# Patient Record
Sex: MALE | Race: AMERICAN INDIAN OR ALASKA NATIVE | NOT HISPANIC OR LATINO | URBAN - METROPOLITAN AREA
[De-identification: names, ages, dates, MRNs, and addresses within clinical notes are randomized per-mention and may not be internally consistent; named-entity substitution may affect disease eponyms.]

---

## 2017-07-27 ENCOUNTER — INPATIENT (INPATIENT)
Facility: HOSPITAL | Age: 67
LOS: 0 days | Discharge: ANOTHER HOSPITAL | End: 2017-07-27
Attending: INTERNAL MEDICINE | Admitting: INTERNAL MEDICINE
Payer: SELF-PAY

## 2017-07-27 VITALS
DIASTOLIC BLOOD PRESSURE: 73 MMHG | OXYGEN SATURATION: 98 % | SYSTOLIC BLOOD PRESSURE: 126 MMHG | HEART RATE: 95 BPM | TEMPERATURE: 98 F | RESPIRATION RATE: 24 BRPM

## 2017-07-27 VITALS
HEART RATE: 89 BPM | SYSTOLIC BLOOD PRESSURE: 119 MMHG | RESPIRATION RATE: 26 BRPM | DIASTOLIC BLOOD PRESSURE: 68 MMHG | OXYGEN SATURATION: 99 %

## 2017-07-27 DIAGNOSIS — R07.9 CHEST PAIN, UNSPECIFIED: ICD-10-CM

## 2017-07-27 LAB
ALBUMIN SERPL ELPH-MCNC: 2.8 G/DL — LOW (ref 3.3–5)
ALP SERPL-CCNC: 73 U/L — SIGNIFICANT CHANGE UP (ref 40–120)
ALT FLD-CCNC: 20 U/L — SIGNIFICANT CHANGE UP (ref 4–41)
AST SERPL-CCNC: 20 U/L — SIGNIFICANT CHANGE UP (ref 4–40)
BILIRUB SERPL-MCNC: 0.8 MG/DL — SIGNIFICANT CHANGE UP (ref 0.2–1.2)
BUN SERPL-MCNC: 13 MG/DL — SIGNIFICANT CHANGE UP (ref 7–23)
CALCIUM SERPL-MCNC: 7.8 MG/DL — LOW (ref 8.4–10.5)
CHLORIDE SERPL-SCNC: 91 MMOL/L — LOW (ref 98–107)
CK MB BLD-MCNC: 1.31 NG/ML — SIGNIFICANT CHANGE UP (ref 1–6.6)
CK MB BLD-MCNC: SIGNIFICANT CHANGE UP (ref 0–2.5)
CK SERPL-CCNC: 99 U/L — SIGNIFICANT CHANGE UP (ref 30–200)
CO2 SERPL-SCNC: 31 MMOL/L — SIGNIFICANT CHANGE UP (ref 22–31)
CREAT SERPL-MCNC: 0.84 MG/DL — SIGNIFICANT CHANGE UP (ref 0.5–1.3)
GLUCOSE SERPL-MCNC: 247 MG/DL — HIGH (ref 70–99)
HBA1C BLD-MCNC: 10.2 % — HIGH (ref 4–5.6)
HCT VFR BLD CALC: 31.8 % — LOW (ref 39–50)
HGB BLD-MCNC: 9.2 G/DL — LOW (ref 13–17)
MAGNESIUM SERPL-MCNC: 1.8 MG/DL — SIGNIFICANT CHANGE UP (ref 1.6–2.6)
MCHC RBC-ENTMCNC: 23 PG — LOW (ref 27–34)
MCHC RBC-ENTMCNC: 28.9 % — LOW (ref 32–36)
MCV RBC AUTO: 79.5 FL — LOW (ref 80–100)
NRBC # FLD: 0 — SIGNIFICANT CHANGE UP
NT-PROBNP SERPL-SCNC: 14.13 PG/ML — SIGNIFICANT CHANGE UP
PHOSPHATE SERPL-MCNC: 3.9 MG/DL — SIGNIFICANT CHANGE UP (ref 2.5–4.5)
PLATELET # BLD AUTO: 246 K/UL — SIGNIFICANT CHANGE UP (ref 150–400)
PMV BLD: 8.8 FL — SIGNIFICANT CHANGE UP (ref 7–13)
POTASSIUM SERPL-MCNC: 3 MMOL/L — LOW (ref 3.5–5.3)
POTASSIUM SERPL-SCNC: 3 MMOL/L — LOW (ref 3.5–5.3)
PROT SERPL-MCNC: 6.4 G/DL — SIGNIFICANT CHANGE UP (ref 6–8.3)
RBC # BLD: 4 M/UL — LOW (ref 4.2–5.8)
RBC # FLD: 18.3 % — HIGH (ref 10.3–14.5)
SODIUM SERPL-SCNC: 133 MMOL/L — LOW (ref 135–145)
TROPONIN T SERPL-MCNC: < 0.06 NG/ML — SIGNIFICANT CHANGE UP (ref 0–0.06)
TSH SERPL-MCNC: 0.01 UIU/ML — LOW (ref 0.27–4.2)
WBC # BLD: 7.38 K/UL — SIGNIFICANT CHANGE UP (ref 3.8–10.5)
WBC # FLD AUTO: 7.38 K/UL — SIGNIFICANT CHANGE UP (ref 3.8–10.5)

## 2017-07-27 PROCEDURE — 93010 ELECTROCARDIOGRAM REPORT: CPT

## 2017-07-27 RX ORDER — POTASSIUM CHLORIDE 20 MEQ
40 PACKET (EA) ORAL EVERY 4 HOURS
Qty: 0 | Refills: 0 | Status: DISCONTINUED | OUTPATIENT
Start: 2017-07-27 | End: 2017-07-27

## 2017-07-27 RX ORDER — SODIUM CHLORIDE 9 MG/ML
3 INJECTION INTRAMUSCULAR; INTRAVENOUS; SUBCUTANEOUS EVERY 8 HOURS
Qty: 0 | Refills: 0 | Status: DISCONTINUED | OUTPATIENT
Start: 2017-07-27 | End: 2017-07-27

## 2017-07-27 RX ORDER — SODIUM CHLORIDE 9 MG/ML
3 INJECTION INTRAMUSCULAR; INTRAVENOUS; SUBCUTANEOUS
Qty: 0 | Refills: 0 | COMMUNITY
Start: 2017-07-27

## 2017-07-27 RX ORDER — ASPIRIN/CALCIUM CARB/MAGNESIUM 324 MG
1 TABLET ORAL
Qty: 0 | Refills: 0 | COMMUNITY

## 2017-07-27 RX ORDER — POTASSIUM CHLORIDE 20 MEQ
2 PACKET (EA) ORAL
Qty: 0 | Refills: 0 | COMMUNITY
Start: 2017-07-27

## 2017-07-27 RX ORDER — ATORVASTATIN CALCIUM 80 MG/1
1 TABLET, FILM COATED ORAL
Qty: 0 | Refills: 0 | COMMUNITY

## 2017-07-27 RX ADMIN — SODIUM CHLORIDE 3 MILLILITER(S): 9 INJECTION INTRAMUSCULAR; INTRAVENOUS; SUBCUTANEOUS at 21:49

## 2017-07-27 RX ADMIN — Medication 40 MILLIEQUIVALENT(S): at 21:47

## 2017-07-27 NOTE — CHART NOTE - NSCHARTNOTEFT_GEN_A_CORE
Discussed w/ Dr. Muro regarding patient transfer to [address] [place] [address]. As per Dr. Muro, ok to transfer to [facility].

## 2017-07-27 NOTE — DISCHARGE NOTE ADULT - HOSPITAL COURSE
66 y/o M w/ PMH of DM, HTN, CAD, Anemia, MI (?2007) and CHF being transferred from [facility] for cardiac catheretization. Pt was admitted to [place] for an acute CHF exacerbation and was diuresed with IV Lasix BID. During hospitalization in [facility], he had a stress test on 7/24/17 which showed a severe fixed defect involving the mid to apical gayatri-septal wall and EF of 34%. His admission was complicated by Vfib cardiac arrest and ROSC was achieved after 12 minutes after 3 doses of epinephrine and defibrillation. Patient was then transferred to CCU at that time after ROSC and intubation. Pt was extubated in CCU and echo showed EF 35-40%, severe hypokinesis of anterior/septal/apical walls with severe diastolic dysfunction. Patient was transfused 1 unit PRBC for anemia and was started on iron supplementation. Pt transferred to [facility] cath lab for angiogram 7/27/17. Patient now transferred to [facility] CCU for close monitoring. s/p LHC showing severe multi vessel CAD; pLAd 95% stenosis, mLAD 99% stenosis, OM1 99% stenosis, % stenosis. EF 35%. Patient transferred to [facility] for CTS service for CABG evaluation.

## 2017-07-27 NOTE — DISCHARGE NOTE ADULT - CARE PROVIDER_API CALL
Maurice Muro), Cardiovascular Disease; Internal Medicine  [address] [place] [address]  Phone: [phone]  Fax: [phone]

## 2017-07-27 NOTE — DISCHARGE NOTE ADULT - CARE PLAN
Principal Discharge DX:	Non-ST elevation (NSTEMI) myocardial infarction  Secondary Diagnosis:	Acute systolic congestive heart failure  Secondary Diagnosis:	Type 2 diabetes mellitus without complication, with long-term current use of insulin  Goal:	Maintain adequate glycemic control. Monitor blood sugars. Goal HgA1C < 7.%. Maintains compliance with medication and diet  Secondary Diagnosis:	Other hyperlipidemia  Goal:	Maintain adequate control of your cholesterol levels. Goal LDL < 70. Maintains compliance with medication and diet  Secondary Diagnosis:	Essential hypertension  Goal:	Maintain adequate control of your blood pressure. Goal BP < 130/80. Maintains compliance with medication and diet.  Secondary Diagnosis:	Coronary artery disease involving native coronary artery of native heart without angina pectoris  Secondary Diagnosis:	Anemia, unspecified type Principal Discharge DX:	Non-ST elevation (NSTEMI) myocardial infarction  Goal:	To revascularize occluded coronary artery and maintain patency  Instructions for follow-up, activity and diet:	You got admitted to hospital for angiogram and was showed to have blockages in coronary artery. You are being transferred to [facility] for possible surgery to correct blockages in heart.  Secondary Diagnosis:	Acute systolic congestive heart failure  Goal:	Maintain medication compliance, prevent hospital readmission  Instructions for follow-up, activity and diet:	You are found to have decrease in the contractility function of heart. You may need to take medications to prevent symptoms and to maintain fluid balance of body.  Secondary Diagnosis:	Type 2 diabetes mellitus without complication, with long-term current use of insulin  Goal:	Maintain adequate glycemic control. Monitor blood sugars. Goal HgA1C < 7.%. Maintains compliance with medication and diet  Instructions for follow-up, activity and diet:	Diabetes mellitus type 2 is a disease that affects how your body uses glucose (sugar). Normally, when the blood sugar level increases, the pancreas makes more insulin. Insulin helps move sugar out of the blood so it can be used for energy. Type 2 diabetes develops because either the body cannot make enough insulin, or it cannot use the insulin correctly. Your HbA1C was elevated when you came to the hospital, which shows that your diabetes has not been controlled at home. Uncontrolled diabetes can cause eye disease, heart attack, and stroke. You should follow up with PCP and/or endocrinologist for ongoing medical management of your diabetes. Please check your blood sugars as ordered and continue your medications and insulin as prescribed. Low carb diet. If you had an angiogram with contrast, do not start oral medications until 72 hours post procedure.  Secondary Diagnosis:	Other hyperlipidemia  Goal:	Maintain adequate control of your cholesterol levels. Goal LDL < 70. Maintains compliance with medication and diet  Secondary Diagnosis:	Essential hypertension  Goal:	Maintain adequate control of your blood pressure. Goal BP < 130/80. Maintains compliance with medication and diet.  Instructions for follow-up, activity and diet:	hypertension is a long-term condition in which your blood pressure (BP) is higher than normal. Your BP is the force of your blood moving against the walls of your arteries. Normal blood pressure is less than 120/80. Prehypertension is BP between 120/80 and 139/89. High blood pressure is 140/90 or higher.Uncontrolled hypertension can lead to retinal disease, heart attack, and stroke. Follow up with PCP and/or cardiologist for ongoing medical management of your hypertension.Please continue to monitor your blood pressure at home and continue medications as prescribed. DASH diet.  Secondary Diagnosis:	Coronary artery disease involving native coronary artery of native heart without angina pectoris  Goal:	Achieve revascularization of occluded coronary artery, medication compliance  Instructions for follow-up, activity and diet:	You got admitted to hospital for angiogram and was showed to have blockages in coronary artery. You are being transferred to [facility] for possible surgery to correct blockages in heart.  Secondary Diagnosis:	Anemia, unspecified type  Goal:	Maintain H/H level with in normal limits.  Instructions for follow-up, activity and diet:	You have history of anemia and is on iron tablets to maintain blood levels. Principal Discharge DX:	Non-ST elevation (NSTEMI) myocardial infarction  Goal:	To revascularize occluded coronary artery and maintain patency  Instructions for follow-up, activity and diet:	You got admitted to hospital for angiogram and was showed to have blockages in coronary artery. You are being transferred to [facility] for possible surgery to correct blockages in heart.  Secondary Diagnosis:	Acute systolic congestive heart failure  Goal:	Maintain medication compliance, prevent hospital readmission  Instructions for follow-up, activity and diet:	You are found to have decrease in the contractility function of heart. You may need to take medications to prevent symptoms and to maintain fluid balance of body.  Secondary Diagnosis:	Type 2 diabetes mellitus without complication, with long-term current use of insulin  Goal:	Maintain adequate glycemic control. Monitor blood sugars. Goal HgA1C < 7.%. Maintains compliance with medication and diet  Instructions for follow-up, activity and diet:	Diabetes mellitus type 2 is a disease that affects how your body uses glucose (sugar). Normally, when the blood sugar level increases, the pancreas makes more insulin. Insulin helps move sugar out of the blood so it can be used for energy. Type 2 diabetes develops because either the body cannot make enough insulin, or it cannot use the insulin correctly. Your HbA1C was elevated when you came to the hospital, which shows that your diabetes has not been controlled at home. Uncontrolled diabetes can cause eye disease, heart attack, and stroke. You should follow up with PCP and/or endocrinologist for ongoing medical management of your diabetes. Please check your blood sugars as ordered and continue your medications and insulin as prescribed. Low carb diet. If you had an angiogram with contrast, do not start oral medications until 72 hours post procedure.  Secondary Diagnosis:	Other hyperlipidemia  Goal:	Maintain adequate control of your cholesterol levels. Goal LDL < 70. Maintains compliance with medication and diet  Secondary Diagnosis:	Essential hypertension  Goal:	Maintain adequate control of your blood pressure. Goal BP < 130/80. Maintains compliance with medication and diet.  Instructions for follow-up, activity and diet:	hypertension is a long-term condition in which your blood pressure (BP) is higher than normal. Your BP is the force of your blood moving against the walls of your arteries. Normal blood pressure is less than 120/80. Prehypertension is BP between 120/80 and 139/89. High blood pressure is 140/90 or higher.Uncontrolled hypertension can lead to retinal disease, heart attack, and stroke. Follow up with PCP and/or cardiologist for ongoing medical management of your hypertension.Please continue to monitor your blood pressure at home and continue medications as prescribed. DASH diet.  Secondary Diagnosis:	Coronary artery disease involving native coronary artery of native heart without angina pectoris  Goal:	Achieve revascularization of occluded coronary artery, medication compliance  Instructions for follow-up, activity and diet:	You got admitted to hospital for angiogram and was showed to have blockages in coronary artery. You are being transferred to [facility] for possible surgery to correct blockages in heart.  Secondary Diagnosis:	Anemia, unspecified type  Goal:	Maintain H/H level with in normal limits.  Instructions for follow-up, activity and diet:	You have history of anemia and is on iron tablets to maintain blood levels. Principal Discharge DX:	Non-ST elevation (NSTEMI) myocardial infarction  Goal:	To revascularize occluded coronary artery and maintain patency  Instructions for follow-up, activity and diet:	You got admitted to hospital for angiogram and was showed to have blockages in coronary artery. You are being transferred to [facility] for possible surgery to correct blockages in heart.  Secondary Diagnosis:	Acute systolic congestive heart failure  Goal:	Maintain medication compliance, prevent hospital readmission  Instructions for follow-up, activity and diet:	You are found to have decrease in the contractility function of heart. You may need to take medications to prevent symptoms and to maintain fluid balance of body.  Secondary Diagnosis:	Type 2 diabetes mellitus without complication, with long-term current use of insulin  Goal:	Maintain adequate glycemic control. Monitor blood sugars. Goal HgA1C < 7.%. Maintains compliance with medication and diet  Instructions for follow-up, activity and diet:	Diabetes mellitus type 2 is a disease that affects how your body uses glucose (sugar). Normally, when the blood sugar level increases, the pancreas makes more insulin. Insulin helps move sugar out of the blood so it can be used for energy. Type 2 diabetes develops because either the body cannot make enough insulin, or it cannot use the insulin correctly. Your HbA1C was elevated when you came to the hospital, which shows that your diabetes has not been controlled at home. Uncontrolled diabetes can cause eye disease, heart attack, and stroke. You should follow up with PCP and/or endocrinologist for ongoing medical management of your diabetes. Please check your blood sugars as ordered and continue your medications and insulin as prescribed. Low carb diet. If you had an angiogram with contrast, do not start oral medications until 72 hours post procedure.  Secondary Diagnosis:	Other hyperlipidemia  Goal:	Maintain adequate control of your cholesterol levels. Goal LDL < 70. Maintains compliance with medication and diet  Secondary Diagnosis:	Essential hypertension  Goal:	Maintain adequate control of your blood pressure. Goal BP < 130/80. Maintains compliance with medication and diet.  Instructions for follow-up, activity and diet:	hypertension is a long-term condition in which your blood pressure (BP) is higher than normal. Your BP is the force of your blood moving against the walls of your arteries. Normal blood pressure is less than 120/80. Prehypertension is BP between 120/80 and 139/89. High blood pressure is 140/90 or higher.Uncontrolled hypertension can lead to retinal disease, heart attack, and stroke. Follow up with PCP and/or cardiologist for ongoing medical management of your hypertension.Please continue to monitor your blood pressure at home and continue medications as prescribed. DASH diet.  Secondary Diagnosis:	Coronary artery disease involving native coronary artery of native heart without angina pectoris  Goal:	Achieve revascularization of occluded coronary artery, medication compliance  Instructions for follow-up, activity and diet:	You got admitted to hospital for angiogram and was showed to have blockages in coronary artery. You are being transferred to [facility] for possible surgery to correct blockages in heart.  Secondary Diagnosis:	Anemia, unspecified type  Goal:	Maintain H/H level with in normal limits.  Instructions for follow-up, activity and diet:	You have history of anemia and is on iron tablets to maintain blood levels.

## 2017-07-27 NOTE — H&P CARDIOLOGY - RS GEN PE MLT RESP DETAILS PC
good air movement/respirations non-labored/no chest wall tenderness/airway patent/breath sounds equal/clear to auscultation bilaterally

## 2017-07-27 NOTE — PATIENT PROFILE ADULT. - LANGUAGE ASSISTANCE NEEDED
patient's daughter at bedside/No-Patient/Caregiver offered and refused free interpretation services.

## 2017-07-27 NOTE — H&P CARDIOLOGY - PMH
Anemia    CAD (coronary artery disease)    CHF (congestive heart failure)    DM (diabetes mellitus)    HLD (hyperlipidemia)    HTN (hypertension)    MI (myocardial infarction)

## 2017-07-27 NOTE — H&P CARDIOLOGY - HISTORY OF PRESENT ILLNESS
68 y/o M w/ PMH of DM, HTN, CAD, Anemia, MI (?2007) and CHF being transferred from [facility] for cardiac catheretization. Pt was admitted to [place] for an acute CHF exacerbation and was diuresed with IV Lasix BID. Pt had a stress test on 724/17 which showed a severe fixed defect involving the mid to apical gayatri-septal wall and EF of 34%. Pt admission was complicated by Vfib cardiac arrest and ROSC was achieved after 12 minutes after 3 doses of epinephrine and defibrillation. Pt was transferred to CCU at that time after ROSC and intubation. Pt was extubated in CCU and echo showed EF 35-40%, severe hypokinesis of anterior/septal/apical walls with severe diastolic dysfunction. During admission patient was transfused 1 unit PRBC for anemia and was started on iron supplementation. Pt transferred to [facility] cath lab for angiogram with plan for further CCU care post cath. 68 y/o M w/ PMH of DM, HTN, CAD, Anemia, MI (?2007) and CHF being transferred from [facility] for cardiac catheretization. Pt was admitted to [place] for an acute CHF exacerbation and was diuresed with IV Lasix BID. Pt had a stress test on 7/24/17 which showed a severe fixed defect involving the mid to apical gayatri-septal wall and EF of 34%. Pt admission was complicated by Vfib cardiac arrest and ROSC was achieved after 12 minutes after 3 doses of epinephrine and defibrillation. Pt was transferred to CCU at that time after ROSC and intubation. Pt was extubated in CCU and echo showed EF 35-40%, severe hypokinesis of anterior/septal/apical walls with severe diastolic dysfunction. During admission patient was transfused 1 unit PRBC for anemia and was started on iron supplementation. Pt transferred to [facility] cath lab for angiogram with plan for further CCU care post cath.

## 2017-07-27 NOTE — PROGRESS NOTE ADULT - SUBJECTIVE AND OBJECTIVE BOX
Right 5 Fr arterial sheath discontinued. Maunal pressure applied for 18 mins and good hemostatis obtained.  No hematoma or bleeding noted.Vital sign stable .Patient tolerated procedure well. RN instructed to monitor groin for bleeding or hematoma  .

## 2017-07-27 NOTE — DISCHARGE NOTE ADULT - MEDICATION SUMMARY - MEDICATIONS TO TAKE
I will START or STAY ON the medications listed below when I get home from the hospital:    sodium chloride 0.9% lock flush  -- 3 milliliter(s) intravenous every 8 hours  -- Indication: For KVO    aspirin 81 mg oral tablet  -- 1 tab(s) by mouth once a day  -- Indication: For CAD (coronary artery disease)    valsartan 40 mg oral tablet  -- 1 tab(s) by mouth once a day  -- Indication: For CHF (congestive heart failure)    Lantus 100 units/mL subcutaneous solution  -- 17 unit(s) subcutaneous once a day (at bedtime)  -- Indication: For DM (diabetes mellitus)    Lipitor 80 mg oral tablet  -- 1 tab(s) by mouth once a day  -- Indication: For HLD (hyperlipidemia)    Plavix 75 mg oral tablet  -- 1 tab(s) by mouth once a day  -- Indication: For MI (myocardial infarction)    metoprolol tartrate 25 mg oral tablet  -- 1 tab(s) by mouth 2 times a day  -- Indication: For CAD (coronary artery disease)    Lasix 40 mg oral tablet  -- 1 tab(s) by mouth once a day  -- Indication: For CHF (congestive heart failure)    ferrous sulfate 325 mg (65 mg elemental iron) oral delayed release tablet  -- 1 tab(s) by mouth 2 times a day  -- Indication: For Anemia    potassium chloride 20 mEq oral tablet, extended release  -- 2 tab(s) by mouth every 4 hours  -- Indication: For CHF (congestive heart failure)

## 2017-07-27 NOTE — DISCHARGE NOTE ADULT - FINDINGS/TREATMENT
< from: Cardiac Cath Lab - Adult (07.27.17 @ 18:19) > CORONARY VESSELS: The coronary circulation is left dominant. LM:   --  LM: Angiography showed mild atherosclerosis with no flow limiting lesions. LAD:   --  Proximal LAD: There was a 95 % stenosis. Mid LAD: There was a 99 % stenosis. D1: Angiography showed severe atherosclerosis. CX:   --  Circumflex: Angiography showed mild atherosclerosis with no flow limiting lesions. OM1: The vessel was large sized. There was a 99 % stenosis. RCA:   --  Proximal RCA: There was a 100 % stenosis. COMPLICATIONS: There were no complications. DIAGNOSTIC RECOMMENDATIONS: Severe multi-vessel CAD in DM patient. s/p V. Fib Arrest (Defib x 1) Systolic CHF EF 35% CTS for CABG. INTERVENTIONAL RECOMMENDATIONS: Severe multi-vessel CAD in DM patient. s/p V. Fib Arrest (Defib x 1) Systolic CHF EF 35% CTS for CABG.    < end of copied text >

## 2017-07-27 NOTE — DISCHARGE NOTE ADULT - SECONDARY DIAGNOSIS.
Acute systolic congestive heart failure Type 2 diabetes mellitus without complication, with long-term current use of insulin Other hyperlipidemia Essential hypertension Coronary artery disease involving native coronary artery of native heart without angina pectoris Anemia, unspecified type

## 2017-07-27 NOTE — H&P CARDIOLOGY - NEGATIVE NEUROLOGICAL SYMPTOMS
no facial palsy/no tremors/no hemiparesis/no paresthesias/no confusion/no difficulty walking/no focal seizures/no headache/no vertigo/no loss of consciousness/no syncope/no generalized seizures/no transient paralysis/no weakness/no loss of sensation

## 2017-07-27 NOTE — DISCHARGE NOTE ADULT - PLAN OF CARE
Maintain adequate glycemic control. Monitor blood sugars. Goal HgA1C < 7.%. Maintains compliance with medication and diet Maintain adequate control of your cholesterol levels. Goal LDL < 70. Maintains compliance with medication and diet Maintain adequate control of your blood pressure. Goal BP < 130/80. Maintains compliance with medication and diet. To revascularize occluded coronary artery and maintain patency Maintain medication compliance, prevent hospital readmission Achieve revascularization of occluded coronary artery, medication compliance Maintain H/H level with in normal limits. You got admitted to hospital for angiogram and was showed to have blockages in coronary artery. You are being transferred to [facility] for possible surgery to correct blockages in heart. You are found to have decrease in the contractility function of heart. You may need to take medications to prevent symptoms and to maintain fluid balance of body. Diabetes mellitus type 2 is a disease that affects how your body uses glucose (sugar). Normally, when the blood sugar level increases, the pancreas makes more insulin. Insulin helps move sugar out of the blood so it can be used for energy. Type 2 diabetes develops because either the body cannot make enough insulin, or it cannot use the insulin correctly. Your HbA1C was elevated when you came to the hospital, which shows that your diabetes has not been controlled at home. Uncontrolled diabetes can cause eye disease, heart attack, and stroke. You should follow up with PCP and/or endocrinologist for ongoing medical management of your diabetes. Please check your blood sugars as ordered and continue your medications and insulin as prescribed. Low carb diet. If you had an angiogram with contrast, do not start oral medications until 72 hours post procedure. hypertension is a long-term condition in which your blood pressure (BP) is higher than normal. Your BP is the force of your blood moving against the walls of your arteries. Normal blood pressure is less than 120/80. Prehypertension is BP between 120/80 and 139/89. High blood pressure is 140/90 or higher.Uncontrolled hypertension can lead to retinal disease, heart attack, and stroke. Follow up with PCP and/or cardiologist for ongoing medical management of your hypertension.Please continue to monitor your blood pressure at home and continue medications as prescribed. DASH diet. You got admitted to hospital for angiogram and was showed to have blockages in coronary artery. You are being transferred to [facility] for possible surgery to correct blockages in heart. You have history of anemia and is on iron tablets to maintain blood levels.

## 2017-07-28 ENCOUNTER — INPATIENT (INPATIENT)
Facility: HOSPITAL | Age: 67
LOS: 13 days | Discharge: NURSING, REHAB OR LONG-TERM CARE | DRG: 235 | End: 2017-08-11
Attending: THORACIC SURGERY (CARDIOTHORACIC VASCULAR SURGERY) | Admitting: THORACIC SURGERY (CARDIOTHORACIC VASCULAR SURGERY)
Payer: COMMERCIAL

## 2017-07-28 VITALS
TEMPERATURE: 97 F | WEIGHT: 198.64 LBS | HEIGHT: 66 IN | OXYGEN SATURATION: 94 % | RESPIRATION RATE: 18 BRPM | SYSTOLIC BLOOD PRESSURE: 131 MMHG | HEART RATE: 94 BPM | DIASTOLIC BLOOD PRESSURE: 81 MMHG

## 2017-07-28 DIAGNOSIS — I50.22 CHRONIC SYSTOLIC (CONGESTIVE) HEART FAILURE: ICD-10-CM

## 2017-07-28 DIAGNOSIS — Z29.9 ENCOUNTER FOR PROPHYLACTIC MEASURES, UNSPECIFIED: ICD-10-CM

## 2017-07-28 DIAGNOSIS — I10 ESSENTIAL (PRIMARY) HYPERTENSION: ICD-10-CM

## 2017-07-28 DIAGNOSIS — I25.110 ATHEROSCLEROTIC HEART DISEASE OF NATIVE CORONARY ARTERY WITH UNSTABLE ANGINA PECTORIS: ICD-10-CM

## 2017-07-28 DIAGNOSIS — E78.5 HYPERLIPIDEMIA, UNSPECIFIED: ICD-10-CM

## 2017-07-28 DIAGNOSIS — D64.9 ANEMIA, UNSPECIFIED: ICD-10-CM

## 2017-07-28 DIAGNOSIS — E78.2 MIXED HYPERLIPIDEMIA: ICD-10-CM

## 2017-07-28 DIAGNOSIS — E11.65 TYPE 2 DIABETES MELLITUS WITH HYPERGLYCEMIA: ICD-10-CM

## 2017-07-28 DIAGNOSIS — I50.21 ACUTE SYSTOLIC (CONGESTIVE) HEART FAILURE: ICD-10-CM

## 2017-07-28 DIAGNOSIS — I25.10 ATHEROSCLEROTIC HEART DISEASE OF NATIVE CORONARY ARTERY WITHOUT ANGINA PECTORIS: ICD-10-CM

## 2017-07-28 DIAGNOSIS — I25.118 ATHEROSCLEROTIC HEART DISEASE OF NATIVE CORONARY ARTERY WITH OTHER FORMS OF ANGINA PECTORIS: ICD-10-CM

## 2017-07-28 PROBLEM — Z00.00 ENCOUNTER FOR PREVENTIVE HEALTH EXAMINATION: Status: ACTIVE | Noted: 2017-07-28

## 2017-07-28 LAB
ANION GAP SERPL CALC-SCNC: 9 MMOL/L — SIGNIFICANT CHANGE UP (ref 5–17)
APPEARANCE UR: CLEAR — SIGNIFICANT CHANGE UP
APTT BLD: 22.5 SEC — LOW (ref 27.5–37.4)
APTT BLD: 40.2 SEC — HIGH (ref 27.5–37.4)
BILIRUB UR-MCNC: NEGATIVE — SIGNIFICANT CHANGE UP
BLD GP AB SCN SERPL QL: NEGATIVE — SIGNIFICANT CHANGE UP
BUN SERPL-MCNC: 13 MG/DL — SIGNIFICANT CHANGE UP (ref 7–23)
CALCIUM SERPL-MCNC: 8.1 MG/DL — LOW (ref 8.4–10.5)
CHLORIDE SERPL-SCNC: 93 MMOL/L — LOW (ref 96–108)
CO2 SERPL-SCNC: 31 MMOL/L — SIGNIFICANT CHANGE UP (ref 22–31)
COLOR SPEC: YELLOW — SIGNIFICANT CHANGE UP
CREAT SERPL-MCNC: 0.92 MG/DL — SIGNIFICANT CHANGE UP (ref 0.5–1.3)
DIFF PNL FLD: NEGATIVE — SIGNIFICANT CHANGE UP
GLUCOSE SERPL-MCNC: 277 MG/DL — HIGH (ref 70–99)
GLUCOSE UR QL: 1000
HCT VFR BLD CALC: 33.7 % — LOW (ref 39–50)
HGB BLD-MCNC: 10.1 G/DL — LOW (ref 13–17)
INR BLD: 1.08 RATIO — SIGNIFICANT CHANGE UP (ref 0.88–1.16)
KETONES UR-MCNC: NEGATIVE — SIGNIFICANT CHANGE UP
LEUKOCYTE ESTERASE UR-ACNC: NEGATIVE — SIGNIFICANT CHANGE UP
MCHC RBC-ENTMCNC: 24.9 PG — LOW (ref 27–34)
MCHC RBC-ENTMCNC: 30.1 GM/DL — LOW (ref 32–36)
MCV RBC AUTO: 82.8 FL — SIGNIFICANT CHANGE UP (ref 80–100)
MRSA PCR RESULT.: SIGNIFICANT CHANGE UP
NITRITE UR-MCNC: NEGATIVE — SIGNIFICANT CHANGE UP
NT-PROBNP SERPL-SCNC: 2447 PG/ML — HIGH (ref 0–300)
PA ADP PRP-ACNC: 272 PRU — SIGNIFICANT CHANGE UP (ref 194–417)
PH UR: 7.5 — SIGNIFICANT CHANGE UP (ref 5–8)
PLATELET # BLD AUTO: 263 K/UL — SIGNIFICANT CHANGE UP (ref 150–400)
POTASSIUM SERPL-MCNC: 4 MMOL/L — SIGNIFICANT CHANGE UP (ref 3.5–5.3)
POTASSIUM SERPL-SCNC: 4 MMOL/L — SIGNIFICANT CHANGE UP (ref 3.5–5.3)
PROT UR-MCNC: SIGNIFICANT CHANGE UP
PROTHROM AB SERPL-ACNC: 11.7 SEC — SIGNIFICANT CHANGE UP (ref 9.8–12.7)
RBC # BLD: 4.07 M/UL — LOW (ref 4.2–5.8)
RBC # FLD: 18.6 % — HIGH (ref 10.3–14.5)
RH IG SCN BLD-IMP: POSITIVE — SIGNIFICANT CHANGE UP
S AUREUS DNA NOSE QL NAA+PROBE: DETECTED
SODIUM SERPL-SCNC: 133 MMOL/L — LOW (ref 135–145)
SP GR SPEC: 1.02 — SIGNIFICANT CHANGE UP (ref 1.01–1.02)
TSH SERPL-MCNC: 4.46 UIU/ML — HIGH (ref 0.27–4.2)
UROBILINOGEN FLD QL: 8
WBC # BLD: 7.1 K/UL — SIGNIFICANT CHANGE UP (ref 3.8–10.5)
WBC # FLD AUTO: 7.1 K/UL — SIGNIFICANT CHANGE UP (ref 3.8–10.5)

## 2017-07-28 PROCEDURE — 94010 BREATHING CAPACITY TEST: CPT | Mod: 26

## 2017-07-28 PROCEDURE — 71010: CPT | Mod: 26

## 2017-07-28 PROCEDURE — 93306 TTE W/DOPPLER COMPLETE: CPT | Mod: 26

## 2017-07-28 PROCEDURE — 93010 ELECTROCARDIOGRAM REPORT: CPT

## 2017-07-28 PROCEDURE — 93880 EXTRACRANIAL BILAT STUDY: CPT | Mod: 26

## 2017-07-28 PROCEDURE — 99231 SBSQ HOSP IP/OBS SF/LOW 25: CPT

## 2017-07-28 RX ORDER — INSULIN HUMAN 100 [IU]/ML
6 INJECTION, SOLUTION SUBCUTANEOUS
Qty: 100 | Refills: 0 | Status: DISCONTINUED | OUTPATIENT
Start: 2017-07-28 | End: 2017-07-31

## 2017-07-28 RX ORDER — GLUCAGON INJECTION, SOLUTION 0.5 MG/.1ML
1 INJECTION, SOLUTION SUBCUTANEOUS ONCE
Qty: 0 | Refills: 0 | Status: DISCONTINUED | OUTPATIENT
Start: 2017-07-28 | End: 2017-07-29

## 2017-07-28 RX ORDER — INSULIN LISPRO 100/ML
VIAL (ML) SUBCUTANEOUS AT BEDTIME
Qty: 0 | Refills: 0 | Status: DISCONTINUED | OUTPATIENT
Start: 2017-07-28 | End: 2017-07-31

## 2017-07-28 RX ORDER — DEXTROSE 50 % IN WATER 50 %
25 SYRINGE (ML) INTRAVENOUS ONCE
Qty: 0 | Refills: 0 | Status: DISCONTINUED | OUTPATIENT
Start: 2017-07-28 | End: 2017-07-29

## 2017-07-28 RX ORDER — SODIUM CHLORIDE 9 MG/ML
1000 INJECTION, SOLUTION INTRAVENOUS
Qty: 0 | Refills: 0 | Status: DISCONTINUED | OUTPATIENT
Start: 2017-07-28 | End: 2017-07-29

## 2017-07-28 RX ORDER — METOPROLOL TARTRATE 50 MG
25 TABLET ORAL THREE TIMES A DAY
Qty: 0 | Refills: 0 | Status: DISCONTINUED | OUTPATIENT
Start: 2017-07-28 | End: 2017-07-31

## 2017-07-28 RX ORDER — ENOXAPARIN SODIUM 100 MG/ML
40 INJECTION SUBCUTANEOUS DAILY
Qty: 0 | Refills: 0 | Status: DISCONTINUED | OUTPATIENT
Start: 2017-07-28 | End: 2017-07-28

## 2017-07-28 RX ORDER — POTASSIUM CHLORIDE 20 MEQ
20 PACKET (EA) ORAL DAILY
Qty: 0 | Refills: 0 | Status: DISCONTINUED | OUTPATIENT
Start: 2017-07-28 | End: 2017-07-31

## 2017-07-28 RX ORDER — INSULIN GLARGINE 100 [IU]/ML
12 INJECTION, SOLUTION SUBCUTANEOUS AT BEDTIME
Qty: 0 | Refills: 0 | Status: DISCONTINUED | OUTPATIENT
Start: 2017-07-28 | End: 2017-07-30

## 2017-07-28 RX ORDER — ASPIRIN/CALCIUM CARB/MAGNESIUM 324 MG
81 TABLET ORAL DAILY
Qty: 0 | Refills: 0 | Status: DISCONTINUED | OUTPATIENT
Start: 2017-07-28 | End: 2017-07-31

## 2017-07-28 RX ORDER — FUROSEMIDE 40 MG
40 TABLET ORAL DAILY
Qty: 0 | Refills: 0 | Status: DISCONTINUED | OUTPATIENT
Start: 2017-07-28 | End: 2017-07-31

## 2017-07-28 RX ORDER — DEXTROSE 50 % IN WATER 50 %
1 SYRINGE (ML) INTRAVENOUS ONCE
Qty: 0 | Refills: 0 | Status: DISCONTINUED | OUTPATIENT
Start: 2017-07-28 | End: 2017-07-31

## 2017-07-28 RX ORDER — INSULIN LISPRO 100/ML
VIAL (ML) SUBCUTANEOUS
Qty: 0 | Refills: 0 | Status: DISCONTINUED | OUTPATIENT
Start: 2017-07-28 | End: 2017-07-31

## 2017-07-28 RX ORDER — DEXTROSE 50 % IN WATER 50 %
12.5 SYRINGE (ML) INTRAVENOUS ONCE
Qty: 0 | Refills: 0 | Status: DISCONTINUED | OUTPATIENT
Start: 2017-07-28 | End: 2017-07-29

## 2017-07-28 RX ORDER — METOPROLOL TARTRATE 50 MG
25 TABLET ORAL
Qty: 0 | Refills: 0 | Status: DISCONTINUED | OUTPATIENT
Start: 2017-07-28 | End: 2017-07-28

## 2017-07-28 RX ORDER — ATORVASTATIN CALCIUM 80 MG/1
80 TABLET, FILM COATED ORAL AT BEDTIME
Qty: 0 | Refills: 0 | Status: DISCONTINUED | OUTPATIENT
Start: 2017-07-28 | End: 2017-07-31

## 2017-07-28 RX ORDER — ACETAMINOPHEN 500 MG
650 TABLET ORAL EVERY 6 HOURS
Qty: 0 | Refills: 0 | Status: DISCONTINUED | OUTPATIENT
Start: 2017-07-28 | End: 2017-07-31

## 2017-07-28 RX ORDER — DEXTROSE 50 % IN WATER 50 %
25 SYRINGE (ML) INTRAVENOUS ONCE
Qty: 0 | Refills: 0 | Status: DISCONTINUED | OUTPATIENT
Start: 2017-07-28 | End: 2017-07-31

## 2017-07-28 RX ORDER — MUPIROCIN 20 MG/G
1 OINTMENT TOPICAL
Qty: 0 | Refills: 0 | Status: DISCONTINUED | OUTPATIENT
Start: 2017-07-28 | End: 2017-07-31

## 2017-07-28 RX ORDER — INSULIN LISPRO 100/ML
6 VIAL (ML) SUBCUTANEOUS ONCE
Qty: 0 | Refills: 0 | Status: DISCONTINUED | OUTPATIENT
Start: 2017-07-28 | End: 2017-07-28

## 2017-07-28 RX ORDER — FERROUS SULFATE 325(65) MG
325 TABLET ORAL DAILY
Qty: 0 | Refills: 0 | Status: DISCONTINUED | OUTPATIENT
Start: 2017-07-28 | End: 2017-07-31

## 2017-07-28 RX ORDER — INSULIN LISPRO 100/ML
6 VIAL (ML) SUBCUTANEOUS
Qty: 0 | Refills: 0 | Status: DISCONTINUED | OUTPATIENT
Start: 2017-07-28 | End: 2017-07-29

## 2017-07-28 RX ORDER — INSULIN LISPRO 100/ML
6 VIAL (ML) SUBCUTANEOUS ONCE
Qty: 0 | Refills: 0 | Status: COMPLETED | OUTPATIENT
Start: 2017-07-28 | End: 2017-07-28

## 2017-07-28 RX ORDER — INSULIN GLARGINE 100 [IU]/ML
17 INJECTION, SOLUTION SUBCUTANEOUS AT BEDTIME
Qty: 0 | Refills: 0 | Status: DISCONTINUED | OUTPATIENT
Start: 2017-07-28 | End: 2017-07-28

## 2017-07-28 RX ORDER — HEPARIN SODIUM 5000 [USP'U]/ML
1000 INJECTION INTRAVENOUS; SUBCUTANEOUS
Qty: 25000 | Refills: 0 | Status: DISCONTINUED | OUTPATIENT
Start: 2017-07-28 | End: 2017-07-31

## 2017-07-28 RX ORDER — PANTOPRAZOLE SODIUM 20 MG/1
40 TABLET, DELAYED RELEASE ORAL
Qty: 0 | Refills: 0 | Status: DISCONTINUED | OUTPATIENT
Start: 2017-07-28 | End: 2017-07-31

## 2017-07-28 RX ADMIN — Medication 25 MILLIGRAM(S): at 06:39

## 2017-07-28 RX ADMIN — Medication 81 MILLIGRAM(S): at 12:01

## 2017-07-28 RX ADMIN — Medication 25 MILLIGRAM(S): at 22:05

## 2017-07-28 RX ADMIN — HEPARIN SODIUM 10 UNIT(S)/HR: 5000 INJECTION INTRAVENOUS; SUBCUTANEOUS at 17:06

## 2017-07-28 RX ADMIN — Medication 25 MILLIGRAM(S): at 13:55

## 2017-07-28 RX ADMIN — Medication 40 MILLIGRAM(S): at 06:39

## 2017-07-28 RX ADMIN — HEPARIN SODIUM 10 UNIT(S)/HR: 5000 INJECTION INTRAVENOUS; SUBCUTANEOUS at 09:10

## 2017-07-28 RX ADMIN — ATORVASTATIN CALCIUM 80 MILLIGRAM(S): 80 TABLET, FILM COATED ORAL at 22:05

## 2017-07-28 RX ADMIN — Medication 4: at 12:02

## 2017-07-28 RX ADMIN — PANTOPRAZOLE SODIUM 40 MILLIGRAM(S): 20 TABLET, DELAYED RELEASE ORAL at 06:39

## 2017-07-28 RX ADMIN — INSULIN GLARGINE 12 UNIT(S): 100 INJECTION, SOLUTION SUBCUTANEOUS at 22:05

## 2017-07-28 RX ADMIN — Medication: at 14:11

## 2017-07-28 RX ADMIN — Medication 6 UNIT(S): at 12:30

## 2017-07-28 RX ADMIN — Medication 20 MILLIEQUIVALENT(S): at 12:01

## 2017-07-28 RX ADMIN — Medication 325 MILLIGRAM(S): at 12:01

## 2017-07-28 NOTE — H&P ADULT - PROBLEM SELECTOR PROBLEM 1
Coronary artery disease involving native coronary artery of native heart with other form of angina pectoris

## 2017-07-28 NOTE — H&P ADULT - NSHPPHYSICALEXAM_GEN_ALL_CORE
Gen: NAD, lying comfortably in bed  Neuro: A&Ox3, no focal deficits  Card: + S1, S2. RRR.  Pulm: CTA b/l  Abd: soft, nontender, + BS. + BM.  Ext: trace LE edema, no calf tenderness, + DP/PT pulses. R femoral cath site with femostop s/p sheath removal @ 2130 @ [facility].

## 2017-07-28 NOTE — PROGRESS NOTE ADULT - SUBJECTIVE AND OBJECTIVE BOX
HPI:  66 y/o M w/ PMH of DM, HTN, CAD, Anemia, MI (?2007) and CHF being transferred from [facility] with triple vessel disease. Pt was admitted to [place] for an acute CHF exacerbation and was diuresed with IV Lasix BID. During hospitalization in [facility], he had a stress test on 7/24/17 which showed a severe fixed defect involving the mid to apical gayatri-septal wall and EF of 34%. His admission was complicated by Vfib cardiac arrest and ROSC was achieved after 12 minutes after 3 doses of epinephrine and defibrillation. Patient was then transferred to CCU at that time after ROSC and intubation. Pt was extubated in CCU and echo showed EF 35-40%, severe hypokinesis of anterior/septal/apical walls with severe diastolic dysfunction. Patient was transfused 1 unit PRBC for anemia and was started on iron supplementation. Pt transferred to [facility] cath lab for angiogram 7/27/17. Patient now transferred to [facility] CCU for close monitoring. s/p LHC showing severe multi vessel CAD; pLAd 95% stenosis, mLAD 99% stenosis, OM1 99% stenosis, % stenosis. EF 35%. Patient transferred to [facility] for CTS service for CABG evaluation.   Currently asymptomatic except for mild chest soreness 2/2 CPR. Denies SOB, chest pain, dizziness, n/v/d, fever, chills. (28 Jul 2017 00:49)    Patient has history of diabetes, on oral meds at home,  no recent hypoglycemic episodes, no polyuria polydipsia. Patient follows up with PCP for diabetes management.    PAST MEDICAL & SURGICAL HISTORY:  Anemia  DM (diabetes mellitus)  MI (myocardial infarction)  CAD (coronary artery disease)  CHF (congestive heart failure)  HLD (hyperlipidemia)  HTN (hypertension)  No significant past surgical history      FAMILY HISTORY:  No pertinent family history in first degree relatives      Social History:    Outpatient Medications:    MEDICATIONS  (STANDING):  aspirin enteric coated 81 milliGRAM(s) Oral daily  atorvastatin 80 milliGRAM(s) Oral at bedtime  ferrous    sulfate 325 milliGRAM(s) Oral daily  furosemide    Tablet 40 milliGRAM(s) Oral daily  potassium chloride    Tablet ER 20 milliEquivalent(s) Oral daily  pantoprazole    Tablet 40 milliGRAM(s) Oral before breakfast  insulin lispro (HumaLOG) corrective regimen sliding scale   SubCutaneous three times a day before meals  insulin lispro (HumaLOG) corrective regimen sliding scale   SubCutaneous at bedtime  heparin  Infusion 1000 Unit(s)/Hr (10 mL/Hr) IV Continuous <Continuous>  metoprolol 25 milliGRAM(s) Oral three times a day  insulin glargine Injectable (LANTUS) 12 Unit(s) SubCutaneous at bedtime  insulin lispro Injectable (HumaLOG) 6 Unit(s) SubCutaneous three times a day before meals  dextrose 5%. 1000 milliLiter(s) (50 mL/Hr) IV Continuous <Continuous>  dextrose 50% Injectable 12.5 Gram(s) IV Push once  dextrose 50% Injectable 25 Gram(s) IV Push once  dextrose 50% Injectable 25 Gram(s) IV Push once  insulin Infusion 6 Unit(s)/Hr (6 mL/Hr) IV Continuous <Continuous>    MEDICATIONS  (PRN):  acetaminophen   Tablet. 650 milliGRAM(s) Oral every 6 hours PRN Mild Pain (1 - 3)  dextrose Gel 1 Dose(s) Oral once PRN Blood Glucose LESS THAN 70 milliGRAM(s)/deciLiter  glucagon  Injectable 1 milliGRAM(s) IntraMuscular once PRN Glucose <70 milliGRAM(s)/deciLiter      Allergies    No Known Allergies    Intolerances      Review of Systems:  Constitutional: No fever, no chills  Eyes: No blurry vision  Neuro: No tremors  HEENT: No pain, no neck swelling  Cardiovascular: No chest pain, no palpitations  Respiratory: Has SOB, no cough  GI: No nausea, vomiting, abdominal pain  : No dysuria  Skin: no rash  MSK: Has leg swelling, no foot ulcers.  Psych: no depression  Endocrine: no polyuria, polydipsia    ALL OTHER SYSTEMS REVIEWED AND NEGATIVE    UNABLE TO OBTAIN    PHYSICAL EXAM:  VITALS: T(C): 36.8 (07-28-17 @ 14:00)  T(F): 98.2 (07-28-17 @ 14:00), Max: 98.2 (07-27-17 @ 23:00)  HR: 89 (07-28-17 @ 14:00) (89 - 95)  BP: 134/82 (07-28-17 @ 14:00) (119/68 - 138/84)  RR:  (18 - 26)  SpO2:  (94% - 100%)  Wt(kg): --  GENERAL: NAD, well-groomed, well-developed  EYES: No proptosis, no lid lag  HEENT:  Atraumatic, Normocephalic  THYROID: Normal size, no palpable nodules  RESPIRATORY: Clear to auscultation bilaterally; No rales, rhonchi, wheezing  CARDIOVASCULAR: Si S2, No murmurs;  GI: Soft, non distended, normal bowel sounds  SKIN: Dry, intact, No rashes or lesions  MUSCULOSKELETAL: Has BL lower extremity edema.  NEURO:  no tremor, sensation decreased in feet BL,    CAPILLARY BLOOD GLUCOSE  321 (07-28 @ 13:26)  307 (07-28 @ 11:58)  232 (07-28 @ 07:47)  285 (07-28 @ 01:10)                            10.1   7.1   )-----------( 263      ( 28 Jul 2017 03:39 )             33.7       07-28    133<L>  |  93<L>  |  13  ----------------------------<  277<H>  4.0   |  31  |  0.92    EGFR if : 101  EGFR if non : 87    Ca    8.1<L>      07-28  Mg     1.8     07-27  Phos  3.9     07-27    TPro  6.4  /  Alb  2.8<L>  /  TBili  0.8  /  DBili  x   /  AST  20  /  ALT  20  /  AlkPhos  73  07-27      Thyroid Function Tests:  07-28 @ 07:36 TSH 4.46 FreeT4 -- T3 -- Anti TPO -- Anti Thyroglobulin Ab -- TSI --  07-27 @ 19:00 TSH 0.01 FreeT4 -- T3 -- Anti TPO -- Anti Thyroglobulin Ab -- TSI --      Hemoglobin A1C, Whole Blood: 10.2 % <H> [4.0 - 5.6] (07-27-17 @ 16:58)          Radiology:

## 2017-07-28 NOTE — PROGRESS NOTE ADULT - PROBLEM SELECTOR PLAN 1
Will increase Lantus to 12 units at bed time.  Will increase Humalog to 6 units before each meal in addition to Humalog correction scale coverage.  Patient counseled for compliance with consistent low carb diet.

## 2017-07-28 NOTE — H&P ADULT - HISTORY OF PRESENT ILLNESS
68 y/o M w/ PMH of DM, HTN, CAD, Anemia, MI (?2007) and CHF being transferred from [facility] for cardiac catheretization. Pt was admitted to [place] for an acute CHF exacerbation and was diuresed with IV Lasix BID. During hospitalization in [facility], he had a stress test on 7/24/17 which showed a severe fixed defect involving the mid to apical gayatri-septal wall and EF of 34%. His admission was complicated by Vfib cardiac arrest and ROSC was achieved after 12 minutes after 3 doses of epinephrine and defibrillation. Patient was then transferred to CCU at that time after ROSC and intubation. Pt was extubated in CCU and echo showed EF 35-40%, severe hypokinesis of anterior/septal/apical walls with severe diastolic dysfunction. Patient was transfused 1 unit PRBC for anemia and was started on iron supplementation. Pt transferred to [facility] cath lab for angiogram 7/27/17. Patient now transferred to [facility] CCU for close monitoring. s/p LHC showing severe multi vessel CAD; pLAd 95% stenosis, mLAD 99% stenosis, OM1 99% stenosis, % stenosis. EF 35%. Patient transferred to [facility] for CTS service for CABG evaluation.   Currently asymptomatic except for mild chest soreness 2/2 CPR. Denies SOB, chest pain, dizziness, n/v/d, fever, chills. 68 y/o M w/ PMH of DM, HTN, CAD, Anemia, MI (?2007) and CHF being transferred from [facility] with triple vessel disease. Pt was admitted to [place] for an acute CHF exacerbation and was diuresed with IV Lasix BID. During hospitalization in [facility], he had a stress test on 7/24/17 which showed a severe fixed defect involving the mid to apical gayatri-septal wall and EF of 34%. His admission was complicated by Vfib cardiac arrest and ROSC was achieved after 12 minutes after 3 doses of epinephrine and defibrillation. Patient was then transferred to CCU at that time after ROSC and intubation. Pt was extubated in CCU and echo showed EF 35-40%, severe hypokinesis of anterior/septal/apical walls with severe diastolic dysfunction. Patient was transfused 1 unit PRBC for anemia and was started on iron supplementation. Pt transferred to [facility] cath lab for angiogram 7/27/17. Patient now transferred to [facility] CCU for close monitoring. s/p LHC showing severe multi vessel CAD; pLAd 95% stenosis, mLAD 99% stenosis, OM1 99% stenosis, % stenosis. EF 35%. Patient transferred to [facility] for CTS service for CABG evaluation.   Currently asymptomatic except for mild chest soreness 2/2 CPR. Denies SOB, chest pain, dizziness, n/v/d, fever, chills.

## 2017-07-28 NOTE — PROGRESS NOTE ADULT - PROBLEM SELECTOR PLAN 2
preop eval and workup for cabg  ck echo/ carotids/ pft's   shower daily  heparin gttp   asa/ bblockers/ stain  OR monday with DR. Roa

## 2017-07-28 NOTE — CONSULT NOTE ADULT - SUBJECTIVE AND OBJECTIVE BOX
CHIEF COMPLAINT: Chest pain found to have multivessel CAD requiring CABG    HISTORY OF PRESENT ILLNESS:  History is obtained from daughter at bedside, as patient is Tamil speaking.  This is a 66 y/o M  from [facility] with uncontrolled DM (HbA1c-10%), HTN, CAD, Anemia, MI (?2007) who is transferred to [facility] for CABG. Mr. Lundberg was initially admitted to [place] for an acute CHF exacerbation and was diuresed with IV Lasix BID. During hospitalization in [facility], he had a stress test on 7/24/17 which showed a severe fixed defect involving the mid to apical gayatri-septal wall and EF of 34%. His admission was complicated by Vfib cardiac arrest and ROSC was achieved after 12 minutes after 3 doses of epinephrine and defibrillation. Patient was then transferred to CCU at that time after ROSC and intubation. Pt was extubated in CCU and echo showed EF 35-40%, severe hypokinesis of anterior/septal/apical walls with severe diastolic dysfunction. Patient was transfused 1 unit PRBC for anemia and was started on iron supplementation. Mr. Lundberg was subsequently transferred to [facility] cath lab for angiogram 7/27/17. Cath revealed severe multi vessel CAD; pLAd 95% stenosis, mLAD 99% stenosis, OM1 99% stenosis, % stenosis. EF 35%. Patient now transferred to [facility] for CTS service for CABG evaluation by Dr. Roa.   Currently Mr. Lundberg feels well. He has mild chest pain during coughing 2/2 CPR. Denies SOB, chest pain, dizziness, n/v/d, fever, chills.    Allergies    No Known Allergies      MEDICATIONS:  aspirin enteric coated 81 milliGRAM(s) Oral daily  furosemide    Tablet 40 milliGRAM(s) Oral daily  metoprolol 25 milliGRAM(s) Oral two times a day  enoxaparin Injectable 40 milliGRAM(s) SubCutaneous daily        acetaminophen   Tablet. 650 milliGRAM(s) Oral every 6 hours PRN    pantoprazole    Tablet 40 milliGRAM(s) Oral before breakfast    insulin glargine Injectable (LANTUS) 17 Unit(s) SubCutaneous at bedtime  atorvastatin 80 milliGRAM(s) Oral at bedtime  insulin lispro (HumaLOG) corrective regimen sliding scale   SubCutaneous three times a day before meals  insulin lispro (HumaLOG) corrective regimen sliding scale   SubCutaneous at bedtime    ferrous    sulfate 325 milliGRAM(s) Oral daily  potassium chloride    Tablet ER 20 milliEquivalent(s) Oral daily      PAST MEDICAL & SURGICAL HISTORY:  Anemia  DM (diabetes mellitus)  MI (myocardial infarction)  CAD (coronary artery disease)  CHF (congestive heart failure)  HLD (hyperlipidemia)  HTN (hypertension)  No significant past surgical history      FAMILY HISTORY:  CAD      SOCIAL HISTORY:    Non-smoker          REVIEW OF SYSTEMS:  See HPI, otherwise complete 10 point review of systems negative      PHYSICAL EXAM:  T(C): 36.7 (07-28-17 @ 03:35), Max: 36.8 (07-27-17 @ 23:00)  HR: 92 (07-28-17 @ 03:35) (89 - 95)  BP: 134/87 (07-28-17 @ 03:35) (119/68 - 138/84)  RR: 18 (07-28-17 @ 03:35) (18 - 26)  SpO2: 95% (07-28-17 @ 03:35) (94% - 100%)  Wt(kg): --  I&O's Summary    27 Jul 2017 07:01  -  28 Jul 2017 07:00  --------------------------------------------------------  IN: 120 mL / OUT: 300 mL / NET: -180 mL        Appearance: No Acute Distress; Laying flat	  HEENT:  Normal oral mucosa, PERRL, EOMI	  Cardiovascular: Normal S1 S2, No JVD, No murmurs/rubs/gallops  Respiratory: Lungs clear to auscultation bilaterally  Gastrointestinal:  Soft, Non-tender, + BS	  Skin: No rashes, No ecchymoses, No cyanosis	  Neurologic: Non-focal  Extremities: No clubbing, cyanosis or edema  Vascular: Peripheral pulses palpable 2+ bilaterally  Psychiatry: A & O x 3, Mood & affect appropriate    Laboratory Data:	 	    CBC Full  -  ( 28 Jul 2017 03:39 )  WBC Count : 7.1 K/uL  Hemoglobin : 10.1 g/dL  Hematocrit : 33.7 %  Platelet Count - Automated : 263 K/uL  Mean Cell Volume : 82.8 fl  Mean Cell Hemoglobin : 24.9 pg  Mean Cell Hemoglobin Concentration : 30.1 gm/dL  Auto Neutrophil # : x  Auto Lymphocyte # : x  Auto Monocyte # : x  Auto Eosinophil # : x  Auto Basophil # : x  Auto Neutrophil % : x  Auto Lymphocyte % : x  Auto Monocyte % : x  Auto Eosinophil % : x  Auto Basophil % : x    07-28    133<L>  |  93<L>  |  13  ----------------------------<  277<H>  4.0   |  31  |  0.92  07-27    133<L>  |  91<L>  |  13  ----------------------------<  247<H>  3.0<L>   |  31  |  0.84    Ca    8.1<L>      28 Jul 2017 03:39  Ca    7.8<L>      27 Jul 2017 19:00  Phos  3.9     07-27  Mg     1.8     07-27    TPro  6.4  /  Alb  2.8<L>  /  TBili  0.8  /  DBili  x   /  AST  20  /  ALT  20  /  AlkPhos  73  07-27      proBNP: Serum Pro-Brain Natriuretic Peptide: 2447 pg/mL (07-28 @ 03:39)  Serum Pro-Brain Natriuretic Peptide: 14.13 pg/mL (07-27 @ 19:00)    Lipid Profile:   HgA1c: Hemoglobin A1C, Whole Blood: 10.2 % (07-27 @ 16:58)    TSH: Thyroid Stimulating Hormone, Serum: 0.01 uIU/mL (07-27 @ 19:00)          Interpretation of Telemetry: Sinus 70-90s; occasional PVDs	    ECG: Sinus; 1st degree AV delay; Q waves V3-6	      Assessment: 67 year old man with uncontrolled DM and multivessel CAD now presents for CABG    Plan of Care:    #Multivessel CAD- currently chest pain free with no signs or symptoms of active ischemia. Plan for CABG with Dr. Roa tentatively scheduled for 7/31. Plavix and ARB held in anticipation of surgery. Continue ASA, beta blocker and high intensity statin.     #Compensated systolic CHF- euvolemic on exam and laying flat. Continue current dose Lasix. ARB held in anticipation of Sx.     #HTN- BP well controlled on current regimen    #Uncontrolled DM II- basal/bolus insulin    Rest of care as per CT surgery team.   Care discussed at length with patient and daughter at bedside.    120 minutes spent on total encounter; more than 50% of the visit was spent counseling and/or coordinating care by the attending physician.   	  Brayan Devlin M.D.  Cardiovascular Diseases  [phone]

## 2017-07-28 NOTE — PROGRESS NOTE ADULT - SUBJECTIVE AND OBJECTIVE BOX
VITAL SIGNS    Telemetry:  rsr    Vital Signs Last 24 Hrs  T(C): 36.7 (17 @ 03:35), Max: 36.8 (17 @ 23:00)  T(F): 98 (17 @ 03:35), Max: 98.2 (17 @ 23:00)  HR: 92 (17 @ 03:35) (89 - 95)  BP: 134/87 (17 @ 03:35) (119/68 - 138/84)  RR: 18 (17 @ 03:35) (18 - 26)  SpO2: 95% (17 @ 03:35) (94% - 100%)             @ 07:01  -   @ 07:00  --------------------------------------------------------  IN: 120 mL / OUT: 300 mL / NET: -180 mL       Daily Height in cm: 167.64 (2017 00:25)    Daily Weight in k (2017 08:12)  Admit Wt: Drug Dosing Weight  Height (cm): 167.64 (2017 00:25)  Weight (kg): 90.1 (2017 00:25)  BMI (kg/m2): 32.1 (2017 00:25)  BSA (m2): 1.99 (2017 00:25)    Bilirubin Total, Serum: 0.8 mg/dL ( @ 19:00)    CAPILLARY BLOOD GLUCOSE  307 (2017 11:58)  232 (2017 07:47)  285 (2017 01:10)                PHYSICAL EXAM    Subjective: "Hi."   Neurology: alert and oriented x 3, nonfocal, no gross deficits;   [place] speaking only   CV : tele:  RSR     Lungs: clear. RR easy, unlabored   Abdomen: soft, nontender, nondistended, positive bowel sounds, bowel movement   Neg N/V/D   :  pt voiding without difficulty   Extremities:   RICHMOND; trace LE  edema, neg calf tenderness.   PPP bilaterally ; right groin neg hematoma/ bleed

## 2017-07-28 NOTE — PROGRESS NOTE ADULT - PROBLEM SELECTOR PLAN 1
diabetic diet  ck accuchecks ac and hs  endo consult with Dr. Klaudia carmen/ carolyn initiated today

## 2017-07-28 NOTE — PROGRESS NOTE ADULT - ASSESSMENT
Assessment  DMT2: 67y Male with DM T2 with hyperglycemia  blood sugars running high, eating meals,  compliant with low carb diet.  HTN: On meds, controlled  HLD:  on statin, tolerating.  CAD: On treatment, planing surgery.

## 2017-07-28 NOTE — PROGRESS NOTE ADULT - ASSESSMENT
66 y/o M w/ PMH of DM, HTN, CAD, Anemia, MI (?2007) and CHF being transferred from [facility] with triple vessel disease. Pt was admitted to [place] for an acute CHF exacerbation and was diuresed with IV Lasix BID. During hospitalization in [facility], he had a stress test on 7/24/17 which showed a severe fixed defect involving the mid to apical gayatri-septal wall and EF of 34%. His admission was complicated by Vfib cardiac arrest and ROSC was achieved after 12 minutes after 3 doses of epinephrine and defibrillation. Patient was then transferred to CCU at that time after ROSC and intubation. Pt was extubated in CCU and echo showed EF 35-40%, severe hypokinesis of anterior/septal/apical walls with severe diastolic dysfunction. Patient was transfused 1 unit PRBC for anemia and was started on iron supplementation. Pt transferred to [facility] cath lab for angiogram 7/27/17. Patient now transferred to [facility] CCU for close monitoring. s/p LHC showing severe multi vessel CAD; pLAd 95% stenosis, mLAD 99% stenosis, OM1 99% stenosis, % stenosis. EF 35%. Patient transferred to [facility] for CTS service for CABG evaluation 7/27 with Dr. Roa   66 y/o M w/ PMH of DM, HTN, CAD, Anemia, MI (?2007) and CHF being transferred from [facility] for cardiac catheretization. Pt was admitted to [place] for an acute CHF exacerbation and was diuresed with IV Lasix BID. During hospitalization in [facility], he had a stress test on 7/24/17 which showed a severe fixed defect involving the mid to apical gayatri-septal wall and EF of 34%. His admission was complicated by Vfib cardiac arrest and ROSC was achieved after 12 minutes after 3 doses of epinephrine and defibrillation. Patient was then transferred to CCU at that time after ROSC and intubation. Pt was extubated in CCU and echo showed EF 35-40%, severe hypokinesis of anterior/septal/apical walls with severe diastolic dysfunction. Patient was transfused 1 unit PRBC for anemia and was started on iron supplementation. Pt transferred to [facility] cath lab for angiogram 7/27/17. Patient now transferred to [facility] CCU for close monitoring. s/p LHC showing severe multi vessel CAD; pLAd 95% stenosis, mLAD 99% stenosis, OM1 99% stenosis, % stenosis. EF 35%. Patient transferred to [facility] for CTS service for CABG evaluation.   Currently asymptomatic except for mild chest soreness 2/2 CPR. Denies SOB, chest pain, dizziness, n/v/d, fever, chills.    Preop workup in progress.   7/28 heparin initiated for CAD as per Dr. Roa  endo consult with Dr. Martinez for uncontrolled dm- aic 10.2   increase b-blockers as tolerated

## 2017-07-28 NOTE — H&P ADULT - ASSESSMENT
67M 67M PMH DM (A1c 10.2), HTN, CAD, Anemia, MI (?2007), CHF, vfib arrest s/p defibrillation transferred from [facility] after cardiac cath in right femoral approach revealed triple vessel disease for cardiac surgery evaluation. Now asymptomatic. Femostop intact in R groin s/p cardiac cath, s/p sheath removal @ 2130. Cardiology fellow called to discuss femostop removal.

## 2017-07-28 NOTE — CHART NOTE - NSCHARTNOTEFT_GEN_A_CORE
Cardiology fellow and I examined patient's femostop at the bedside, checked pressure, checked cath site and removed femostop since it had been at 0mmHg pressure since arrival and no sign of hematoma. Q15h right groin checks x 2 hours ordered.

## 2017-07-29 LAB
ANION GAP SERPL CALC-SCNC: 12 MMOL/L — SIGNIFICANT CHANGE UP (ref 5–17)
APTT BLD: 40.4 SEC — HIGH (ref 27.5–37.4)
APTT BLD: 63.5 SEC — HIGH (ref 27.5–37.4)
APTT BLD: 79 SEC — HIGH (ref 27.5–37.4)
APTT BLD: 84.2 SEC — HIGH (ref 27.5–37.4)
BUN SERPL-MCNC: 15 MG/DL — SIGNIFICANT CHANGE UP (ref 7–23)
CALCIUM SERPL-MCNC: 7.9 MG/DL — LOW (ref 8.4–10.5)
CHLORIDE SERPL-SCNC: 97 MMOL/L — SIGNIFICANT CHANGE UP (ref 96–108)
CO2 SERPL-SCNC: 28 MMOL/L — SIGNIFICANT CHANGE UP (ref 22–31)
CREAT SERPL-MCNC: 0.9 MG/DL — SIGNIFICANT CHANGE UP (ref 0.5–1.3)
GLUCOSE SERPL-MCNC: 139 MG/DL — HIGH (ref 70–99)
HCT VFR BLD CALC: 34.3 % — LOW (ref 39–50)
HGB BLD-MCNC: 10.4 G/DL — LOW (ref 13–17)
MCHC RBC-ENTMCNC: 25.1 PG — LOW (ref 27–34)
MCHC RBC-ENTMCNC: 30.2 GM/DL — LOW (ref 32–36)
MCV RBC AUTO: 83.1 FL — SIGNIFICANT CHANGE UP (ref 80–100)
PLATELET # BLD AUTO: 266 K/UL — SIGNIFICANT CHANGE UP (ref 150–400)
POTASSIUM SERPL-MCNC: 3.8 MMOL/L — SIGNIFICANT CHANGE UP (ref 3.5–5.3)
POTASSIUM SERPL-SCNC: 3.8 MMOL/L — SIGNIFICANT CHANGE UP (ref 3.5–5.3)
RBC # BLD: 4.13 M/UL — LOW (ref 4.2–5.8)
RBC # FLD: 18.9 % — HIGH (ref 10.3–14.5)
SODIUM SERPL-SCNC: 137 MMOL/L — SIGNIFICANT CHANGE UP (ref 135–145)
T4 FREE SERPL-MCNC: 1.3 NG/DL — SIGNIFICANT CHANGE UP (ref 0.9–1.8)
TSH SERPL-MCNC: 7.6 UIU/ML — HIGH (ref 0.27–4.2)
WBC # BLD: 8.4 K/UL — SIGNIFICANT CHANGE UP (ref 3.8–10.5)
WBC # FLD AUTO: 8.4 K/UL — SIGNIFICANT CHANGE UP (ref 3.8–10.5)

## 2017-07-29 PROCEDURE — 99231 SBSQ HOSP IP/OBS SF/LOW 25: CPT

## 2017-07-29 RX ORDER — OXYCODONE AND ACETAMINOPHEN 5; 325 MG/1; MG/1
1 TABLET ORAL EVERY 4 HOURS
Qty: 0 | Refills: 0 | Status: DISCONTINUED | OUTPATIENT
Start: 2017-07-29 | End: 2017-07-31

## 2017-07-29 RX ORDER — INSULIN LISPRO 100/ML
9 VIAL (ML) SUBCUTANEOUS
Qty: 0 | Refills: 0 | Status: DISCONTINUED | OUTPATIENT
Start: 2017-07-29 | End: 2017-07-31

## 2017-07-29 RX ORDER — POTASSIUM CHLORIDE 20 MEQ
20 PACKET (EA) ORAL ONCE
Qty: 0 | Refills: 0 | Status: COMPLETED | OUTPATIENT
Start: 2017-07-29 | End: 2017-07-29

## 2017-07-29 RX ADMIN — MUPIROCIN 1 APPLICATION(S): 20 OINTMENT TOPICAL at 06:43

## 2017-07-29 RX ADMIN — Medication 20 MILLIEQUIVALENT(S): at 11:30

## 2017-07-29 RX ADMIN — Medication 6 UNIT(S): at 11:29

## 2017-07-29 RX ADMIN — Medication 2: at 11:29

## 2017-07-29 RX ADMIN — Medication 4: at 17:13

## 2017-07-29 RX ADMIN — PANTOPRAZOLE SODIUM 40 MILLIGRAM(S): 20 TABLET, DELAYED RELEASE ORAL at 06:44

## 2017-07-29 RX ADMIN — Medication 25 MILLIGRAM(S): at 21:03

## 2017-07-29 RX ADMIN — HEPARIN SODIUM 12 UNIT(S)/HR: 5000 INJECTION INTRAVENOUS; SUBCUTANEOUS at 12:00

## 2017-07-29 RX ADMIN — Medication 325 MILLIGRAM(S): at 11:30

## 2017-07-29 RX ADMIN — Medication 40 MILLIGRAM(S): at 06:44

## 2017-07-29 RX ADMIN — Medication 9 UNIT(S): at 17:13

## 2017-07-29 RX ADMIN — MUPIROCIN 1 APPLICATION(S): 20 OINTMENT TOPICAL at 17:15

## 2017-07-29 RX ADMIN — Medication 20 MILLIEQUIVALENT(S): at 06:43

## 2017-07-29 RX ADMIN — ATORVASTATIN CALCIUM 80 MILLIGRAM(S): 80 TABLET, FILM COATED ORAL at 21:03

## 2017-07-29 RX ADMIN — Medication 6 UNIT(S): at 07:49

## 2017-07-29 RX ADMIN — INSULIN GLARGINE 12 UNIT(S): 100 INJECTION, SOLUTION SUBCUTANEOUS at 21:57

## 2017-07-29 RX ADMIN — Medication 25 MILLIGRAM(S): at 06:44

## 2017-07-29 RX ADMIN — INSULIN HUMAN 6 UNIT(S)/HR: 100 INJECTION, SOLUTION SUBCUTANEOUS at 18:02

## 2017-07-29 RX ADMIN — Medication 81 MILLIGRAM(S): at 11:30

## 2017-07-29 RX ADMIN — HEPARIN SODIUM 11 UNIT(S)/HR: 5000 INJECTION INTRAVENOUS; SUBCUTANEOUS at 08:00

## 2017-07-29 RX ADMIN — Medication 25 MILLIGRAM(S): at 14:50

## 2017-07-29 NOTE — PROGRESS NOTE ADULT - PROBLEM SELECTOR PLAN 1
diabetic diet  ck accuchecks ac and hs  endo consult with Dr. Martinez- lantus/ humalog initiated today  insulin gttp weaned off- ? resume now - discuss with Dr. Martinez

## 2017-07-29 NOTE — PROGRESS NOTE ADULT - SUBJECTIVE AND OBJECTIVE BOX
Cardiovascular Disease Progress Note    Overnight events: No acute events overnight. No chest pain or SOB.  Otherwise review of systems negative  Objective Findings:  T(C): 36.7 (17 @ 07:06), Max: 37 (17 @ 19:47)  HR: 79 (17 @ 07:06) (76 - 89)  BP: 97/58 (17 @ 07:06) (97/58 - 134/82)  RR: 18 (17 @ 07:06) (16 - 18)  SpO2: 100% (17 @ 07:06) (96% - 100%)  Wt(kg): --  Daily     Daily Weight in k (2017 08:12)      Physical Exam:  Gen: NAD  HEENT: EOMI  CV: RRR, normal S1 + S2, no m/r/g  Lungs: CTAB  Abd: soft, non-tender  Ext: No edema    Telemetry: Sinus 70-80s; 4 beats WCT yesterday    Laboratory Data:                        10.4   8.4   )-----------( 266      ( 2017 00:28 )             34.3     07-    137  |  97  |  15  ----------------------------<  139<H>  3.8   |  28  |  0.90    Ca    7.9<L>      2017 00:28  Phos  3.9     07-  Mg     1.8     07-27    TPro  6.4  /  Alb  2.8<L>  /  TBili  0.8  /  DBili  x   /  AST  20  /  ALT  20  /  AlkPhos  73  07-27    PT/INR - ( 2017 03:39 )   PT: 11.7 sec;   INR: 1.08 ratio         PTT - ( 2017 06:50 )  PTT:40.4 sec  CARDIAC MARKERS ( 2017 19:00 )  x     / < 0.06 ng/mL / 99 u/L / 1.31 ng/mL / x              Inpatient Medications:  MEDICATIONS  (STANDING):  aspirin enteric coated 81 milliGRAM(s) Oral daily  atorvastatin 80 milliGRAM(s) Oral at bedtime  ferrous    sulfate 325 milliGRAM(s) Oral daily  furosemide    Tablet 40 milliGRAM(s) Oral daily  potassium chloride    Tablet ER 20 milliEquivalent(s) Oral daily  pantoprazole    Tablet 40 milliGRAM(s) Oral before breakfast  insulin lispro (HumaLOG) corrective regimen sliding scale   SubCutaneous three times a day before meals  insulin lispro (HumaLOG) corrective regimen sliding scale   SubCutaneous at bedtime  heparin  Infusion 1000 Unit(s)/Hr (11 mL/Hr) IV Continuous <Continuous>  metoprolol 25 milliGRAM(s) Oral three times a day  insulin glargine Injectable (LANTUS) 12 Unit(s) SubCutaneous at bedtime  insulin lispro Injectable (HumaLOG) 6 Unit(s) SubCutaneous three times a day before meals  dextrose 5%. 1000 milliLiter(s) (50 mL/Hr) IV Continuous <Continuous>  dextrose 50% Injectable 12.5 Gram(s) IV Push once  dextrose 50% Injectable 25 Gram(s) IV Push once  dextrose 50% Injectable 25 Gram(s) IV Push once  insulin Infusion 6 Unit(s)/Hr (6 mL/Hr) IV Continuous <Continuous>  mupirocin 2% Ointment 1 Application(s) Topical two times a day      Assessment: 67 year old man with uncontrolled DM and multivessel CAD now presents for CABG    #Multivessel CAD- currently chest pain free with no signs or symptoms of active ischemia. Plan for CABG with Dr. Roa tentatively scheduled for . Plavix and ARB held in anticipation of surgery. Continue ASA, beta blocker and high intensity statin. TTE and carotid duplex results noted.    #Compensated systolic CHF- euvolemic on exam and laying flat. Continue current dose Lasix. ARB held in anticipation of Sx.     #HTN- BP well controlled on current regimen    #Uncontrolled DM II- basal/bolus insulin. Endo input appreciated    Rest of care as per CT surgery team.       Over 35 minutes spent on total encounter; more than 50% of the visit was spent counseling and/or coordinating care by the attending physician.      Brayan Devlin M.D.   Cardiovascular Disease  [phone]

## 2017-07-29 NOTE — PROGRESS NOTE ADULT - SUBJECTIVE AND OBJECTIVE BOX
VITAL SIGNS    Telemetry:   1st 70-90  Vital Signs Last 24 Hrs  T(C): 36.7 (07-29-17 @ 15:21), Max: 37 (07-28-17 @ 19:47)  T(F): 98 (07-29-17 @ 15:21), Max: 98.6 (07-28-17 @ 19:47)  HR: 78 (07-29-17 @ 15:21) (75 - 85)  BP: 103/55 (07-29-17 @ 15:21) (97/58 - 119/71)  RR: 18 (07-29-17 @ 15:21) (18 - 18)  SpO2: 100% (07-29-17 @ 15:21) (100% - 100%)            07-28 @ 07:01  -  07-29 @ 07:00  --------------------------------------------------------  IN: 610 mL / OUT: 500 mL / NET: 110 mL    07-29 @ 07:01  -  07-29 @ 16:41  --------------------------------------------------------  IN: 824 mL / OUT: 450 mL / NET: 374 mL       Daily     Daily   Admit Wt: Drug Dosing Weight  Height (cm): 167.64 (28 Jul 2017 00:25)  Weight (kg): 90.1 (28 Jul 2017 00:25)  BMI (kg/m2): 32.1 (28 Jul 2017 00:25)  BSA (m2): 1.99 (28 Jul 2017 00:25)      CAPILLARY BLOOD GLUCOSE  342 (29 Jul 2017 16:21)  243 (29 Jul 2017 11:07)  148 (29 Jul 2017 06:02)  128 (29 Jul 2017 04:14)  128 (29 Jul 2017 02:10)  126 (29 Jul 2017 00:24)  155 (28 Jul 2017 23:32)  105 (28 Jul 2017 22:22)  80 (28 Jul 2017 21:48)  77 (28 Jul 2017 21:15)  88 (28 Jul 2017 20:47)  148 (28 Jul 2017 19:47)  188 (28 Jul 2017 18:47)  186 (28 Jul 2017 17:45)  242 (28 Jul 2017 16:49)                PHYSICAL EXAM    Subjective: "Hi.   Neurology: alert and oriented x 3, nonfocal, no gross deficits  CV : tele: sr 1st 70-90  Lungs: clear. RR easy, unlabored   Abdomen: soft, nontender, nondistended, positive bowel sounds,+ bowel movement   Neg N/V/D; obese abdomen   :  pt voiding without difficulty   Extremities:   RICHMOND; edema, neg calf tenderness.   PPP bilaterally; right groin cdi, neg hematoma, neg bleeding

## 2017-07-29 NOTE — PROGRESS NOTE ADULT - ASSESSMENT
66 y/o M w/ PMH of DM, HTN, CAD, Anemia, MI (?2007) and CHF being transferred from [facility] with triple vessel disease. Pt was admitted to [place] for an acute CHF exacerbation and was diuresed with IV Lasix BID. During hospitalization in [facility], he had a stress test on 7/24/17 which showed a severe fixed defect involving the mid to apical gayatri-septal wall and EF of 34%. His admission was complicated by Vfib cardiac arrest and ROSC was achieved after 12 minutes after 3 doses of epinephrine and defibrillation. Patient was then transferred to CCU at that time after ROSC and intubation. Pt was extubated in CCU and echo showed EF 35-40%, severe hypokinesis of anterior/septal/apical walls with severe diastolic dysfunction. Patient was transfused 1 unit PRBC for anemia and was started on iron supplementation. Pt transferred to [facility] cath lab for angiogram 7/27/17. Patient now transferred to [facility] CCU for close monitoring. s/p LHC showing severe multi vessel CAD; pLAd 95% stenosis, mLAD 99% stenosis, OM1 99% stenosis, % stenosis. EF 35%. Patient transferred to [facility] for CTS service for CABG evaluation 7/27 with Dr. Roa   66 y/o M w/ PMH of DM, HTN, CAD, Anemia, MI (?2007) and CHF being transferred from [facility] for cardiac catheretization. Pt was admitted to [place] for an acute CHF exacerbation and was diuresed with IV Lasix BID. During hospitalization in [facility], he had a stress test on 7/24/17 which showed a severe fixed defect involving the mid to apical gayatri-septal wall and EF of 34%. His admission was complicated by Vfib cardiac arrest and ROSC was achieved after 12 minutes after 3 doses of epinephrine and defibrillation. Patient was then transferred to CCU at that time after ROSC and intubation. Pt was extubated in CCU and echo showed EF 35-40%, severe hypokinesis of anterior/septal/apical walls with severe diastolic dysfunction. Patient was transfused 1 unit PRBC for anemia and was started on iron supplementation. Pt transferred to [facility] cath lab for angiogram 7/27/17. Patient now transferred to [facility] CCU for close monitoring. s/p LHC showing severe multi vessel CAD; pLAd 95% stenosis, mLAD 99% stenosis, OM1 99% stenosis, % stenosis. EF 35%. Patient transferred to [facility] for CTS service for CABG evaluation.   Currently asymptomatic except for mild chest soreness 2/2 CPR. Denies SOB, chest pain, dizziness, n/v/d, fever, chills.    Preop workup in progress.   7/28 heparin initiated for CAD as per Dr. Roa  endo consult with Dr. Martinez for uncontrolled dm- aic 10.2 - 7/28 insulin gttp initiated- pt transferred to [facility] for gttp   increase b-blockers as tolerated   7/28 echo: . Mildly dilated left atrium.  LA volume index = 35 cc/m2.  2. Severe segmental left ventricular systolic dysfunction.  Akinesis of the mid to apical anteroseptum, apex,  inferolateral wall.   Endocardial visualization enhanced  with intravenous injection of echo contrast (Definity).  Severe hypokinesis of the remaining segments.  3. Severe  diastolic dysfunction (Stage III).  4. Decreased right ventricular systolic function.  5. Right pleural effusion.  7/29 carotids neg sig stenosis  OR planned for monday 7/31 with DR. Roa

## 2017-07-29 NOTE — PROGRESS NOTE ADULT - SUBJECTIVE AND OBJECTIVE BOX
Chief complaint  Patient is a 67y old  Male who presents with a chief complaint of I need heart surgery (28 Jul 2017 00:49)   Review of systems  Patient in bed, comfortable, no fever,  eating meals, no hypoglycemia.    Labs and Fingersticks    CAPILLARY BLOOD GLUCOSE  342 (29 Jul 2017 16:21)  243 (29 Jul 2017 11:07)  148 (29 Jul 2017 06:02)  128 (29 Jul 2017 04:14)  128 (29 Jul 2017 02:10)  126 (29 Jul 2017 00:24)  155 (28 Jul 2017 23:32)  105 (28 Jul 2017 22:22)  80 (28 Jul 2017 21:48)  77 (28 Jul 2017 21:15)  88 (28 Jul 2017 20:47)  148 (28 Jul 2017 19:47)  188 (28 Jul 2017 18:47)  186 (28 Jul 2017 17:45)  242 (28 Jul 2017 16:49)  306 (28 Jul 2017 15:37)  321 (28 Jul 2017 13:26)  307 (28 Jul 2017 11:58)  232 (28 Jul 2017 07:47)  285 (28 Jul 2017 01:10)    Anion Gap, Serum: 12 (07-29 @ 00:28)  Anion Gap, Serum: 9 (07-28 @ 03:39)      Calcium, Total Serum: 7.9 <L> (07-29 @ 00:28)  Calcium, Total Serum: 8.1 <L> (07-28 @ 03:39)  Calcium, Total Serum: 7.8 <L> (07-27 @ 19:00)  Albumin, Serum: 2.8 <L> (07-27 @ 19:00)    Alanine Aminotransferase (ALT/SGPT): 20 (07-27 @ 19:00)  Alkaline Phosphatase, Serum: 73 (07-27 @ 19:00)  Aspartate Aminotransferase (AST/SGOT): 20 (07-27 @ 19:00)        07-29    137  |  97  |  15  ----------------------------<  139<H>  3.8   |  28  |  0.90    Ca    7.9<L>      29 Jul 2017 00:28  Phos  3.9     07-27  Mg     1.8     07-27    TPro  6.4  /  Alb  2.8<L>  /  TBili  0.8  /  DBili  x   /  AST  20  /  ALT  20  /  AlkPhos  73  07-27                        10.4   8.4   )-----------( 266      ( 29 Jul 2017 00:28 )             34.3     Medications  MEDICATIONS  (STANDING):  aspirin enteric coated 81 milliGRAM(s) Oral daily  atorvastatin 80 milliGRAM(s) Oral at bedtime  ferrous    sulfate 325 milliGRAM(s) Oral daily  furosemide    Tablet 40 milliGRAM(s) Oral daily  potassium chloride    Tablet ER 20 milliEquivalent(s) Oral daily  pantoprazole    Tablet 40 milliGRAM(s) Oral before breakfast  insulin lispro (HumaLOG) corrective regimen sliding scale   SubCutaneous three times a day before meals  insulin lispro (HumaLOG) corrective regimen sliding scale   SubCutaneous at bedtime  heparin  Infusion 1000 Unit(s)/Hr (12 mL/Hr) IV Continuous <Continuous>  metoprolol 25 milliGRAM(s) Oral three times a day  insulin glargine Injectable (LANTUS) 12 Unit(s) SubCutaneous at bedtime  dextrose 5%. 1000 milliLiter(s) (50 mL/Hr) IV Continuous <Continuous>  dextrose 50% Injectable 12.5 Gram(s) IV Push once  dextrose 50% Injectable 25 Gram(s) IV Push once  dextrose 50% Injectable 25 Gram(s) IV Push once  insulin Infusion 6 Unit(s)/Hr (6 mL/Hr) IV Continuous <Continuous>  mupirocin 2% Ointment 1 Application(s) Topical two times a day  insulin lispro Injectable (HumaLOG) 9 Unit(s) SubCutaneous three times a day before meals      Physical Exam  General: Patient comfortable in bed  Vital Signs Last 12 Hrs  T(F): 98 (07-29-17 @ 15:21), Max: 98.1 (07-29-17 @ 07:06)  HR: 78 (07-29-17 @ 15:21) (75 - 79)  BP: 103/55 (07-29-17 @ 15:21) (97/58 - 103/55)  BP(mean): --  RR: 18 (07-29-17 @ 15:21) (18 - 18)  SpO2: 100% (07-29-17 @ 15:21) (100% - 100%)  Neck: No palpable thyroid nodules.  CVS: S1S2, No murmurs  Respiratory: No wheezing, no crepitations  GI: Abdomen soft, bowel sounds positive  Musculoskeletal: Positive edema lower extremities bilaterally  Skin: No skin rashes, no ecchimosis    Diagnostics    Thyroid Stimulating Hormone, Serum: AM Sched. Collection: 29-Jul-2017 06:00 (07-28 @ 11:45)  Free Thyroxine, Serum: AM Sched. Collection: 29-Jul-2017 06:00 (07-28 @ 11:45)

## 2017-07-29 NOTE — PROGRESS NOTE ADULT - PROBLEM SELECTOR PLAN 1
Will increase Lantus to 12 units at bed time.  Will increase Humalog to 9 units before each meal in addition to Humalog correction scale coverage.  Patient counseled for compliance with consistent low carb diet.

## 2017-07-29 NOTE — PROGRESS NOTE ADULT - PROBLEM SELECTOR PLAN 2
preop eval and workup for cabg  ck echo/ carotids/ pft's/ vein mapping done  or monday 7/31 with dR. Roa   heparin gttp   asa/ bblockers/ stain  OR monday with DR. Roa

## 2017-07-30 LAB
ANION GAP SERPL CALC-SCNC: 12 MMOL/L — SIGNIFICANT CHANGE UP (ref 5–17)
APTT BLD: 35.5 SEC — SIGNIFICANT CHANGE UP (ref 27.5–37.4)
APTT BLD: 70.3 SEC — HIGH (ref 27.5–37.4)
APTT BLD: 91.3 SEC — HIGH (ref 27.5–37.4)
BLD GP AB SCN SERPL QL: NEGATIVE — SIGNIFICANT CHANGE UP
BUN SERPL-MCNC: 15 MG/DL — SIGNIFICANT CHANGE UP (ref 7–23)
CALCIUM SERPL-MCNC: 7.9 MG/DL — LOW (ref 8.4–10.5)
CHLORIDE SERPL-SCNC: 99 MMOL/L — SIGNIFICANT CHANGE UP (ref 96–108)
CO2 SERPL-SCNC: 24 MMOL/L — SIGNIFICANT CHANGE UP (ref 22–31)
CREAT SERPL-MCNC: 0.98 MG/DL — SIGNIFICANT CHANGE UP (ref 0.5–1.3)
GLUCOSE SERPL-MCNC: 137 MG/DL — HIGH (ref 70–99)
HCT VFR BLD CALC: 34.9 % — LOW (ref 39–50)
HGB BLD-MCNC: 10.3 G/DL — LOW (ref 13–17)
MAGNESIUM SERPL-MCNC: 1.6 MG/DL — SIGNIFICANT CHANGE UP (ref 1.6–2.6)
MCHC RBC-ENTMCNC: 24.7 PG — LOW (ref 27–34)
MCHC RBC-ENTMCNC: 29.4 GM/DL — LOW (ref 32–36)
MCV RBC AUTO: 84 FL — SIGNIFICANT CHANGE UP (ref 80–100)
PLATELET # BLD AUTO: 298 K/UL — SIGNIFICANT CHANGE UP (ref 150–400)
POTASSIUM SERPL-MCNC: 3.5 MMOL/L — SIGNIFICANT CHANGE UP (ref 3.5–5.3)
POTASSIUM SERPL-SCNC: 3.5 MMOL/L — SIGNIFICANT CHANGE UP (ref 3.5–5.3)
RBC # BLD: 4.16 M/UL — LOW (ref 4.2–5.8)
RBC # FLD: 18.8 % — HIGH (ref 10.3–14.5)
RH IG SCN BLD-IMP: POSITIVE — SIGNIFICANT CHANGE UP
SODIUM SERPL-SCNC: 135 MMOL/L — SIGNIFICANT CHANGE UP (ref 135–145)
WBC # BLD: 8 K/UL — SIGNIFICANT CHANGE UP (ref 3.8–10.5)
WBC # FLD AUTO: 8 K/UL — SIGNIFICANT CHANGE UP (ref 3.8–10.5)

## 2017-07-30 RX ORDER — CHLORHEXIDINE GLUCONATE 213 G/1000ML
1 SOLUTION TOPICAL ONCE
Qty: 0 | Refills: 0 | Status: COMPLETED | OUTPATIENT
Start: 2017-07-30 | End: 2017-07-30

## 2017-07-30 RX ORDER — CEFUROXIME AXETIL 250 MG
1500 TABLET ORAL ONCE
Qty: 0 | Refills: 0 | Status: DISCONTINUED | OUTPATIENT
Start: 2017-07-30 | End: 2017-07-31

## 2017-07-30 RX ORDER — INSULIN GLARGINE 100 [IU]/ML
6 INJECTION, SOLUTION SUBCUTANEOUS AT BEDTIME
Qty: 0 | Refills: 0 | Status: COMPLETED | OUTPATIENT
Start: 2017-07-30 | End: 2017-07-30

## 2017-07-30 RX ORDER — CHLORHEXIDINE GLUCONATE 213 G/1000ML
15 SOLUTION TOPICAL ONCE
Qty: 0 | Refills: 0 | Status: COMPLETED | OUTPATIENT
Start: 2017-07-30 | End: 2017-07-31

## 2017-07-30 RX ORDER — POTASSIUM CHLORIDE 20 MEQ
40 PACKET (EA) ORAL ONCE
Qty: 0 | Refills: 0 | Status: COMPLETED | OUTPATIENT
Start: 2017-07-30 | End: 2017-07-30

## 2017-07-30 RX ADMIN — Medication 2: at 11:03

## 2017-07-30 RX ADMIN — Medication 25 MILLIGRAM(S): at 13:33

## 2017-07-30 RX ADMIN — Medication 25 MILLIGRAM(S): at 21:05

## 2017-07-30 RX ADMIN — MUPIROCIN 1 APPLICATION(S): 20 OINTMENT TOPICAL at 05:58

## 2017-07-30 RX ADMIN — Medication 25 MILLIGRAM(S): at 05:58

## 2017-07-30 RX ADMIN — Medication 9 UNIT(S): at 16:39

## 2017-07-30 RX ADMIN — HEPARIN SODIUM 12 UNIT(S)/HR: 5000 INJECTION INTRAVENOUS; SUBCUTANEOUS at 07:00

## 2017-07-30 RX ADMIN — Medication 9 UNIT(S): at 07:37

## 2017-07-30 RX ADMIN — CHLORHEXIDINE GLUCONATE 1 APPLICATION(S): 213 SOLUTION TOPICAL at 21:02

## 2017-07-30 RX ADMIN — Medication 9 UNIT(S): at 11:03

## 2017-07-30 RX ADMIN — MUPIROCIN 1 APPLICATION(S): 20 OINTMENT TOPICAL at 16:41

## 2017-07-30 RX ADMIN — PANTOPRAZOLE SODIUM 40 MILLIGRAM(S): 20 TABLET, DELAYED RELEASE ORAL at 05:58

## 2017-07-30 RX ADMIN — Medication 325 MILLIGRAM(S): at 11:00

## 2017-07-30 RX ADMIN — ATORVASTATIN CALCIUM 80 MILLIGRAM(S): 80 TABLET, FILM COATED ORAL at 21:05

## 2017-07-30 RX ADMIN — INSULIN GLARGINE 6 UNIT(S): 100 INJECTION, SOLUTION SUBCUTANEOUS at 21:30

## 2017-07-30 RX ADMIN — Medication 1: at 16:39

## 2017-07-30 RX ADMIN — Medication 81 MILLIGRAM(S): at 11:00

## 2017-07-30 RX ADMIN — Medication 40 MILLIEQUIVALENT(S): at 11:57

## 2017-07-30 RX ADMIN — Medication 20 MILLIEQUIVALENT(S): at 11:00

## 2017-07-30 RX ADMIN — Medication 40 MILLIGRAM(S): at 05:58

## 2017-07-30 RX ADMIN — HEPARIN SODIUM 12 UNIT(S)/HR: 5000 INJECTION INTRAVENOUS; SUBCUTANEOUS at 21:04

## 2017-07-30 RX ADMIN — HEPARIN SODIUM 12 UNIT(S)/HR: 5000 INJECTION INTRAVENOUS; SUBCUTANEOUS at 14:00

## 2017-07-30 NOTE — PROGRESS NOTE ADULT - ASSESSMENT
68 y/o M w/ PMH of DM, HTN, CAD, Anemia, MI (?2007) and CHF being transferred from [facility] with triple vessel disease. Pt was admitted to [place] for an acute CHF exacerbation and was diuresed with IV Lasix BID. During hospitalization in [facility], he had a stress test on 7/24/17 which showed a severe fixed defect involving the mid to apical gayatri-septal wall and EF of 34%. His admission was complicated by Vfib cardiac arrest and ROSC was achieved after 12 minutes after 3 doses of epinephrine and defibrillation. Patient was then transferred to CCU at that time after ROSC and intubation. Pt was extubated in CCU and echo showed EF 35-40%, severe hypokinesis of anterior/septal/apical walls with severe diastolic dysfunction. Patient was transfused 1 unit PRBC for anemia and was started on iron supplementation. Pt transferred to [facility] cath lab for angiogram 7/27/17. Patient now transferred to [facility] CCU for close monitoring. s/p LHC showing severe multi vessel CAD; pLAd 95% stenosis, mLAD 99% stenosis, OM1 99% stenosis, % stenosis. EF 35%. Patient transferred to [facility] for CTS service for CABG evaluation 7/27 with Dr. Roa   68 y/o M w/ PMH of DM, HTN, CAD, Anemia, MI (?2007) and CHF being transferred from [facility] for cardiac catheretization. Pt was admitted to [place] for an acute CHF exacerbation and was diuresed with IV Lasix BID. During hospitalization in [facility], he had a stress test on 7/24/17 which showed a severe fixed defect involving the mid to apical gayatri-septal wall and EF of 34%. His admission was complicated by Vfib cardiac arrest and ROSC was achieved after 12 minutes after 3 doses of epinephrine and defibrillation. Patient was then transferred to CCU at that time after ROSC and intubation. Pt was extubated in CCU and echo showed EF 35-40%, severe hypokinesis of anterior/septal/apical walls with severe diastolic dysfunction. Patient was transfused 1 unit PRBC for anemia and was started on iron supplementation. Pt transferred to [facility] cath lab for angiogram 7/27/17. Patient now transferred to [facility] CCU for close monitoring. s/p LHC showing severe multi vessel CAD; pLAd 95% stenosis, mLAD 99% stenosis, OM1 99% stenosis, % stenosis. EF 35%. Patient transferred to [facility] for CTS service for CABG evaluation.   Currently asymptomatic except for mild chest soreness 2/2 CPR. Denies SOB, chest pain, dizziness, n/v/d, fever, chills.    Preop workup in progress.   7/28 heparin initiated for CAD as per Dr. Roa  endo consult with Dr. Martinez for uncontrolled dm- aic 10.2 - 7/28 insulin gttp initiated- pt transferred to [facility] for gttp   increase b-blockers as tolerated   7/28 echo: . Mildly dilated left atrium.  LA volume index = 35 cc/m2.  2. Severe segmental left ventricular systolic dysfunction.  Akinesis of the mid to apical anteroseptum, apex,  inferolateral wall.   Endocardial visualization enhanced  with intravenous injection of echo contrast (Definity).  Severe hypokinesis of the remaining segments.  3. Severe  diastolic dysfunction (Stage III).  4. Decreased right ventricular systolic function.  5. Right pleural effusion.  7/29 carotids neg sig stenosis  OR planned for monday 7/31 with DR. Roa   heparin gtt to OR - IABP between 10a-12pm prior to OR - all set up .

## 2017-07-30 NOTE — PROGRESS NOTE ADULT - SUBJECTIVE AND OBJECTIVE BOX
Cardiac Surgery Pre-op Note:  CC: Patient is a 67y old  Male who presents with a chief complaint of I need heart surgery (28 Jul 2017 00:49)      Referring Physician:                                                                                             Surgeon: ROSANNE  Procedure: (Date) (Procedure) CABG    Allergies    No Known Allergies    Intolerances      HPI:  68 y/o M w/ PMH of DM, HTN, CAD, Anemia, MI (?2007) and CHF being transferred from [facility] with triple vessel disease. Pt was admitted to [place] for an acute CHF exacerbation and was diuresed with IV Lasix BID. During hospitalization in [facility], he had a stress test on 7/24/17 which showed a severe fixed defect involving the mid to apical gayatri-septal wall and EF of 34%. His admission was complicated by Vfib cardiac arrest and ROSC was achieved after 12 minutes after 3 doses of epinephrine and defibrillation. Patient was then transferred to CCU at that time after ROSC and intubation. Pt was extubated in CCU and echo showed EF 35-40%, severe hypokinesis of anterior/septal/apical walls with severe diastolic dysfunction. Patient was transfused 1 unit PRBC for anemia and was started on iron supplementation. Pt transferred to [facility] cath lab for angiogram 7/27/17. Patient now transferred to [facility] CCU for close monitoring. s/p LHC showing severe multi vessel CAD; pLAd 95% stenosis, mLAD 99% stenosis, OM1 99% stenosis, % stenosis. EF 35%. Patient transferred to [facility] for CTS service for CABG evaluation.   Currently asymptomatic except for mild chest soreness 2/2 CPR. Denies SOB, chest pain, dizziness, n/v/d, fever, chills. (28 Jul 2017 00:49)      PAST MEDICAL & SURGICAL HISTORY:  Anemia  DM (diabetes mellitus)  MI (myocardial infarction)  CAD (coronary artery disease)  CHF (congestive heart failure)  No significant past surgical history      MEDICATIONS  (STANDING):  aspirin enteric coated 81 milliGRAM(s) Oral daily  atorvastatin 80 milliGRAM(s) Oral at bedtime  ferrous    sulfate 325 milliGRAM(s) Oral daily  furosemide    Tablet 40 milliGRAM(s) Oral daily  potassium chloride    Tablet ER 20 milliEquivalent(s) Oral daily  pantoprazole    Tablet 40 milliGRAM(s) Oral before breakfast  insulin lispro (HumaLOG) corrective regimen sliding scale   SubCutaneous three times a day before meals  insulin lispro (HumaLOG) corrective regimen sliding scale   SubCutaneous at bedtime  heparin  Infusion 1000 Unit(s)/Hr (12 mL/Hr) IV Continuous <Continuous>  metoprolol 25 milliGRAM(s) Oral three times a day  insulin glargine Injectable (LANTUS) 12 Unit(s) SubCutaneous at bedtime  dextrose 50% Injectable 25 Gram(s) IV Push once  insulin Infusion 6 Unit(s)/Hr (6 mL/Hr) IV Continuous <Continuous>  mupirocin 2% Ointment 1 Application(s) Topical two times a day  insulin lispro Injectable (HumaLOG) 9 Unit(s) SubCutaneous three times a day before meals  chlorhexidine 4% Liquid 1 Application(s) Topical once  chlorhexidine 0.12% Liquid 15 milliLiter(s) Swish and Spit once  cefuroxime  IVPB 1500 milliGRAM(s) IV Intermittent once    MEDICATIONS  (PRN):  acetaminophen   Tablet. 650 milliGRAM(s) Oral every 6 hours PRN Mild Pain (1 - 3)  dextrose Gel 1 Dose(s) Oral once PRN Blood Glucose LESS THAN 70 milliGRAM(s)/deciLiter  oxyCODONE    5 mG/acetaminophen 325 mG 1 Tablet(s) Oral every 4 hours PRN rib pain    Labs:                   10.3   8.0   )-----------( 298      ( 30 Jul 2017 06:37 )             34.9     07-30    135  |  99  |  15  ----------------------------<  137<H>  3.5   |  24  |  0.98    Ca    7.9<L>      30 Jul 2017 06:37  Mg     1.6     07-30      PTT - ( 30 Jul 2017 06:37 )  PTT:91.3 sec    Blood Type: ABO Interpretation: AB (07-30 @ 06:41)    HGB A1C: Hemoglobin A1C, Whole Blood: 10.2 % (07-27 @ 16:58)    Prealbumin:   Pro-BNP: Serum Pro-Brain Natriuretic Peptide: 2447 pg/mL (07-28 @ 03:39)    Thyroid Panel: 07-29 @ 09:01/7.60  1.3/--/--  07-28 @ 07:36/4.46  --/--/--  07-27 @ 19:00/0.01  --/--/--    MRSA: MRSA PCR Result.: NotDetec (07-28 @ 07:36)   / MSSA: MSSA PCR Result.: Detected (07-28 @ 07:36  Carotid Duplex:      Echocardiogram:    Gen: WN/WD NAD  Neuro: AAOx3, nonfocal  Pulm: CTA B/L  CV: RRR, S1S2 +systolic murmur  Abd: Soft, NT, ND +BS  Ext: No edema, + peripheral pulses    Pt has AICD/PPM [ ] Yes  [x ] No             Brand Name:  Pre-op Beta Blocker ordered within 24 hrs of surgery (CABG ONLY)?  [x ] Yes  [ ] No  If not, Why?  Type & Cross  [ ] Yes  [ ] No  NPO after Midnight [x ] Yes  [ ] No  Pre-op ABX ordered, to be taped on chart:  [ x] Yes  [ ] No     Hibiclens/Peridex ordered [x ] Yes  [ ] No  Intraop on Hold: PRBCs, CXR, ANYA [x ]   Consent obtained  [x ] Yes  [ ] No

## 2017-07-30 NOTE — PROGRESS NOTE ADULT - SUBJECTIVE AND OBJECTIVE BOX
Cardiovascular Disease Progress Note    Overnight events: No acute events overnight. Mr. Lundberg feels well. No chest pain or SOB.   Otherwise review of systems negative  Objective Findings:  T(C): 36.6 (17 @ 07:15), Max: 36.9 (17 @ 18:59)  HR: 77 (17 @ 07:15) (72 - 90)  BP: 120/62 (17 @ 07:15) (100/58 - 128/75)  RR: 18 (17 @ 07:15) (18 - 18)  SpO2: 98% (17 @ 07:15) (98% - 100%)  Wt(kg): --  Daily     Daily Weight in k.5 (2017 08:04)      Physical Exam:  Gen: NAD  HEENT: EOMI  CV: RRR, normal S1 + S2, no m/r/g  Lungs: CTAB  Abd: soft, non-tender  Ext: No edema    Telemetry: Sinus 70-80s; no ectopy    Laboratory Data:                        10.3   8.0   )-----------( 298      ( 2017 06:37 )             34.9     07-30    135  |  99  |  15  ----------------------------<  137<H>  3.5   |  24  |  0.98    Ca    7.9<L>      2017 06:37      PTT - ( 2017 06:37 )  PTT:91.3 sec          Inpatient Medications:  MEDICATIONS  (STANDING):  aspirin enteric coated 81 milliGRAM(s) Oral daily  atorvastatin 80 milliGRAM(s) Oral at bedtime  ferrous    sulfate 325 milliGRAM(s) Oral daily  furosemide    Tablet 40 milliGRAM(s) Oral daily  potassium chloride    Tablet ER 20 milliEquivalent(s) Oral daily  pantoprazole    Tablet 40 milliGRAM(s) Oral before breakfast  insulin lispro (HumaLOG) corrective regimen sliding scale   SubCutaneous three times a day before meals  insulin lispro (HumaLOG) corrective regimen sliding scale   SubCutaneous at bedtime  heparin  Infusion 1000 Unit(s)/Hr (12 mL/Hr) IV Continuous <Continuous>  metoprolol 25 milliGRAM(s) Oral three times a day  insulin glargine Injectable (LANTUS) 12 Unit(s) SubCutaneous at bedtime  dextrose 50% Injectable 25 Gram(s) IV Push once  insulin Infusion 6 Unit(s)/Hr (6 mL/Hr) IV Continuous <Continuous>  mupirocin 2% Ointment 1 Application(s) Topical two times a day  insulin lispro Injectable (HumaLOG) 9 Unit(s) SubCutaneous three times a day before meals      Assessment: 67 year old man with uncontrolled DM and multivessel CAD now presents for CABG    #Multivessel CAD- currently chest pain free with no signs or symptoms of active ischemia. Plan for CABG with Dr. Roa tentatively scheduled for . Plavix and ARB held in anticipation of surgery. Continue ASA, heparing gtt, beta blocker and high intensity statin. TTE and carotid duplex results noted.    #Compensated systolic CHF- euvolemic on exam and laying flat. Continue current dose Lasix. ARB held in anticipation of Sx.     #HTN- BP well controlled on current regimen    #Uncontrolled DM II- insulin gtt. Endo input appreciated    Rest of care as per CT surgery team.       Over 35 minutes spent on total encounter; more than 50% of the visit was spent counseling and/or coordinating care by the attending physician.      Brayan Devlin M.D.   Cardiovascular Disease  [phone]

## 2017-07-30 NOTE — PROGRESS NOTE ADULT - ASSESSMENT
Assessment  DMT2: 67y Male with DM T2 with hyperglycemia  blood sugars improving, eating meals,  compliant with low carb diet.  HTN: On meds, controlled  HLD:  on statin, tolerating.  CAD: On treatment, planing surgery.

## 2017-07-30 NOTE — PROGRESS NOTE ADULT - SUBJECTIVE AND OBJECTIVE BOX
Chief complaint  Patient is a 67y old  Male who presents with a chief complaint of I need heart surgery (28 Jul 2017 00:49)   Review of systems  Patient in bed, comfortable, no fever, no hypoglycemia.    Labs and Fingersticks    CAPILLARY BLOOD GLUCOSE  135 (30 Jul 2017 21:43)  190 (30 Jul 2017 16:25)  132 (30 Jul 2017 13:00)  209 (30 Jul 2017 11:06)  172 (30 Jul 2017 09:34)  144 (30 Jul 2017 07:38)  144 (30 Jul 2017 07:15)  134 (30 Jul 2017 05:52)  119 (30 Jul 2017 04:22)  105 (30 Jul 2017 02:32)  95 (30 Jul 2017 01:31)  86 (30 Jul 2017 00:26)  83 (29 Jul 2017 23:46)  82 (29 Jul 2017 23:33)  71 (29 Jul 2017 23:15)  75 (29 Jul 2017 22:52)  71 (29 Jul 2017 22:35)  56 (29 Jul 2017 22:20)  67 (29 Jul 2017 22:00)  134 (29 Jul 2017 21:04)  117 (29 Jul 2017 20:00)  272 (29 Jul 2017 18:59)  342 (29 Jul 2017 16:21)  243 (29 Jul 2017 11:07)  148 (29 Jul 2017 06:02)  128 (29 Jul 2017 04:14)  128 (29 Jul 2017 02:10)  126 (29 Jul 2017 00:24)  155 (28 Jul 2017 23:32)  105 (28 Jul 2017 22:22)  80 (28 Jul 2017 21:48)  77 (28 Jul 2017 21:15)  88 (28 Jul 2017 20:47)  148 (28 Jul 2017 19:47)  188 (28 Jul 2017 18:47)  186 (28 Jul 2017 17:45)  242 (28 Jul 2017 16:49)  306 (28 Jul 2017 15:37)  321 (28 Jul 2017 13:26)  307 (28 Jul 2017 11:58)  232 (28 Jul 2017 07:47)  285 (28 Jul 2017 01:10)    Anion Gap, Serum: 12 (07-30 @ 06:37)  Anion Gap, Serum: 12 (07-29 @ 00:28)      Calcium, Total Serum: 7.9 <L> (07-30 @ 06:37)  Calcium, Total Serum: 7.9 <L> (07-29 @ 00:28)          07-30    135  |  99  |  15  ----------------------------<  137<H>  3.5   |  24  |  0.98    Ca    7.9<L>      30 Jul 2017 06:37  Mg     1.6     07-30                          10.3   8.0   )-----------( 298      ( 30 Jul 2017 06:37 )             34.9     Medications  MEDICATIONS  (STANDING):  aspirin enteric coated 81 milliGRAM(s) Oral daily  atorvastatin 80 milliGRAM(s) Oral at bedtime  ferrous    sulfate 325 milliGRAM(s) Oral daily  furosemide    Tablet 40 milliGRAM(s) Oral daily  potassium chloride    Tablet ER 20 milliEquivalent(s) Oral daily  pantoprazole    Tablet 40 milliGRAM(s) Oral before breakfast  insulin lispro (HumaLOG) corrective regimen sliding scale   SubCutaneous three times a day before meals  insulin lispro (HumaLOG) corrective regimen sliding scale   SubCutaneous at bedtime  heparin  Infusion 1000 Unit(s)/Hr (12 mL/Hr) IV Continuous <Continuous>  metoprolol 25 milliGRAM(s) Oral three times a day  dextrose 50% Injectable 25 Gram(s) IV Push once  insulin Infusion 6 Unit(s)/Hr (6 mL/Hr) IV Continuous <Continuous>  mupirocin 2% Ointment 1 Application(s) Topical two times a day  insulin lispro Injectable (HumaLOG) 9 Unit(s) SubCutaneous three times a day before meals  chlorhexidine 0.12% Liquid 15 milliLiter(s) Swish and Spit once  cefuroxime  IVPB 1500 milliGRAM(s) IV Intermittent once  insulin glargine Injectable (LANTUS) 6 Unit(s) SubCutaneous at bedtime      Physical Exam  General: Patient comfortable in bed  Vital Signs Last 12 Hrs  T(F): 98.2 (07-30-17 @ 21:00), Max: 98.2 (07-30-17 @ 21:00)  HR: 80 (07-30-17 @ 21:00) (72 - 80)  BP: 115/66 (07-30-17 @ 21:00) (99/56 - 115/66)  BP(mean): --  RR: 18 (07-30-17 @ 21:00) (18 - 18)  SpO2: 100% (07-30-17 @ 21:00) (99% - 100%)  Neck: No palpable thyroid nodules.  CVS: S1S2, No murmurs  Respiratory: No wheezing, no crepitations  GI: Abdomen soft, bowel sounds positive  Musculoskeletal: Positive edema lower extremities bilaterally  Skin: No skin rashes, no ecchimosis    Diagnostics    Thyroid Stimulating Hormone, Serum: AM Sched. Collection: 29-Jul-2017 06:00 (07-28 @ 11:45)  Free Thyroxine, Serum: AM Sched. Collection: 29-Jul-2017 06:00 (07-28 @ 11:45)

## 2017-07-30 NOTE — PROGRESS NOTE ADULT - PROBLEM SELECTOR PLAN 2
preop eval and workup for cabg  ck echo/ carotids/ pft's/ vein mapping done  or monday 7/31 with dR. Roa   IABP BETWEEN 10-12PM - HEPARIN GTT TO OR  heparin gttp   asa/ bblockers/ stain  OR monday with DR. Roa

## 2017-07-31 ENCOUNTER — APPOINTMENT (OUTPATIENT)
Dept: CARDIOTHORACIC SURGERY | Facility: HOSPITAL | Age: 67
End: 2017-07-31
Payer: COMMERCIAL

## 2017-07-31 LAB
ANION GAP SERPL CALC-SCNC: 17 MMOL/L — SIGNIFICANT CHANGE UP (ref 5–17)
APTT BLD: 79.5 SEC — HIGH (ref 27.5–37.4)
APTT BLD: 80.9 SEC — HIGH (ref 27.5–37.4)
BASE EXCESS BLDMV CALC-SCNC: 1.3 MMOL/L — SIGNIFICANT CHANGE UP (ref -3–3)
BASE EXCESS BLDMV CALC-SCNC: 2.9 MMOL/L — SIGNIFICANT CHANGE UP (ref -3–3)
BASE EXCESS BLDV CALC-SCNC: 1.7 MMOL/L — SIGNIFICANT CHANGE UP (ref -2–2)
BUN SERPL-MCNC: 14 MG/DL — SIGNIFICANT CHANGE UP (ref 7–23)
CA-I SERPL-SCNC: 0.77 MMOL/L — LOW (ref 1.12–1.3)
CALCIUM SERPL-MCNC: 8.8 MG/DL — SIGNIFICANT CHANGE UP (ref 8.4–10.5)
CHLORIDE BLDV-SCNC: SIGNIFICANT CHANGE UP MMOL/L (ref 96–108)
CHLORIDE SERPL-SCNC: 103 MMOL/L — SIGNIFICANT CHANGE UP (ref 96–108)
CK MB BLD-MCNC: 7.6 % — HIGH (ref 0–3.5)
CK MB CFR SERPL CALC: 19.3 NG/ML — HIGH (ref 0–6.7)
CK SERPL-CCNC: 254 U/L — HIGH (ref 30–200)
CO2 BLDMV-SCNC: 27 MMOL/L — SIGNIFICANT CHANGE UP (ref 21–29)
CO2 BLDMV-SCNC: 29 MMOL/L — SIGNIFICANT CHANGE UP (ref 21–29)
CO2 SERPL-SCNC: 23 MMOL/L — SIGNIFICANT CHANGE UP (ref 22–31)
CREAT SERPL-MCNC: 0.92 MG/DL — SIGNIFICANT CHANGE UP (ref 0.5–1.3)
FIBRINOGEN PPP-MCNC: 403 MG/DL — SIGNIFICANT CHANGE UP (ref 310–510)
GAS PNL BLDA: SIGNIFICANT CHANGE UP
GAS PNL BLDMV: SIGNIFICANT CHANGE UP
GAS PNL BLDMV: SIGNIFICANT CHANGE UP
GAS PNL BLDV: 137 MMOL/L — SIGNIFICANT CHANGE UP (ref 136–145)
GAS PNL BLDV: SIGNIFICANT CHANGE UP
GAS PNL BLDV: SIGNIFICANT CHANGE UP
GLUCOSE BLDV-MCNC: 128 MG/DL — HIGH (ref 70–99)
GLUCOSE SERPL-MCNC: 120 MG/DL — HIGH (ref 70–99)
HCO3 BLDMV-SCNC: 26 MMOL/L — SIGNIFICANT CHANGE UP (ref 20–28)
HCO3 BLDMV-SCNC: 27 MMOL/L — SIGNIFICANT CHANGE UP (ref 20–28)
HCO3 BLDV-SCNC: 27 MMOL/L — SIGNIFICANT CHANGE UP (ref 21–29)
HCT VFR BLD CALC: 30 % — LOW (ref 39–50)
HCT VFR BLDA CALC: 22 % — CRITICAL LOW (ref 39–50)
HGB BLD CALC-MCNC: 7 G/DL — CRITICAL LOW (ref 13–17)
HGB BLD-MCNC: 9.7 G/DL — LOW (ref 13–17)
HOROWITZ INDEX BLDMV+IHG-RTO: 100 — SIGNIFICANT CHANGE UP
HOROWITZ INDEX BLDMV+IHG-RTO: 70 — SIGNIFICANT CHANGE UP
HOROWITZ INDEX BLDV+IHG-RTO: 0 — SIGNIFICANT CHANGE UP
INR BLD: 1.22 RATIO — HIGH (ref 0.88–1.16)
LACTATE BLDV-MCNC: 1.2 MMOL/L — SIGNIFICANT CHANGE UP (ref 0.7–2)
MCHC RBC-ENTMCNC: 26.9 PG — LOW (ref 27–34)
MCHC RBC-ENTMCNC: 32.4 GM/DL — SIGNIFICANT CHANGE UP (ref 32–36)
MCV RBC AUTO: 83 FL — SIGNIFICANT CHANGE UP (ref 80–100)
O2 CT VFR BLD CALC: 34 MMHG — SIGNIFICANT CHANGE UP (ref 30–65)
O2 CT VFR BLD CALC: 38 MMHG — SIGNIFICANT CHANGE UP (ref 30–65)
PCO2 BLDMV: 41 MMHG — SIGNIFICANT CHANGE UP (ref 30–65)
PCO2 BLDMV: 44 MMHG — SIGNIFICANT CHANGE UP (ref 30–65)
PCO2 BLDV: 50 MMHG — SIGNIFICANT CHANGE UP (ref 35–50)
PH BLDMV: 7.41 — SIGNIFICANT CHANGE UP (ref 7.32–7.45)
PH BLDMV: 7.41 — SIGNIFICANT CHANGE UP (ref 7.32–7.45)
PH BLDV: 7.35 — SIGNIFICANT CHANGE UP (ref 7.35–7.45)
PLATELET # BLD AUTO: 204 K/UL — SIGNIFICANT CHANGE UP (ref 150–400)
PO2 BLDV: 46 MMHG — HIGH (ref 25–45)
POTASSIUM BLDV-SCNC: 3.9 MMOL/L — SIGNIFICANT CHANGE UP (ref 3.5–5)
POTASSIUM SERPL-MCNC: 3.9 MMOL/L — SIGNIFICANT CHANGE UP (ref 3.5–5.3)
POTASSIUM SERPL-SCNC: 3.9 MMOL/L — SIGNIFICANT CHANGE UP (ref 3.5–5.3)
PROTHROM AB SERPL-ACNC: 13.2 SEC — HIGH (ref 9.8–12.7)
RBC # BLD: 3.62 M/UL — LOW (ref 4.2–5.8)
RBC # FLD: 18 % — HIGH (ref 10.3–14.5)
SAO2 % BLDMV: 59 % — LOW (ref 60–90)
SAO2 % BLDMV: 67 % — SIGNIFICANT CHANGE UP (ref 60–90)
SAO2 % BLDV: 78 % — SIGNIFICANT CHANGE UP (ref 67–88)
SODIUM SERPL-SCNC: 143 MMOL/L — SIGNIFICANT CHANGE UP (ref 135–145)
TROPONIN T SERPL-MCNC: 0.5 NG/ML — HIGH (ref 0–0.06)
WBC # BLD: 9 K/UL — SIGNIFICANT CHANGE UP (ref 3.8–10.5)
WBC # FLD AUTO: 9 K/UL — SIGNIFICANT CHANGE UP (ref 3.8–10.5)

## 2017-07-31 PROCEDURE — 33508 ENDOSCOPIC VEIN HARVEST: CPT | Mod: AS

## 2017-07-31 PROCEDURE — 33517 CABG ARTERY-VEIN SINGLE: CPT

## 2017-07-31 PROCEDURE — 33533 CABG ARTERIAL SINGLE: CPT | Mod: AS

## 2017-07-31 PROCEDURE — 33508 ENDOSCOPIC VEIN HARVEST: CPT

## 2017-07-31 PROCEDURE — 33533 CABG ARTERIAL SINGLE: CPT

## 2017-07-31 PROCEDURE — 71010: CPT | Mod: 26

## 2017-07-31 PROCEDURE — 33517 CABG ARTERY-VEIN SINGLE: CPT | Mod: AS

## 2017-07-31 RX ORDER — SODIUM CHLORIDE 9 MG/ML
1000 INJECTION, SOLUTION INTRAVENOUS
Qty: 0 | Refills: 0 | Status: DISCONTINUED | OUTPATIENT
Start: 2017-07-31 | End: 2017-08-01

## 2017-07-31 RX ORDER — ASPIRIN/CALCIUM CARB/MAGNESIUM 324 MG
300 TABLET ORAL ONCE
Qty: 0 | Refills: 0 | Status: COMPLETED | OUTPATIENT
Start: 2017-07-31 | End: 2017-08-01

## 2017-07-31 RX ORDER — MAGNESIUM SULFATE 500 MG/ML
2 VIAL (ML) INJECTION ONCE
Qty: 0 | Refills: 0 | Status: COMPLETED | OUTPATIENT
Start: 2017-07-31 | End: 2017-07-31

## 2017-07-31 RX ORDER — VASOPRESSIN 20 [USP'U]/ML
0.03 INJECTION INTRAVENOUS
Qty: 100 | Refills: 0 | Status: DISCONTINUED | OUTPATIENT
Start: 2017-07-31 | End: 2017-08-01

## 2017-07-31 RX ORDER — POTASSIUM CHLORIDE 20 MEQ
10 PACKET (EA) ORAL
Qty: 0 | Refills: 0 | Status: COMPLETED | OUTPATIENT
Start: 2017-07-31 | End: 2017-07-31

## 2017-07-31 RX ORDER — PANTOPRAZOLE SODIUM 20 MG/1
40 TABLET, DELAYED RELEASE ORAL DAILY
Qty: 0 | Refills: 0 | Status: DISCONTINUED | OUTPATIENT
Start: 2017-07-31 | End: 2017-08-01

## 2017-07-31 RX ORDER — POTASSIUM CHLORIDE 20 MEQ
10 PACKET (EA) ORAL
Qty: 0 | Refills: 0 | Status: DISCONTINUED | OUTPATIENT
Start: 2017-07-31 | End: 2017-08-01

## 2017-07-31 RX ORDER — PROTAMINE SULFATE 10 MG/ML
50 AMPUL (ML) INTRAVENOUS ONCE
Qty: 0 | Refills: 0 | Status: COMPLETED | OUTPATIENT
Start: 2017-07-31 | End: 2017-07-31

## 2017-07-31 RX ORDER — POTASSIUM CHLORIDE 20 MEQ
10 PACKET (EA) ORAL ONCE
Qty: 0 | Refills: 0 | Status: DISCONTINUED | OUTPATIENT
Start: 2017-07-31 | End: 2017-08-01

## 2017-07-31 RX ORDER — INSULIN HUMAN 100 [IU]/ML
2 INJECTION, SOLUTION SUBCUTANEOUS
Qty: 100 | Refills: 0 | Status: DISCONTINUED | OUTPATIENT
Start: 2017-07-31 | End: 2017-08-01

## 2017-07-31 RX ORDER — CEFUROXIME AXETIL 250 MG
1500 TABLET ORAL EVERY 8 HOURS
Qty: 0 | Refills: 0 | Status: DISCONTINUED | OUTPATIENT
Start: 2017-07-31 | End: 2017-08-01

## 2017-07-31 RX ORDER — AMINOCAPROIC ACID 500 MG/1
5 TABLET ORAL
Qty: 5 | Refills: 0 | Status: COMPLETED | OUTPATIENT
Start: 2017-07-31 | End: 2017-07-31

## 2017-07-31 RX ORDER — DOCUSATE SODIUM 100 MG
100 CAPSULE ORAL THREE TIMES A DAY
Qty: 0 | Refills: 0 | Status: DISCONTINUED | OUTPATIENT
Start: 2017-07-31 | End: 2017-08-01

## 2017-07-31 RX ORDER — OXYCODONE AND ACETAMINOPHEN 5; 325 MG/1; MG/1
1 TABLET ORAL EVERY 4 HOURS
Qty: 0 | Refills: 0 | Status: DISCONTINUED | OUTPATIENT
Start: 2017-07-31 | End: 2017-08-01

## 2017-07-31 RX ORDER — NOREPINEPHRINE BITARTRATE/D5W 8 MG/250ML
0.03 PLASTIC BAG, INJECTION (ML) INTRAVENOUS
Qty: 8 | Refills: 0 | Status: DISCONTINUED | OUTPATIENT
Start: 2017-07-31 | End: 2017-08-01

## 2017-07-31 RX ORDER — OXYCODONE AND ACETAMINOPHEN 5; 325 MG/1; MG/1
2 TABLET ORAL EVERY 6 HOURS
Qty: 0 | Refills: 0 | Status: DISCONTINUED | OUTPATIENT
Start: 2017-07-31 | End: 2017-08-01

## 2017-07-31 RX ORDER — DOBUTAMINE HCL 250MG/20ML
2.5 VIAL (ML) INTRAVENOUS
Qty: 500 | Refills: 0 | Status: DISCONTINUED | OUTPATIENT
Start: 2017-07-31 | End: 2017-08-01

## 2017-07-31 RX ORDER — METOCLOPRAMIDE HCL 10 MG
10 TABLET ORAL EVERY 8 HOURS
Qty: 0 | Refills: 0 | Status: DISCONTINUED | OUTPATIENT
Start: 2017-07-31 | End: 2017-08-01

## 2017-07-31 RX ADMIN — Medication 10 MILLIGRAM(S): at 23:32

## 2017-07-31 RX ADMIN — Medication 40 MILLIGRAM(S): at 05:21

## 2017-07-31 RX ADMIN — INSULIN HUMAN 2 UNIT(S)/HR: 100 INJECTION, SOLUTION SUBCUTANEOUS at 23:29

## 2017-07-31 RX ADMIN — VASOPRESSIN 2 UNIT(S)/MIN: 20 INJECTION INTRAVENOUS at 23:28

## 2017-07-31 RX ADMIN — Medication 100 MILLIEQUIVALENT(S): at 22:30

## 2017-07-31 RX ADMIN — CHLORHEXIDINE GLUCONATE 15 MILLILITER(S): 213 SOLUTION TOPICAL at 11:46

## 2017-07-31 RX ADMIN — Medication 1: at 08:38

## 2017-07-31 RX ADMIN — Medication 2: at 11:45

## 2017-07-31 RX ADMIN — Medication 25 MILLIGRAM(S): at 05:21

## 2017-07-31 RX ADMIN — Medication 50 GRAM(S): at 23:00

## 2017-07-31 RX ADMIN — Medication 220 MILLIGRAM(S): at 23:20

## 2017-07-31 RX ADMIN — Medication 100 MILLIEQUIVALENT(S): at 23:00

## 2017-07-31 RX ADMIN — AMINOCAPROIC ACID 250 GM/HR: 500 TABLET ORAL at 23:28

## 2017-07-31 RX ADMIN — MUPIROCIN 1 APPLICATION(S): 20 OINTMENT TOPICAL at 05:22

## 2017-07-31 RX ADMIN — Medication 6.76 MICROGRAM(S)/KG/MIN: at 23:29

## 2017-07-31 RX ADMIN — Medication 100 MILLIEQUIVALENT(S): at 21:45

## 2017-07-31 RX ADMIN — HEPARIN SODIUM 12 UNIT(S)/HR: 5000 INJECTION INTRAVENOUS; SUBCUTANEOUS at 03:30

## 2017-07-31 RX ADMIN — Medication 5 MICROGRAM(S)/KG/MIN: at 23:28

## 2017-07-31 RX ADMIN — PANTOPRAZOLE SODIUM 40 MILLIGRAM(S): 20 TABLET, DELAYED RELEASE ORAL at 05:21

## 2017-07-31 NOTE — PROGRESS NOTE ADULT - SUBJECTIVE AND OBJECTIVE BOX
Cardiovascular Disease Progress Note    Overnight events: No acute events overnight. No chest pain or SOB.  Otherwise review of systems negative  Objective Findings:  T(C): 36.7 (07-31-17 @ 07:33), Max: 36.9 (07-31-17 @ 05:25)  HR: 77 (07-31-17 @ 07:33) (72 - 107)  BP: 106/63 (07-31-17 @ 07:33) (99/56 - 118/64)  RR: 18 (07-31-17 @ 07:33) (18 - 18)  SpO2: 100% (07-31-17 @ 07:33) (98% - 100%)  Wt(kg): --  Daily     Daily       Physical Exam:  Gen: NAD  HEENT: EOMI  CV: RRR, normal S1 + S2, no m/r/g  Lungs: CTAB  Abd: soft, non-tender  Ext: No edema    Telemetry: Sinus 70-80s; Occasional PVDs    Laboratory Data:                        10.3   8.0   )-----------( 298      ( 30 Jul 2017 06:37 )             34.9     07-30    135  |  99  |  15  ----------------------------<  137<H>  3.5   |  24  |  0.98    Ca    7.9<L>      30 Jul 2017 06:37  Mg     1.6     07-30      PTT - ( 31 Jul 2017 03:21 )  PTT:80.9 sec          Inpatient Medications:  MEDICATIONS  (STANDING):  aspirin enteric coated 81 milliGRAM(s) Oral daily  atorvastatin 80 milliGRAM(s) Oral at bedtime  ferrous    sulfate 325 milliGRAM(s) Oral daily  furosemide    Tablet 40 milliGRAM(s) Oral daily  potassium chloride    Tablet ER 20 milliEquivalent(s) Oral daily  pantoprazole    Tablet 40 milliGRAM(s) Oral before breakfast  insulin lispro (HumaLOG) corrective regimen sliding scale   SubCutaneous three times a day before meals  insulin lispro (HumaLOG) corrective regimen sliding scale   SubCutaneous at bedtime  heparin  Infusion 1000 Unit(s)/Hr (12 mL/Hr) IV Continuous <Continuous>  metoprolol 25 milliGRAM(s) Oral three times a day  dextrose 50% Injectable 25 Gram(s) IV Push once  insulin Infusion 6 Unit(s)/Hr (6 mL/Hr) IV Continuous <Continuous>  mupirocin 2% Ointment 1 Application(s) Topical two times a day  insulin lispro Injectable (HumaLOG) 9 Unit(s) SubCutaneous three times a day before meals  chlorhexidine 0.12% Liquid 15 milliLiter(s) Swish and Spit once  cefuroxime  IVPB 1500 milliGRAM(s) IV Intermittent once      Assessment: 67 year old man with uncontrolled DM and multivessel CAD now presents for CABG    #Multivessel CAD- currently chest pain free with no signs or symptoms of active ischemia. Plan for CABG with Dr. Roa today with placement of IABP first. Plavix and ARB held in anticipation of surgery. Continue ASA, heparin gtt, beta blocker and high intensity statin. TTE and carotid duplex results noted.    #Compensated systolic CHF- euvolemic on exam and laying flat. Continue current dose Lasix. ARB held in anticipation of Sx.     #HTN- BP well controlled on current regimen    #Uncontrolled DM II- insulin gtt. Endo input appreciated    Rest of care as per CT surgery team.       Over 35 minutes spent on total encounter; more than 50% of the visit was spent counseling and/or coordinating care by the attending physician.      Brayan Devlin M.D.   Cardiovascular Disease  [phone]

## 2017-07-31 NOTE — PRE-OP CHECKLIST - SELECT TESTS ORDERED
INR/EKG/CBC/Sickle Cell (black patients 3mos-10yr)/Spirometry/PT/PTT/CXR/BMP/Type and Cross/Urinalysis

## 2017-07-31 NOTE — PROGRESS NOTE ADULT - SUBJECTIVE AND OBJECTIVE BOX
Chief complaint  Patient is a 67y old  Male who presents with a chief complaint of I need heart surgery (28 Jul 2017 00:49)   Review of systems  Patient in bed, comfortable, no fever, no hypoglycemia.    Labs and Fingersticks    CAPILLARY BLOOD GLUCOSE  229 (31 Jul 2017 11:41)  187 (31 Jul 2017 07:33)  229 (31 Jul 2017 01:19)  135 (30 Jul 2017 21:43)  190 (30 Jul 2017 16:25)  132 (30 Jul 2017 13:00)  209 (30 Jul 2017 11:06)  172 (30 Jul 2017 09:34)  144 (30 Jul 2017 07:38)  144 (30 Jul 2017 07:15)  134 (30 Jul 2017 05:52)  119 (30 Jul 2017 04:22)  105 (30 Jul 2017 02:32)  95 (30 Jul 2017 01:31)  86 (30 Jul 2017 00:26)  83 (29 Jul 2017 23:46)  82 (29 Jul 2017 23:33)  71 (29 Jul 2017 23:15)  75 (29 Jul 2017 22:52)  71 (29 Jul 2017 22:35)  56 (29 Jul 2017 22:20)  67 (29 Jul 2017 22:00)  134 (29 Jul 2017 21:04)  117 (29 Jul 2017 20:00)  272 (29 Jul 2017 18:59)  342 (29 Jul 2017 16:21)  243 (29 Jul 2017 11:07)  148 (29 Jul 2017 06:02)  128 (29 Jul 2017 04:14)  128 (29 Jul 2017 02:10)  126 (29 Jul 2017 00:24)  155 (28 Jul 2017 23:32)  105 (28 Jul 2017 22:22)  80 (28 Jul 2017 21:48)  77 (28 Jul 2017 21:15)  88 (28 Jul 2017 20:47)  148 (28 Jul 2017 19:47)  188 (28 Jul 2017 18:47)    Anion Gap, Serum: 12 (07-30 @ 06:37)      Calcium, Total Serum: 7.9 <L> (07-30 @ 06:37)          07-30    135  |  99  |  15  ----------------------------<  137<H>  3.5   |  24  |  0.98    Ca    7.9<L>      30 Jul 2017 06:37  Mg     1.6     07-30                          10.3   8.0   )-----------( 298      ( 30 Jul 2017 06:37 )             34.9     Medications  MEDICATIONS  (STANDING):      Physical Exam  General: Patient comfortable in bed  Vital Signs Last 12 Hrs  T(F): 98.2 (07-31-17 @ 11:41), Max: 98.2 (07-31-17 @ 11:41)  HR: 78 (07-31-17 @ 11:41) (77 - 78)  BP: 111/62 (07-31-17 @ 11:41) (106/63 - 111/62)  BP(mean): --  RR: 18 (07-31-17 @ 11:41) (18 - 18)  SpO2: 100% (07-31-17 @ 11:41) (100% - 100%)  Neck: No palpable thyroid nodules.  CVS: S1S2, No murmurs  Respiratory: No wheezing, no crepitations  GI: Abdomen soft, bowel sounds positive  Musculoskeletal: Positive edema lower extremities bilaterally  Skin: No skin rashes, no ecchimosis    Diagnostics    Thyroid Stimulating Hormone, Serum: AM Sched. Collection: 29-Jul-2017 06:00 (07-28 @ 11:45)  Free Thyroxine, Serum: AM Sched. Collection: 29-Jul-2017 06:00 (07-28 @ 11:45)

## 2017-08-01 ENCOUNTER — APPOINTMENT (OUTPATIENT)
Dept: CARDIOTHORACIC SURGERY | Facility: HOSPITAL | Age: 67
End: 2017-08-01

## 2017-08-01 LAB
ALBUMIN SERPL ELPH-MCNC: 2.6 G/DL — LOW (ref 3.3–5)
ALBUMIN SERPL ELPH-MCNC: 2.9 G/DL — LOW (ref 3.3–5)
ALP SERPL-CCNC: 44 U/L — SIGNIFICANT CHANGE UP (ref 40–120)
ALP SERPL-CCNC: 47 U/L — SIGNIFICANT CHANGE UP (ref 40–120)
ALT FLD-CCNC: 13 U/L RC — SIGNIFICANT CHANGE UP (ref 10–45)
ALT FLD-CCNC: 14 U/L RC — SIGNIFICANT CHANGE UP (ref 10–45)
ANION GAP SERPL CALC-SCNC: 15 MMOL/L — SIGNIFICANT CHANGE UP (ref 5–17)
ANION GAP SERPL CALC-SCNC: 15 MMOL/L — SIGNIFICANT CHANGE UP (ref 5–17)
APTT BLD: 27.3 SEC — LOW (ref 27.5–37.4)
APTT BLD: 30.4 SEC — SIGNIFICANT CHANGE UP (ref 27.5–37.4)
AST SERPL-CCNC: 34 U/L — SIGNIFICANT CHANGE UP (ref 10–40)
AST SERPL-CCNC: 36 U/L — SIGNIFICANT CHANGE UP (ref 10–40)
BASE EXCESS BLDA CALC-SCNC: 1.4 MMOL/L — SIGNIFICANT CHANGE UP (ref -2–2)
BASE EXCESS BLDMV CALC-SCNC: 1.3 MMOL/L — SIGNIFICANT CHANGE UP (ref -3–3)
BASE EXCESS BLDMV CALC-SCNC: 1.7 MMOL/L — SIGNIFICANT CHANGE UP (ref -3–3)
BASE EXCESS BLDMV CALC-SCNC: 2.8 MMOL/L — SIGNIFICANT CHANGE UP (ref -3–3)
BASE EXCESS BLDMV CALC-SCNC: 3.5 MMOL/L — HIGH (ref -3–3)
BASE EXCESS BLDMV CALC-SCNC: 3.5 MMOL/L — HIGH (ref -3–3)
BASOPHILS # BLD AUTO: 0 K/UL — SIGNIFICANT CHANGE UP (ref 0–0.2)
BASOPHILS NFR BLD AUTO: 0.1 % — SIGNIFICANT CHANGE UP (ref 0–2)
BILIRUB SERPL-MCNC: 1 MG/DL — SIGNIFICANT CHANGE UP (ref 0.2–1.2)
BILIRUB SERPL-MCNC: 1 MG/DL — SIGNIFICANT CHANGE UP (ref 0.2–1.2)
BUN SERPL-MCNC: 15 MG/DL — SIGNIFICANT CHANGE UP (ref 7–23)
BUN SERPL-MCNC: 16 MG/DL — SIGNIFICANT CHANGE UP (ref 7–23)
CALCIUM SERPL-MCNC: 8 MG/DL — LOW (ref 8.4–10.5)
CALCIUM SERPL-MCNC: 8.6 MG/DL — SIGNIFICANT CHANGE UP (ref 8.4–10.5)
CHLORIDE SERPL-SCNC: 101 MMOL/L — SIGNIFICANT CHANGE UP (ref 96–108)
CHLORIDE SERPL-SCNC: 103 MMOL/L — SIGNIFICANT CHANGE UP (ref 96–108)
CO2 BLDA-SCNC: 26 MMOL/L — SIGNIFICANT CHANGE UP (ref 22–30)
CO2 BLDMV-SCNC: 27 MMOL/L — SIGNIFICANT CHANGE UP (ref 21–29)
CO2 BLDMV-SCNC: 27 MMOL/L — SIGNIFICANT CHANGE UP (ref 21–29)
CO2 BLDMV-SCNC: 28 MMOL/L — SIGNIFICANT CHANGE UP (ref 21–29)
CO2 BLDMV-SCNC: 28 MMOL/L — SIGNIFICANT CHANGE UP (ref 21–29)
CO2 BLDMV-SCNC: 29 MMOL/L — SIGNIFICANT CHANGE UP (ref 21–29)
CO2 SERPL-SCNC: 22 MMOL/L — SIGNIFICANT CHANGE UP (ref 22–31)
CO2 SERPL-SCNC: 23 MMOL/L — SIGNIFICANT CHANGE UP (ref 22–31)
CREAT SERPL-MCNC: 1.05 MG/DL — SIGNIFICANT CHANGE UP (ref 0.5–1.3)
CREAT SERPL-MCNC: 1.08 MG/DL — SIGNIFICANT CHANGE UP (ref 0.5–1.3)
EOSINOPHIL # BLD AUTO: 0 K/UL — SIGNIFICANT CHANGE UP (ref 0–0.5)
EOSINOPHIL NFR BLD AUTO: 0.5 % — SIGNIFICANT CHANGE UP (ref 0–6)
GAS PNL BLDA: SIGNIFICANT CHANGE UP
GAS PNL BLDMV: SIGNIFICANT CHANGE UP
GLUCOSE SERPL-MCNC: 173 MG/DL — HIGH (ref 70–99)
GLUCOSE SERPL-MCNC: 197 MG/DL — HIGH (ref 70–99)
HCO3 BLDA-SCNC: 25 MMOL/L — SIGNIFICANT CHANGE UP (ref 21–29)
HCO3 BLDMV-SCNC: 26 MMOL/L — SIGNIFICANT CHANGE UP (ref 20–28)
HCO3 BLDMV-SCNC: 27 MMOL/L — SIGNIFICANT CHANGE UP (ref 20–28)
HCO3 BLDMV-SCNC: 28 MMOL/L — SIGNIFICANT CHANGE UP (ref 20–28)
HCT VFR BLD CALC: 24.6 % — LOW (ref 39–50)
HCT VFR BLD CALC: 28.4 % — LOW (ref 39–50)
HGB BLD-MCNC: 8 G/DL — LOW (ref 13–17)
HGB BLD-MCNC: 9.5 G/DL — LOW (ref 13–17)
HOROWITZ INDEX BLDA+IHG-RTO: 100 — SIGNIFICANT CHANGE UP
HOROWITZ INDEX BLDMV+IHG-RTO: 100 — SIGNIFICANT CHANGE UP
HOROWITZ INDEX BLDMV+IHG-RTO: 40 — SIGNIFICANT CHANGE UP
HOROWITZ INDEX BLDMV+IHG-RTO: 50 — SIGNIFICANT CHANGE UP
INR BLD: 1.13 RATIO — SIGNIFICANT CHANGE UP (ref 0.88–1.16)
INR BLD: 1.2 RATIO — HIGH (ref 0.88–1.16)
LYMPHOCYTES # BLD AUTO: 1.1 K/UL — SIGNIFICANT CHANGE UP (ref 1–3.3)
LYMPHOCYTES # BLD AUTO: 15.2 % — SIGNIFICANT CHANGE UP (ref 13–44)
MCHC RBC-ENTMCNC: 27.6 PG — SIGNIFICANT CHANGE UP (ref 27–34)
MCHC RBC-ENTMCNC: 29.5 PG — SIGNIFICANT CHANGE UP (ref 27–34)
MCHC RBC-ENTMCNC: 32.5 GM/DL — SIGNIFICANT CHANGE UP (ref 32–36)
MCHC RBC-ENTMCNC: 33.4 GM/DL — SIGNIFICANT CHANGE UP (ref 32–36)
MCV RBC AUTO: 85.1 FL — SIGNIFICANT CHANGE UP (ref 80–100)
MCV RBC AUTO: 88.3 FL — SIGNIFICANT CHANGE UP (ref 80–100)
MONOCYTES # BLD AUTO: 0.4 K/UL — SIGNIFICANT CHANGE UP (ref 0–0.9)
MONOCYTES NFR BLD AUTO: 5.7 % — SIGNIFICANT CHANGE UP (ref 2–14)
NEUTROPHILS # BLD AUTO: 5.7 K/UL — SIGNIFICANT CHANGE UP (ref 1.8–7.4)
NEUTROPHILS NFR BLD AUTO: 78.5 % — HIGH (ref 43–77)
O2 CT VFR BLD CALC: 29 MMHG — LOW (ref 30–65)
O2 CT VFR BLD CALC: 33 MMHG — SIGNIFICANT CHANGE UP (ref 30–65)
O2 CT VFR BLD CALC: 33 MMHG — SIGNIFICANT CHANGE UP (ref 30–65)
O2 CT VFR BLD CALC: 35 MMHG — SIGNIFICANT CHANGE UP (ref 30–65)
O2 CT VFR BLD CALC: 40 MMHG — SIGNIFICANT CHANGE UP (ref 30–65)
PCO2 BLDA: 38 MMHG — SIGNIFICANT CHANGE UP (ref 32–46)
PCO2 BLDMV: 34 MMHG — SIGNIFICANT CHANGE UP (ref 30–65)
PCO2 BLDMV: 41 MMHG — SIGNIFICANT CHANGE UP (ref 30–65)
PCO2 BLDMV: 41 MMHG — SIGNIFICANT CHANGE UP (ref 30–65)
PCO2 BLDMV: 43 MMHG — SIGNIFICANT CHANGE UP (ref 30–65)
PCO2 BLDMV: 44 MMHG — SIGNIFICANT CHANGE UP (ref 30–65)
PH BLDA: 7.44 — SIGNIFICANT CHANGE UP (ref 7.35–7.45)
PH BLDMV: 7.39 — SIGNIFICANT CHANGE UP (ref 7.32–7.45)
PH BLDMV: 7.41 — SIGNIFICANT CHANGE UP (ref 7.32–7.45)
PH BLDMV: 7.42 — SIGNIFICANT CHANGE UP (ref 7.32–7.45)
PH BLDMV: 7.44 — SIGNIFICANT CHANGE UP (ref 7.32–7.45)
PH BLDMV: 7.5 — HIGH (ref 7.32–7.45)
PLATELET # BLD AUTO: 142 K/UL — LOW (ref 150–400)
PLATELET # BLD AUTO: 169 K/UL — SIGNIFICANT CHANGE UP (ref 150–400)
PO2 BLDA: 229 MMHG — HIGH (ref 74–108)
POTASSIUM SERPL-MCNC: 4.5 MMOL/L — SIGNIFICANT CHANGE UP (ref 3.5–5.3)
POTASSIUM SERPL-MCNC: 4.5 MMOL/L — SIGNIFICANT CHANGE UP (ref 3.5–5.3)
POTASSIUM SERPL-SCNC: 4.5 MMOL/L — SIGNIFICANT CHANGE UP (ref 3.5–5.3)
POTASSIUM SERPL-SCNC: 4.5 MMOL/L — SIGNIFICANT CHANGE UP (ref 3.5–5.3)
PROT SERPL-MCNC: 5 G/DL — LOW (ref 6–8.3)
PROT SERPL-MCNC: 5.3 G/DL — LOW (ref 6–8.3)
PROTHROM AB SERPL-ACNC: 12.4 SEC — SIGNIFICANT CHANGE UP (ref 9.8–12.7)
PROTHROM AB SERPL-ACNC: 13 SEC — HIGH (ref 9.8–12.7)
RBC # BLD: 2.89 M/UL — LOW (ref 4.2–5.8)
RBC # BLD: 3.22 M/UL — LOW (ref 4.2–5.8)
RBC # FLD: 18.1 % — HIGH (ref 10.3–14.5)
RBC # FLD: 18.1 % — HIGH (ref 10.3–14.5)
SAO2 % BLDA: 100 % — HIGH (ref 92–96)
SAO2 % BLDMV: 52 % — LOW (ref 60–90)
SAO2 % BLDMV: 60 % — SIGNIFICANT CHANGE UP (ref 60–90)
SAO2 % BLDMV: 66 % — SIGNIFICANT CHANGE UP (ref 60–90)
SAO2 % BLDMV: 68 % — SIGNIFICANT CHANGE UP (ref 60–90)
SAO2 % BLDMV: 75 % — SIGNIFICANT CHANGE UP (ref 60–90)
SODIUM SERPL-SCNC: 139 MMOL/L — SIGNIFICANT CHANGE UP (ref 135–145)
SODIUM SERPL-SCNC: 140 MMOL/L — SIGNIFICANT CHANGE UP (ref 135–145)
WBC # BLD: 7.3 K/UL — SIGNIFICANT CHANGE UP (ref 3.8–10.5)
WBC # BLD: 7.4 K/UL — SIGNIFICANT CHANGE UP (ref 3.8–10.5)
WBC # FLD AUTO: 7.3 K/UL — SIGNIFICANT CHANGE UP (ref 3.8–10.5)
WBC # FLD AUTO: 7.4 K/UL — SIGNIFICANT CHANGE UP (ref 3.8–10.5)

## 2017-08-01 PROCEDURE — 71010: CPT | Mod: 26,77

## 2017-08-01 PROCEDURE — 99291 CRITICAL CARE FIRST HOUR: CPT

## 2017-08-01 PROCEDURE — 99292 CRITICAL CARE ADDL 30 MIN: CPT

## 2017-08-01 PROCEDURE — 93010 ELECTROCARDIOGRAM REPORT: CPT

## 2017-08-01 PROCEDURE — 93010 ELECTROCARDIOGRAM REPORT: CPT | Mod: 77

## 2017-08-01 PROCEDURE — 35820 EXPLORE CHEST VESSELS: CPT | Mod: 78

## 2017-08-01 PROCEDURE — 71010: CPT | Mod: 26,76

## 2017-08-01 RX ORDER — FUROSEMIDE 40 MG
20 TABLET ORAL ONCE
Qty: 0 | Refills: 0 | Status: COMPLETED | OUTPATIENT
Start: 2017-08-01 | End: 2017-08-01

## 2017-08-01 RX ORDER — POTASSIUM CHLORIDE 20 MEQ
10 PACKET (EA) ORAL ONCE
Qty: 0 | Refills: 0 | Status: COMPLETED | OUTPATIENT
Start: 2017-08-01 | End: 2017-08-01

## 2017-08-01 RX ORDER — MEPERIDINE HYDROCHLORIDE 50 MG/ML
25 INJECTION INTRAMUSCULAR; INTRAVENOUS; SUBCUTANEOUS ONCE
Qty: 0 | Refills: 0 | Status: DISCONTINUED | OUTPATIENT
Start: 2017-08-01 | End: 2017-08-02

## 2017-08-01 RX ORDER — SODIUM CHLORIDE 9 MG/ML
1000 INJECTION INTRAMUSCULAR; INTRAVENOUS; SUBCUTANEOUS
Qty: 0 | Refills: 0 | Status: DISCONTINUED | OUTPATIENT
Start: 2017-08-01 | End: 2017-08-11

## 2017-08-01 RX ORDER — HYDROMORPHONE HYDROCHLORIDE 2 MG/ML
0.5 INJECTION INTRAMUSCULAR; INTRAVENOUS; SUBCUTANEOUS ONCE
Qty: 0 | Refills: 0 | Status: DISCONTINUED | OUTPATIENT
Start: 2017-08-01 | End: 2017-08-01

## 2017-08-01 RX ORDER — OXYCODONE AND ACETAMINOPHEN 5; 325 MG/1; MG/1
1 TABLET ORAL EVERY 4 HOURS
Qty: 0 | Refills: 0 | Status: DISCONTINUED | OUTPATIENT
Start: 2017-08-01 | End: 2017-08-05

## 2017-08-01 RX ORDER — POTASSIUM CHLORIDE 20 MEQ
10 PACKET (EA) ORAL
Qty: 0 | Refills: 0 | Status: DISCONTINUED | OUTPATIENT
Start: 2017-08-01 | End: 2017-08-02

## 2017-08-01 RX ORDER — POTASSIUM CHLORIDE 20 MEQ
10 PACKET (EA) ORAL
Qty: 0 | Refills: 0 | Status: COMPLETED | OUTPATIENT
Start: 2017-08-01 | End: 2017-08-01

## 2017-08-01 RX ORDER — ALBUMIN HUMAN 25 %
250 VIAL (ML) INTRAVENOUS ONCE
Qty: 0 | Refills: 0 | Status: COMPLETED | OUTPATIENT
Start: 2017-08-01 | End: 2017-08-01

## 2017-08-01 RX ORDER — DOBUTAMINE HCL 250MG/20ML
2.5 VIAL (ML) INTRAVENOUS
Qty: 500 | Refills: 0 | Status: DISCONTINUED | OUTPATIENT
Start: 2017-08-01 | End: 2017-08-02

## 2017-08-01 RX ORDER — PANTOPRAZOLE SODIUM 20 MG/1
40 TABLET, DELAYED RELEASE ORAL DAILY
Qty: 0 | Refills: 0 | Status: DISCONTINUED | OUTPATIENT
Start: 2017-08-01 | End: 2017-08-07

## 2017-08-01 RX ORDER — POTASSIUM CHLORIDE 20 MEQ
10 PACKET (EA) ORAL ONCE
Qty: 0 | Refills: 0 | Status: DISCONTINUED | OUTPATIENT
Start: 2017-08-01 | End: 2017-08-02

## 2017-08-01 RX ORDER — NOREPINEPHRINE BITARTRATE/D5W 8 MG/250ML
0.03 PLASTIC BAG, INJECTION (ML) INTRAVENOUS
Qty: 8 | Refills: 0 | Status: DISCONTINUED | OUTPATIENT
Start: 2017-08-01 | End: 2017-08-02

## 2017-08-01 RX ORDER — LEVOTHYROXINE SODIUM 125 MCG
25 TABLET ORAL DAILY
Qty: 0 | Refills: 0 | Status: DISCONTINUED | OUTPATIENT
Start: 2017-08-01 | End: 2017-08-01

## 2017-08-01 RX ORDER — INSULIN HUMAN 100 [IU]/ML
2 INJECTION, SOLUTION SUBCUTANEOUS
Qty: 100 | Refills: 0 | Status: DISCONTINUED | OUTPATIENT
Start: 2017-08-01 | End: 2017-08-02

## 2017-08-01 RX ORDER — CEFUROXIME AXETIL 250 MG
1500 TABLET ORAL EVERY 8 HOURS
Qty: 0 | Refills: 0 | Status: COMPLETED | OUTPATIENT
Start: 2017-08-01 | End: 2017-08-02

## 2017-08-01 RX ORDER — OXYCODONE AND ACETAMINOPHEN 5; 325 MG/1; MG/1
2 TABLET ORAL EVERY 6 HOURS
Qty: 0 | Refills: 0 | Status: DISCONTINUED | OUTPATIENT
Start: 2017-08-01 | End: 2017-08-01

## 2017-08-01 RX ADMIN — Medication 13.52 MICROGRAM(S)/KG/MIN: at 12:28

## 2017-08-01 RX ADMIN — Medication 100 MILLIEQUIVALENT(S): at 13:08

## 2017-08-01 RX ADMIN — Medication 10 MILLIGRAM(S): at 05:50

## 2017-08-01 RX ADMIN — Medication 100 MILLIEQUIVALENT(S): at 12:24

## 2017-08-01 RX ADMIN — Medication 6.76 MICROGRAM(S)/KG/MIN: at 17:43

## 2017-08-01 RX ADMIN — PANTOPRAZOLE SODIUM 40 MILLIGRAM(S): 20 TABLET, DELAYED RELEASE ORAL at 12:24

## 2017-08-01 RX ADMIN — Medication 100 MILLIGRAM(S): at 00:28

## 2017-08-01 RX ADMIN — Medication 100 MILLIGRAM(S): at 07:05

## 2017-08-01 RX ADMIN — Medication 100 MILLIEQUIVALENT(S): at 02:15

## 2017-08-01 RX ADMIN — Medication 100 MILLIEQUIVALENT(S): at 15:24

## 2017-08-01 RX ADMIN — Medication 100 MILLIGRAM(S): at 23:59

## 2017-08-01 RX ADMIN — HYDROMORPHONE HYDROCHLORIDE 0.5 MILLIGRAM(S): 2 INJECTION INTRAMUSCULAR; INTRAVENOUS; SUBCUTANEOUS at 05:45

## 2017-08-01 RX ADMIN — Medication 100 MILLIGRAM(S): at 15:25

## 2017-08-01 RX ADMIN — INSULIN HUMAN 2 UNIT(S)/HR: 100 INJECTION, SOLUTION SUBCUTANEOUS at 17:44

## 2017-08-01 RX ADMIN — HYDROMORPHONE HYDROCHLORIDE 0.5 MILLIGRAM(S): 2 INJECTION INTRAMUSCULAR; INTRAVENOUS; SUBCUTANEOUS at 17:58

## 2017-08-01 RX ADMIN — HYDROMORPHONE HYDROCHLORIDE 0.5 MILLIGRAM(S): 2 INJECTION INTRAMUSCULAR; INTRAVENOUS; SUBCUTANEOUS at 05:30

## 2017-08-01 RX ADMIN — Medication 125 MILLILITER(S): at 18:10

## 2017-08-01 RX ADMIN — Medication 100 MILLIEQUIVALENT(S): at 01:45

## 2017-08-01 RX ADMIN — SODIUM CHLORIDE 10 MILLILITER(S): 9 INJECTION INTRAMUSCULAR; INTRAVENOUS; SUBCUTANEOUS at 12:28

## 2017-08-01 RX ADMIN — Medication 5 MICROGRAM(S)/KG/MIN: at 12:28

## 2017-08-01 RX ADMIN — HYDROMORPHONE HYDROCHLORIDE 0.5 MILLIGRAM(S): 2 INJECTION INTRAMUSCULAR; INTRAVENOUS; SUBCUTANEOUS at 17:43

## 2017-08-01 RX ADMIN — Medication 20 MILLIGRAM(S): at 18:56

## 2017-08-01 NOTE — PROGRESS NOTE ADULT - SUBJECTIVE AND OBJECTIVE BOX
Chief complaint  Patient is a 67y old  Male who presents with a chief complaint of I need heart surgery (28 Jul 2017 00:49)   Review of systems  Patient in bed, comfortable, no fever, no hypoglycemia.    Labs and Fingersticks    CAPILLARY BLOOD GLUCOSE  170 (01 Aug 2017 21:00)  133 (01 Aug 2017 20:00)  101 (01 Aug 2017 19:00)  92 (01 Aug 2017 18:30)  91 (01 Aug 2017 18:00)  108 (01 Aug 2017 17:00)  114 (01 Aug 2017 16:00)  143 (01 Aug 2017 15:00)  148 (01 Aug 2017 14:00)  177 (01 Aug 2017 12:45)  167 (01 Aug 2017 11:30)  173 (01 Aug 2017 08:00)  163 (01 Aug 2017 07:00)  164 (01 Aug 2017 06:00)  160 (01 Aug 2017 05:00)  166 (01 Aug 2017 04:00)  173 (01 Aug 2017 03:00)  193 (01 Aug 2017 02:00)  168 (01 Aug 2017 01:00)  200 (01 Aug 2017 00:00)  127 (31 Jul 2017 23:00)  116 (31 Jul 2017 22:00)  229 (31 Jul 2017 11:41)  187 (31 Jul 2017 07:33)  229 (31 Jul 2017 01:19)  135 (30 Jul 2017 21:43)  190 (30 Jul 2017 16:25)  132 (30 Jul 2017 13:00)  209 (30 Jul 2017 11:06)  172 (30 Jul 2017 09:34)  144 (30 Jul 2017 07:38)  144 (30 Jul 2017 07:15)  134 (30 Jul 2017 05:52)  119 (30 Jul 2017 04:22)  105 (30 Jul 2017 02:32)  95 (30 Jul 2017 01:31)  86 (30 Jul 2017 00:26)  83 (29 Jul 2017 23:46)  82 (29 Jul 2017 23:33)  71 (29 Jul 2017 23:15)  75 (29 Jul 2017 22:52)  71 (29 Jul 2017 22:35)  56 (29 Jul 2017 22:20)  67 (29 Jul 2017 22:00)    Anion Gap, Serum: 15 (08-01 @ 11:38)  Anion Gap, Serum: 15 (08-01 @ 01:30)  Anion Gap, Serum: 17 (07-31 @ 21:34)      Calcium, Total Serum: 8.0 <L> (08-01 @ 11:38)  Calcium, Total Serum: 8.6 (08-01 @ 01:30)  Calcium, Total Serum: 8.8 (07-31 @ 21:34)  Albumin, Serum: 2.6 <L> (08-01 @ 11:38)  Albumin, Serum: 2.9 <L> (08-01 @ 01:30)    Alanine Aminotransferase (ALT/SGPT): 14 (08-01 @ 11:38)  Alanine Aminotransferase (ALT/SGPT): 13 (08-01 @ 01:30)  Alkaline Phosphatase, Serum: 44 (08-01 @ 11:38)  Alkaline Phosphatase, Serum: 47 (08-01 @ 01:30)  Aspartate Aminotransferase (AST/SGOT): 36 (08-01 @ 11:38)  Aspartate Aminotransferase (AST/SGOT): 34 (08-01 @ 01:30)        08-01    140  |  103  |  16  ----------------------------<  173<H>  4.5   |  22  |  1.08    Ca    8.0<L>      01 Aug 2017 11:38    TPro  5.0<L>  /  Alb  2.6<L>  /  TBili  1.0  /  DBili  x   /  AST  36  /  ALT  14  /  AlkPhos  44  08-01                        9.5    7.3   )-----------( 169      ( 01 Aug 2017 11:38 )             28.4     Medications  MEDICATIONS  (STANDING):  sodium chloride 0.9%. 1000 milliLiter(s) (10 mL/Hr) IV Continuous <Continuous>  pantoprazole  Injectable 40 milliGRAM(s) IV Push daily  potassium chloride  10 mEq/50 mL IVPB 10 milliEquivalent(s) IV Intermittent every 1 hour  potassium chloride  10 mEq/50 mL IVPB 10 milliEquivalent(s) IV Intermittent every 1 hour  potassium chloride  10 mEq/50 mL IVPB 10 milliEquivalent(s) IV Intermittent once  cefuroxime  IVPB 1500 milliGRAM(s) IV Intermittent every 8 hours  DOBUTamine Infusion 2.5 MICROgram(s)/kG/Min (6.758 mL/Hr) IV Continuous <Continuous>  norepinephrine Infusion 0.03 MICROgram(s)/kG/Min (5 mL/Hr) IV Continuous <Continuous>  insulin Infusion 2 Unit(s)/Hr (2 mL/Hr) IV Continuous <Continuous>      Physical Exam  General: Patient comfortable in bed  Vital Signs Last 12 Hrs  T(F): 98.4 (08-01-17 @ 21:00), Max: 98.4 (08-01-17 @ 21:00)  HR: 113 (08-01-17 @ 21:00) (101 - 113)  BP: --  BP(mean): --  RR: 18 (08-01-17 @ 21:00) (8 - 24)  SpO2: 99% (08-01-17 @ 21:00) (97% - 100%)  Neck: No palpable thyroid nodules.  CVS: S1S2, No murmurs  Respiratory: No wheezing, no crepitations  GI: Abdomen soft, bowel sounds positive  Musculoskeletal: Positive edema lower extremities bilaterally  Skin: No skin rashes, no ecchimosis    Diagnostics    Thyroid Stimulating Hormone, Serum: AM Sched. Collection: 29-Jul-2017 06:00 (07-28 @ 11:45)  Free Thyroxine, Serum: AM Sched. Collection: 29-Jul-2017 06:00 (07-28 @ 11:45)

## 2017-08-01 NOTE — PROGRESS NOTE ADULT - SUBJECTIVE AND OBJECTIVE BOX
Operation C 2L, ef 40 , pre op IABP   POD  1  Narrative  pt seen and examined, pt intubated, in cardiogenic shock , requiring pressors and inotropic support. Pt s/p CABG, with IABP preop  placement. pt post op bleeding requiring blood products.    Vital Signs Last 24 Hrs  T(C): 36 (01 Aug 2017 00:00), Max: 36.9 (2017 05:25)  T(F): 96.8 (01 Aug 2017 00:00), Max: 98.5 (2017 05:25)  HR: 97 (01 Aug 2017 00:15) (77 - 124)  BP: 111/62 (2017 11:41) (106/63 - 118/64)  BP(mean): --  RR: 14 (01 Aug 2017 00:15) (14 - 33)  SpO2: 100% (01 Aug 2017 00:15) (98% - 100%)  I&O's Detail    2017 07:01  -  2017 07:00  --------------------------------------------------------  IN:    heparin Infusion: 144 mL    insulin Infusion: 55 mL    Oral Fluid: 340 mL  Total IN: 539 mL    OUT:    Voided: 1000 mL  Total OUT: 1000 mL    Total NET: -461 mL      2017 07:01  -  01 Aug 2017 00:24  --------------------------------------------------------  IN:    aminocaproic acid Infusion: 250 mL    DOBUTamine Infusion: 20.4 mL    FFP (Fresh Frozen Plasma): 500 mL    insulin Infusion: 6 mL    IV PiggyBack: 250 mL    Lactated Ringers IV Bolus: 250 mL    norepinephrine Infusion: 33.7 mL    Packed Red Blood Cells: 250 mL    sodium chloride 0.45%.: 30 mL    vasopressin Infusion: 4 mL  Total IN: 1594.1 mL    OUT:    Chest Tube: 600 mL    Chest Tube: 85 mL    Indwelling Catheter - Urethral: 385 mL  Total OUT: 1070 mL    Total NET: 524.1 mL      I&O's Summary    2017 07:  -  2017 07:00  --------------------------------------------------------  IN: 539 mL / OUT: 1000 mL / NET: -461 mL    2017 07:01  -  01 Aug 2017 00:24  --------------------------------------------------------  IN: 1594.1 mL / OUT: 1070 mL / NET: 524.1 mL        LABS:  CBC Full  -  ( 2017 21:34 )  WBC Count : 9.0 K/uL  Hemoglobin : 9.7 g/dL  Hematocrit : 30.0 %  Platelet Count - Automated : 204 K/uL  Mean Cell Volume : 83.0 fl  Mean Cell Hemoglobin : 26.9 pg  Mean Cell Hemoglobin Concentration : 32.4 gm/dL  Auto Neutrophil # : x  Auto Lymphocyte # : x  Auto Monocyte # : x  Auto Eosinophil # : x  Auto Basophil # : x  Auto Neutrophil % : x  Auto Lymphocyte % : x  Auto Monocyte % : x  Auto Eosinophil % : x  Auto Basophil % : x        143  |  103  |  14  ----------------------------<  120<H>  3.9   |  23  |  0.92    Ca    8.8      2017 21:34  Mg     1.6     30      PT/INR - ( 2017 21:34 )   PT: 13.2 sec;   INR: 1.22 ratio         PTT - ( 2017 21:34 )  PTT:79.5 sec    Daily Height in cm: 167.64 (2017 01:19)    Daily Weight in k.2 (01 Aug 2017 00:00)    MEDICATIONS  (STANDING):  insulin Infusion 2 Unit(s)/Hr (2 mL/Hr) IV Continuous <Continuous>  aspirin Suppository 300 milliGRAM(s) Rectal once  metoclopramide Injectable 10 milliGRAM(s) IV Push every 8 hours  sodium chloride 0.45%. 1000 milliLiter(s) (10 mL/Hr) IV Continuous <Continuous>  pantoprazole  Injectable 40 milliGRAM(s) IV Push daily  docusate sodium 100 milliGRAM(s) Oral three times a day  potassium chloride  10 mEq/50 mL IVPB 10 milliEquivalent(s) IV Intermittent every 1 hour  potassium chloride  10 mEq/50 mL IVPB 10 milliEquivalent(s) IV Intermittent every 1 hour  potassium chloride  10 mEq/50 mL IVPB 10 milliEquivalent(s) IV Intermittent once  cefuroxime  IVPB 1500 milliGRAM(s) IV Intermittent every 8 hours  DOBUTamine Infusion 2.5 MICROgram(s)/kG/Min (6.758 mL/Hr) IV Continuous <Continuous>  vasopressin Infusion 0.033 Unit(s)/Min (2 mL/Hr) IV Continuous <Continuous>  norepinephrine Infusion 0.03 MICROgram(s)/kG/Min (5 mL/Hr) IV Continuous <Continuous>    MEDICATIONS  (PRN):  oxyCODONE    5 mG/acetaminophen 325 mG 1 Tablet(s) Oral every 4 hours PRN Mild Pain  oxyCODONE    5 mG/acetaminophen 325 mG 2 Tablet(s) Oral every 6 hours PRN Moderate Pain      General: sedated on CMV  in NAD  Chest: sternal dressing C/D/I  Heart: S1, S2, RRR  Lungs: CTA B/L, without W/R/R  Abdomen: Soft, ND, NT, positive BS  Extremeties: + 2 isabelle  edema B/L LE, positive DP pulses B/L   right Leg with IABP , + pulses      A/P    C 2L, ef 40 , pre op IABP       cont asa, statin,  GI / DVT prophylaxis,  keep K>4, mag >2.0 , pain management ,BB once HR resolves, glycemic control ,  Physical threapy follow up , D/C chest tube per protocol .   Wean IABP per protocol  monitor chest tube drainage    Does the patient have a history of CHF: yes  -If yes, systolic or diastolic: ys  -pre-operative EF: 30    Extubation within 24 hours:  no    Does the patient have a history of kidney disease:  no  -give CKD stage if applicable:  -what is patient's baseline Creatinine:    Beta Blockers contraindicated for the first 24 hours due to vasopressor/inotrpic medication:  -If on pressors, indication: pt is on pressors     Did the patient receive transfusion of blood and/or products:  -If yes, indication: yes , acute blood loss post op     DVT PPX:  yes    Lisa-operative antibiotics discontinued within 48 hours of CTU admission:  -name/date/time of discontinue  zinac    ,    RADIOLOGY & ADDITIONAL TESTS:    Patient care discussed on morning interdisciplinary rounds with CTS team.

## 2017-08-01 NOTE — PROGRESS NOTE ADULT - SUBJECTIVE AND OBJECTIVE BOX
FLEX STRINGER  MRN#:  [id number]    The patient is a 67y Male who was seen, evaluated, & examined with the CTICU staff on rounds and later in the day with a multidisciplinary care plan formulated & implemented.  All available clinical, laboratory, radiographic, pharmacologic, and electrocardiographic data were reviewed & analyzed.      The patient was in the CTICU in critical condition secondary to respiratory failure-persistent cardiopulmonary dysfunction, hemodynamically significant anemia/hypovolemia-shock, resolved mediastinal bleeding, persistent cardiovascular dysfunction, and stress hyperglycemia.      Respiratory failure required full ventilatory support, the following of ABG’s with A-line monitoring, continuous pulse oximetry monitoring, & an IV Propofol infusion for support & to evaluate for & prevent further decompensation secondary to persistent cardiopulmonary dysfunction.     Invasive hemodynamic monitoring with an A-line was required for the following of continuous MAP/BP monitoring to ensure adequate cardiovascular support and to evaluate for & help prevent decompensation while receiving intermittent volume expansion, blood transfusions, an IV Levophed drip, an IV Vasopressin drip, an IV Dobutamine drip, an IABP, and S/P RTOR secondary to hemodynamically significant anemia/hypovolemia-shock, cardiovascular dysfunction, acute postoperative blood loss anemia, and resolved bleeding.       Metabolic stability, stress hyperglycemia, & infection prophylaxis required an IV regular Insulin drip & the following of serial glucose levels to help achieve & maintain euglycemia.      Patient required more than the usual postoperative critical care management and I provided 110 minutes of non-continuous care to the patient.  Discussed at length with the CTICU staff and helped coordinate care.

## 2017-08-01 NOTE — PROGRESS NOTE ADULT - SUBJECTIVE AND OBJECTIVE BOX
Cardiovascular Disease Progress Note    Overnight events: Mr. Lundberg remains intubated and sedated. He is back from the OR this morning after evacuation of blood clots from his pericardium.   Otherwise review of systems negative    Objective Findings:  T(C): 35.2 (17 @ 11:15), Max: 36.7 (17 @ 04:00)  HR: 104 (17 @ 13:45) (90 - 124)  BP: --  RR: 14 (17 @ 13:45) (8 - 33)  SpO2: 100% (17 @ 13:45) (100% - 100%)  Wt(kg): --  Daily     Daily Weight in k.2 (01 Aug 2017 00:00)      Physical Exam:  Gen: NAD; sedated  HEENT: PERRL  CV: RRR, normal S1 + S2, no m/r/g  Lungs: Intubated  Abd: soft, non-tender  Ext: No edema    Telemetry: Sinus 100s; PVDs    Laboratory Data:                        9.5    7.3   )-----------( 169      ( 01 Aug 2017 11:38 )             28.4         140  |  103  |  16  ----------------------------<  173<H>  4.5   |  22  |  1.08    Ca    8.0<L>      01 Aug 2017 11:38    TPro  5.0<L>  /  Alb  2.6<L>  /  TBili  1.0  /  DBili  x   /  AST  36  /  ALT  14  /  AlkPhos  44      PT/INR - ( 01 Aug 2017 11:38 )   PT: 12.4 sec;   INR: 1.13 ratio         PTT - ( 01 Aug 2017 11:38 )  PTT:27.3 sec  CARDIAC MARKERS ( 2017 21:34 )  x     / 0.50 ng/mL / 254 U/L / x     / 19.3 ng/mL          Inpatient Medications:  MEDICATIONS  (STANDING):  sodium chloride 0.9%. 1000 milliLiter(s) (10 mL/Hr) IV Continuous <Continuous>  pantoprazole  Injectable 40 milliGRAM(s) IV Push daily  potassium chloride  10 mEq/50 mL IVPB 10 milliEquivalent(s) IV Intermittent every 1 hour  potassium chloride  10 mEq/50 mL IVPB 10 milliEquivalent(s) IV Intermittent every 1 hour  potassium chloride  10 mEq/50 mL IVPB 10 milliEquivalent(s) IV Intermittent once  cefuroxime  IVPB 1500 milliGRAM(s) IV Intermittent every 8 hours  DOBUTamine Infusion 5 MICROgram(s)/kG/Min (13.515 mL/Hr) IV Continuous <Continuous>  norepinephrine Infusion 0.03 MICROgram(s)/kG/Min (5 mL/Hr) IV Continuous <Continuous>  insulin Infusion 2 Unit(s)/Hr (2 mL/Hr) IV Continuous <Continuous>      Assessment: 67 year old man with uncontrolled DM and multivessel CAD now s/p CABG    #Multivessel CAD- s/p 2V CABG with LIMA to LAD and SVG to OM. He is now back from the OR for evacuation of pericardial clot. Continue dobutamine gtt for cardiogenic support. Now off levophed. Eventually start ASA and high intensity statin.     #Acute on chronic systolic CHF- continue dobutamine gtt.     #Uncontrolled DM II- insulin gtt. Endo input appreciated    Rest of care as per CT surgery team.     Over 35 minutes spent on total encounter; more than 50% of the visit was spent counseling and/or coordinating care by the attending physician.      Brayan Devlin M.D.   Cardiovascular Disease  [phone]

## 2017-08-01 NOTE — BRIEF OPERATIVE NOTE - PRE-OP DX
CAD (coronary artery disease)  07/31/2017    Active  [facility]
CAD (coronary artery disease)  07/31/2017    Active  [facility]

## 2017-08-01 NOTE — BRIEF OPERATIVE NOTE - OPERATION/FINDINGS
Copious amount of clot evacuated from the pericardium.  No clear source of postoperative bleeding identified.
CAD

## 2017-08-01 NOTE — BRIEF OPERATIVE NOTE - PROCEDURE
Reexploration, mediastinum, sternotomy approach  08/01/2017    Active  [facility]
Artery bypass, coronary  07/31/2017  CABG x 2 (LIMA->LAD, SVG->OM)  Active  [facility]

## 2017-08-02 LAB
ALBUMIN SERPL ELPH-MCNC: 3 G/DL — LOW (ref 3.3–5)
ALBUMIN SERPL ELPH-MCNC: 3.1 G/DL — LOW (ref 3.3–5)
ALP SERPL-CCNC: 52 U/L — SIGNIFICANT CHANGE UP (ref 40–120)
ALP SERPL-CCNC: 54 U/L — SIGNIFICANT CHANGE UP (ref 40–120)
ALT FLD-CCNC: 13 U/L RC — SIGNIFICANT CHANGE UP (ref 10–45)
ALT FLD-CCNC: 15 U/L RC — SIGNIFICANT CHANGE UP (ref 10–45)
ANION GAP SERPL CALC-SCNC: 11 MMOL/L — SIGNIFICANT CHANGE UP (ref 5–17)
ANION GAP SERPL CALC-SCNC: 12 MMOL/L — SIGNIFICANT CHANGE UP (ref 5–17)
APTT BLD: 27 SEC — LOW (ref 27.5–37.4)
AST SERPL-CCNC: 25 U/L — SIGNIFICANT CHANGE UP (ref 10–40)
AST SERPL-CCNC: 35 U/L — SIGNIFICANT CHANGE UP (ref 10–40)
BASE EXCESS BLDMV CALC-SCNC: -0.1 MMOL/L — SIGNIFICANT CHANGE UP (ref -3–3)
BASE EXCESS BLDMV CALC-SCNC: 0.2 MMOL/L — SIGNIFICANT CHANGE UP (ref -3–3)
BASE EXCESS BLDMV CALC-SCNC: 0.6 MMOL/L — SIGNIFICANT CHANGE UP (ref -3–3)
BASE EXCESS BLDMV CALC-SCNC: 1 MMOL/L — SIGNIFICANT CHANGE UP (ref -3–3)
BASE EXCESS BLDMV CALC-SCNC: 1 MMOL/L — SIGNIFICANT CHANGE UP (ref -3–3)
BASE EXCESS BLDMV CALC-SCNC: 1.3 MMOL/L — SIGNIFICANT CHANGE UP (ref -3–3)
BASE EXCESS BLDMV CALC-SCNC: 1.5 MMOL/L — SIGNIFICANT CHANGE UP (ref -3–3)
BILIRUB SERPL-MCNC: 0.8 MG/DL — SIGNIFICANT CHANGE UP (ref 0.2–1.2)
BILIRUB SERPL-MCNC: 0.8 MG/DL — SIGNIFICANT CHANGE UP (ref 0.2–1.2)
BLD GP AB SCN SERPL QL: NEGATIVE — SIGNIFICANT CHANGE UP
BUN SERPL-MCNC: 18 MG/DL — SIGNIFICANT CHANGE UP (ref 7–23)
BUN SERPL-MCNC: 22 MG/DL — SIGNIFICANT CHANGE UP (ref 7–23)
CALCIUM SERPL-MCNC: 8.1 MG/DL — LOW (ref 8.4–10.5)
CALCIUM SERPL-MCNC: 8.3 MG/DL — LOW (ref 8.4–10.5)
CHLORIDE SERPL-SCNC: 105 MMOL/L — SIGNIFICANT CHANGE UP (ref 96–108)
CHLORIDE SERPL-SCNC: 106 MMOL/L — SIGNIFICANT CHANGE UP (ref 96–108)
CO2 BLDMV-SCNC: 26 MMOL/L — SIGNIFICANT CHANGE UP (ref 21–29)
CO2 BLDMV-SCNC: 27 MMOL/L — SIGNIFICANT CHANGE UP (ref 21–29)
CO2 BLDMV-SCNC: 27 MMOL/L — SIGNIFICANT CHANGE UP (ref 21–29)
CO2 SERPL-SCNC: 23 MMOL/L — SIGNIFICANT CHANGE UP (ref 22–31)
CO2 SERPL-SCNC: 25 MMOL/L — SIGNIFICANT CHANGE UP (ref 22–31)
CREAT SERPL-MCNC: 1.21 MG/DL — SIGNIFICANT CHANGE UP (ref 0.5–1.3)
CREAT SERPL-MCNC: 1.26 MG/DL — SIGNIFICANT CHANGE UP (ref 0.5–1.3)
GAS PNL BLDA: SIGNIFICANT CHANGE UP
GAS PNL BLDMV: SIGNIFICANT CHANGE UP
GLUCOSE SERPL-MCNC: 124 MG/DL — HIGH (ref 70–99)
GLUCOSE SERPL-MCNC: 138 MG/DL — HIGH (ref 70–99)
HCO3 BLDMV-SCNC: 24 MMOL/L — SIGNIFICANT CHANGE UP (ref 20–28)
HCO3 BLDMV-SCNC: 25 MMOL/L — SIGNIFICANT CHANGE UP (ref 20–28)
HCO3 BLDMV-SCNC: 26 MMOL/L — SIGNIFICANT CHANGE UP (ref 20–28)
HCO3 BLDMV-SCNC: 26 MMOL/L — SIGNIFICANT CHANGE UP (ref 20–28)
HCT VFR BLD CALC: 28.7 % — LOW (ref 39–50)
HGB BLD-MCNC: 9.8 G/DL — LOW (ref 13–17)
HOROWITZ INDEX BLDMV+IHG-RTO: 28 — SIGNIFICANT CHANGE UP
HOROWITZ INDEX BLDMV+IHG-RTO: 36 — SIGNIFICANT CHANGE UP
HOROWITZ INDEX BLDMV+IHG-RTO: 40 — SIGNIFICANT CHANGE UP
INR BLD: 1.14 RATIO — SIGNIFICANT CHANGE UP (ref 0.88–1.16)
MAGNESIUM SERPL-MCNC: 2 MG/DL — SIGNIFICANT CHANGE UP (ref 1.6–2.6)
MCHC RBC-ENTMCNC: 29.7 PG — SIGNIFICANT CHANGE UP (ref 27–34)
MCHC RBC-ENTMCNC: 34.1 GM/DL — SIGNIFICANT CHANGE UP (ref 32–36)
MCV RBC AUTO: 87.2 FL — SIGNIFICANT CHANGE UP (ref 80–100)
O2 CT VFR BLD CALC: 32 MMHG — SIGNIFICANT CHANGE UP (ref 30–65)
O2 CT VFR BLD CALC: 36 MMHG — SIGNIFICANT CHANGE UP (ref 30–65)
O2 CT VFR BLD CALC: 37 MMHG — SIGNIFICANT CHANGE UP (ref 30–65)
O2 CT VFR BLD CALC: 37 MMHG — SIGNIFICANT CHANGE UP (ref 30–65)
O2 CT VFR BLD CALC: 38 MMHG — SIGNIFICANT CHANGE UP (ref 30–65)
O2 CT VFR BLD CALC: 41 MMHG — SIGNIFICANT CHANGE UP (ref 30–65)
O2 CT VFR BLD CALC: 41 MMHG — SIGNIFICANT CHANGE UP (ref 30–65)
PCO2 BLDMV: 38 MMHG — SIGNIFICANT CHANGE UP (ref 30–65)
PCO2 BLDMV: 38 MMHG — SIGNIFICANT CHANGE UP (ref 30–65)
PCO2 BLDMV: 39 MMHG — SIGNIFICANT CHANGE UP (ref 30–65)
PCO2 BLDMV: 40 MMHG — SIGNIFICANT CHANGE UP (ref 30–65)
PCO2 BLDMV: 41 MMHG — SIGNIFICANT CHANGE UP (ref 30–65)
PCO2 BLDMV: 44 MMHG — SIGNIFICANT CHANGE UP (ref 30–65)
PCO2 BLDMV: 44 MMHG — SIGNIFICANT CHANGE UP (ref 30–65)
PH BLDMV: 7.37 — SIGNIFICANT CHANGE UP (ref 7.32–7.45)
PH BLDMV: 7.39 — SIGNIFICANT CHANGE UP (ref 7.32–7.45)
PH BLDMV: 7.4 — SIGNIFICANT CHANGE UP (ref 7.32–7.45)
PH BLDMV: 7.42 — SIGNIFICANT CHANGE UP (ref 7.32–7.45)
PH BLDMV: 7.43 — SIGNIFICANT CHANGE UP (ref 7.32–7.45)
PHOSPHATE SERPL-MCNC: 3.9 MG/DL — SIGNIFICANT CHANGE UP (ref 2.5–4.5)
PLATELET # BLD AUTO: 162 K/UL — SIGNIFICANT CHANGE UP (ref 150–400)
POTASSIUM SERPL-MCNC: 4.3 MMOL/L — SIGNIFICANT CHANGE UP (ref 3.5–5.3)
POTASSIUM SERPL-MCNC: 4.7 MMOL/L — SIGNIFICANT CHANGE UP (ref 3.5–5.3)
POTASSIUM SERPL-SCNC: 4.3 MMOL/L — SIGNIFICANT CHANGE UP (ref 3.5–5.3)
POTASSIUM SERPL-SCNC: 4.7 MMOL/L — SIGNIFICANT CHANGE UP (ref 3.5–5.3)
PROT SERPL-MCNC: 5.7 G/DL — LOW (ref 6–8.3)
PROT SERPL-MCNC: 5.8 G/DL — LOW (ref 6–8.3)
PROTHROM AB SERPL-ACNC: 12.5 SEC — SIGNIFICANT CHANGE UP (ref 9.8–12.7)
RBC # BLD: 3.29 M/UL — LOW (ref 4.2–5.8)
RBC # FLD: 18.2 % — HIGH (ref 10.3–14.5)
RH IG SCN BLD-IMP: POSITIVE — SIGNIFICANT CHANGE UP
SAO2 % BLDMV: 56 % — LOW (ref 60–90)
SAO2 % BLDMV: 65 % — SIGNIFICANT CHANGE UP (ref 60–90)
SAO2 % BLDMV: 66 % — SIGNIFICANT CHANGE UP (ref 60–90)
SAO2 % BLDMV: 67 % — SIGNIFICANT CHANGE UP (ref 60–90)
SAO2 % BLDMV: 69 % — SIGNIFICANT CHANGE UP (ref 60–90)
SAO2 % BLDMV: 69 % — SIGNIFICANT CHANGE UP (ref 60–90)
SAO2 % BLDMV: 74 % — SIGNIFICANT CHANGE UP (ref 60–90)
SODIUM SERPL-SCNC: 139 MMOL/L — SIGNIFICANT CHANGE UP (ref 135–145)
SODIUM SERPL-SCNC: 143 MMOL/L — SIGNIFICANT CHANGE UP (ref 135–145)
WBC # BLD: 9 K/UL — SIGNIFICANT CHANGE UP (ref 3.8–10.5)
WBC # FLD AUTO: 9 K/UL — SIGNIFICANT CHANGE UP (ref 3.8–10.5)

## 2017-08-02 PROCEDURE — 99292 CRITICAL CARE ADDL 30 MIN: CPT

## 2017-08-02 PROCEDURE — 71010: CPT | Mod: 26

## 2017-08-02 PROCEDURE — 99291 CRITICAL CARE FIRST HOUR: CPT

## 2017-08-02 RX ORDER — FUROSEMIDE 40 MG
20 TABLET ORAL ONCE
Qty: 0 | Refills: 0 | Status: COMPLETED | OUTPATIENT
Start: 2017-08-02 | End: 2017-08-02

## 2017-08-02 RX ORDER — DIGOXIN 250 MCG
0.5 TABLET ORAL ONCE
Qty: 0 | Refills: 0 | Status: COMPLETED | OUTPATIENT
Start: 2017-08-02 | End: 2017-08-02

## 2017-08-02 RX ORDER — INSULIN GLARGINE 100 [IU]/ML
24 INJECTION, SOLUTION SUBCUTANEOUS AT BEDTIME
Qty: 0 | Refills: 0 | Status: DISCONTINUED | OUTPATIENT
Start: 2017-08-02 | End: 2017-08-05

## 2017-08-02 RX ORDER — DEXTROSE 50 % IN WATER 50 %
12.5 SYRINGE (ML) INTRAVENOUS ONCE
Qty: 0 | Refills: 0 | Status: DISCONTINUED | OUTPATIENT
Start: 2017-08-02 | End: 2017-08-11

## 2017-08-02 RX ORDER — DIGOXIN 250 MCG
0.25 TABLET ORAL EVERY 6 HOURS
Qty: 0 | Refills: 0 | Status: COMPLETED | OUTPATIENT
Start: 2017-08-02 | End: 2017-08-03

## 2017-08-02 RX ORDER — ASPIRIN/CALCIUM CARB/MAGNESIUM 324 MG
81 TABLET ORAL DAILY
Qty: 0 | Refills: 0 | Status: DISCONTINUED | OUTPATIENT
Start: 2017-08-02 | End: 2017-08-11

## 2017-08-02 RX ORDER — POTASSIUM CHLORIDE 20 MEQ
10 PACKET (EA) ORAL ONCE
Qty: 0 | Refills: 0 | Status: COMPLETED | OUTPATIENT
Start: 2017-08-02 | End: 2017-08-02

## 2017-08-02 RX ORDER — HUMAN INSULIN 100 [IU]/ML
10 INJECTION, SUSPENSION SUBCUTANEOUS ONCE
Qty: 0 | Refills: 0 | Status: COMPLETED | OUTPATIENT
Start: 2017-08-02 | End: 2017-08-02

## 2017-08-02 RX ORDER — AMIODARONE HYDROCHLORIDE 400 MG/1
150 TABLET ORAL ONCE
Qty: 0 | Refills: 0 | Status: COMPLETED | OUTPATIENT
Start: 2017-08-02 | End: 2017-08-02

## 2017-08-02 RX ORDER — ALBUMIN HUMAN 25 %
250 VIAL (ML) INTRAVENOUS ONCE
Qty: 0 | Refills: 0 | Status: COMPLETED | OUTPATIENT
Start: 2017-08-02 | End: 2017-08-02

## 2017-08-02 RX ORDER — MAGNESIUM SULFATE 500 MG/ML
2 VIAL (ML) INJECTION ONCE
Qty: 0 | Refills: 0 | Status: COMPLETED | OUTPATIENT
Start: 2017-08-02 | End: 2017-08-02

## 2017-08-02 RX ORDER — NICARDIPINE HYDROCHLORIDE 30 MG/1
5 CAPSULE, EXTENDED RELEASE ORAL
Qty: 40 | Refills: 0 | Status: DISCONTINUED | OUTPATIENT
Start: 2017-08-02 | End: 2017-08-02

## 2017-08-02 RX ORDER — ACETAMINOPHEN 500 MG
1000 TABLET ORAL ONCE
Qty: 0 | Refills: 0 | Status: COMPLETED | OUTPATIENT
Start: 2017-08-02 | End: 2017-08-02

## 2017-08-02 RX ORDER — ATORVASTATIN CALCIUM 80 MG/1
80 TABLET, FILM COATED ORAL AT BEDTIME
Qty: 0 | Refills: 0 | Status: DISCONTINUED | OUTPATIENT
Start: 2017-08-02 | End: 2017-08-11

## 2017-08-02 RX ORDER — PROPOFOL 10 MG/ML
18.5 INJECTION, EMULSION INTRAVENOUS
Qty: 500 | Refills: 0 | Status: DISCONTINUED | OUTPATIENT
Start: 2017-08-02 | End: 2017-08-02

## 2017-08-02 RX ORDER — DEXTROSE 50 % IN WATER 50 %
25 SYRINGE (ML) INTRAVENOUS ONCE
Qty: 0 | Refills: 0 | Status: DISCONTINUED | OUTPATIENT
Start: 2017-08-02 | End: 2017-08-11

## 2017-08-02 RX ORDER — POTASSIUM CHLORIDE 20 MEQ
10 PACKET (EA) ORAL
Qty: 0 | Refills: 0 | Status: COMPLETED | OUTPATIENT
Start: 2017-08-02 | End: 2017-08-02

## 2017-08-02 RX ORDER — INSULIN LISPRO 100/ML
VIAL (ML) SUBCUTANEOUS AT BEDTIME
Qty: 0 | Refills: 0 | Status: DISCONTINUED | OUTPATIENT
Start: 2017-08-02 | End: 2017-08-11

## 2017-08-02 RX ORDER — DIGOXIN 250 MCG
0.12 TABLET ORAL DAILY
Qty: 0 | Refills: 0 | Status: DISCONTINUED | OUTPATIENT
Start: 2017-08-03 | End: 2017-08-11

## 2017-08-02 RX ORDER — ENOXAPARIN SODIUM 100 MG/ML
40 INJECTION SUBCUTANEOUS DAILY
Qty: 0 | Refills: 0 | Status: DISCONTINUED | OUTPATIENT
Start: 2017-08-02 | End: 2017-08-09

## 2017-08-02 RX ORDER — GLUCAGON INJECTION, SOLUTION 0.5 MG/.1ML
1 INJECTION, SOLUTION SUBCUTANEOUS ONCE
Qty: 0 | Refills: 0 | Status: DISCONTINUED | OUTPATIENT
Start: 2017-08-02 | End: 2017-08-11

## 2017-08-02 RX ORDER — MILRINONE LACTATE 1 MG/ML
0.38 INJECTION, SOLUTION INTRAVENOUS
Qty: 20 | Refills: 0 | Status: DISCONTINUED | OUTPATIENT
Start: 2017-08-02 | End: 2017-08-02

## 2017-08-02 RX ORDER — DEXTROSE 50 % IN WATER 50 %
1 SYRINGE (ML) INTRAVENOUS ONCE
Qty: 0 | Refills: 0 | Status: DISCONTINUED | OUTPATIENT
Start: 2017-08-02 | End: 2017-08-11

## 2017-08-02 RX ORDER — METOPROLOL TARTRATE 50 MG
25 TABLET ORAL
Qty: 0 | Refills: 0 | Status: DISCONTINUED | OUTPATIENT
Start: 2017-08-02 | End: 2017-08-03

## 2017-08-02 RX ORDER — METOPROLOL TARTRATE 50 MG
5 TABLET ORAL ONCE
Qty: 0 | Refills: 0 | Status: COMPLETED | OUTPATIENT
Start: 2017-08-02 | End: 2017-08-02

## 2017-08-02 RX ORDER — SODIUM CHLORIDE 9 MG/ML
1000 INJECTION, SOLUTION INTRAVENOUS
Qty: 0 | Refills: 0 | Status: DISCONTINUED | OUTPATIENT
Start: 2017-08-02 | End: 2017-08-11

## 2017-08-02 RX ORDER — INSULIN LISPRO 100/ML
VIAL (ML) SUBCUTANEOUS
Qty: 0 | Refills: 0 | Status: DISCONTINUED | OUTPATIENT
Start: 2017-08-02 | End: 2017-08-11

## 2017-08-02 RX ORDER — DIGOXIN 250 MCG
0.25 TABLET ORAL EVERY 6 HOURS
Qty: 0 | Refills: 0 | Status: DISCONTINUED | OUTPATIENT
Start: 2017-08-02 | End: 2017-08-02

## 2017-08-02 RX ADMIN — Medication 400 MILLIGRAM(S): at 16:30

## 2017-08-02 RX ADMIN — Medication 50 GRAM(S): at 01:15

## 2017-08-02 RX ADMIN — Medication 6.76 MICROGRAM(S)/KG/MIN: at 06:23

## 2017-08-02 RX ADMIN — MEPERIDINE HYDROCHLORIDE 25 MILLIGRAM(S): 50 INJECTION INTRAMUSCULAR; INTRAVENOUS; SUBCUTANEOUS at 17:16

## 2017-08-02 RX ADMIN — Medication 100 MILLIGRAM(S): at 16:48

## 2017-08-02 RX ADMIN — Medication 100 MILLIGRAM(S): at 06:22

## 2017-08-02 RX ADMIN — PROPOFOL 10 MICROGRAM(S)/KG/MIN: 10 INJECTION, EMULSION INTRAVENOUS at 06:24

## 2017-08-02 RX ADMIN — ENOXAPARIN SODIUM 40 MILLIGRAM(S): 100 INJECTION SUBCUTANEOUS at 12:59

## 2017-08-02 RX ADMIN — INSULIN HUMAN 2 UNIT(S)/HR: 100 INJECTION, SOLUTION SUBCUTANEOUS at 06:23

## 2017-08-02 RX ADMIN — Medication 0.5 MILLIGRAM(S): at 14:17

## 2017-08-02 RX ADMIN — MEPERIDINE HYDROCHLORIDE 25 MILLIGRAM(S): 50 INJECTION INTRAMUSCULAR; INTRAVENOUS; SUBCUTANEOUS at 17:31

## 2017-08-02 RX ADMIN — Medication 100 MILLIEQUIVALENT(S): at 06:30

## 2017-08-02 RX ADMIN — Medication 125 MILLILITER(S): at 12:29

## 2017-08-02 RX ADMIN — Medication 100 MILLIEQUIVALENT(S): at 08:00

## 2017-08-02 RX ADMIN — Medication 50 MILLIEQUIVALENT(S): at 00:50

## 2017-08-02 RX ADMIN — Medication 5 MILLIGRAM(S): at 21:24

## 2017-08-02 RX ADMIN — Medication 81 MILLIGRAM(S): at 16:30

## 2017-08-02 RX ADMIN — Medication 25 MILLIGRAM(S): at 21:24

## 2017-08-02 RX ADMIN — HUMAN INSULIN 10 UNIT(S): 100 INJECTION, SUSPENSION SUBCUTANEOUS at 10:46

## 2017-08-02 RX ADMIN — Medication 0.25 MILLIGRAM(S): at 20:10

## 2017-08-02 RX ADMIN — PANTOPRAZOLE SODIUM 40 MILLIGRAM(S): 20 TABLET, DELAYED RELEASE ORAL at 12:59

## 2017-08-02 RX ADMIN — ATORVASTATIN CALCIUM 80 MILLIGRAM(S): 80 TABLET, FILM COATED ORAL at 21:24

## 2017-08-02 RX ADMIN — Medication 20 MILLIGRAM(S): at 21:24

## 2017-08-02 RX ADMIN — AMIODARONE HYDROCHLORIDE 600 MILLIGRAM(S): 400 TABLET ORAL at 16:47

## 2017-08-02 RX ADMIN — Medication 1000 MILLIGRAM(S): at 16:45

## 2017-08-02 NOTE — PROGRESS NOTE ADULT - SUBJECTIVE AND OBJECTIVE BOX
Operation C 2L, ef 40 , pre op IABP /  RTOR for exploration for bleeding   POD  2  Narrative  pt seen and examined, pt intubated, in cardiogenic shock , requiring pressors and inotropic support. Pt s/p CABG, with IABP preop  placement. pt post op bleeding requiring blood products.  RTOR for     sodium chloride 0.9%. 1000 milliLiter(s) IV Continuous <Continuous>  oxyCODONE    5 mG/acetaminophen 325 mG 1 Tablet(s) Oral every 4 hours PRN  oxyCODONE    5 mG/acetaminophen 325 mG 2 Tablet(s) Oral every 6 hours PRN  meperidine     Injectable 25 milliGRAM(s) IV Push once PRN  pantoprazole  Injectable 40 milliGRAM(s) IV Push daily  potassium chloride  10 mEq/50 mL IVPB 10 milliEquivalent(s) IV Intermittent every 1 hour  potassium chloride  10 mEq/50 mL IVPB 10 milliEquivalent(s) IV Intermittent every 1 hour  potassium chloride  10 mEq/50 mL IVPB 10 milliEquivalent(s) IV Intermittent once  cefuroxime  IVPB 1500 milliGRAM(s) IV Intermittent every 8 hours  DOBUTamine Infusion 2.5 MICROgram(s)/kG/Min IV Continuous <Continuous>  norepinephrine Infusion 0.03 MICROgram(s)/kG/Min IV Continuous <Continuous>  insulin Infusion 2 Unit(s)/Hr IV Continuous <Continuous>                            9.5    7.3   )-----------( 169      ( 01 Aug 2017 11:38 )             28.4       Hemoglobin: 9.5 g/dL (08-01 @ 11:38)  Hemoglobin: 8.0 g/dL (08-01 @ 01:30)  Hemoglobin: 9.7 g/dL (07-31 @ 21:34)  Hemoglobin: 10.3 g/dL (07-30 @ 06:37)  Hemoglobin: 10.4 g/dL (07-29 @ 00:28)      08-01    140  |  103  |  16  ----------------------------<  173<H>  4.5   |  22  |  1.08    Ca    8.0<L>      01 Aug 2017 11:38    TPro  5.0<L>  /  Alb  2.6<L>  /  TBili  1.0  /  DBili  x   /  AST  36  /  ALT  14  /  AlkPhos  44  08-01    Creatinine Trend: 1.08<--, 1.05<--, 0.92<--, 0.98<--, 0.90<--, 0.92<--    COAGS: PT/INR - ( 01 Aug 2017 11:38 )   PT: 12.4 sec;   INR: 1.13 ratio         PTT - ( 01 Aug 2017 11:38 )  PTT:27.3 sec    CARDIAC MARKERS ( 31 Jul 2017 21:34 )  x     / 0.50 ng/mL / 254 U/L / x     / 19.3 ng/mL        T(C): 36.9 (08-02-17 @ 00:00), Max: 36.9 (08-01-17 @ 21:00)  HR: 103 (08-02-17 @ 00:38) (90 - 113)  BP: --  RR: 20 (08-02-17 @ 00:00) (8 - 24)  SpO2: 99% (08-02-17 @ 00:38) (97% - 100%)  Wt(kg): --    I&O's Summary    31 Jul 2017 07:01  -  01 Aug 2017 07:00  --------------------------------------------------------  IN: 3083.7 mL / OUT: 2145 mL / NET: 938.7 mL    01 Aug 2017 07:01  -  02 Aug 2017 01:34  --------------------------------------------------------  IN: 1360.5 mL / OUT: 1860 mL / NET: -499.5 mL        General: sedated on CMV  in NAD  Chest: sternal dressing C/D/I  Heart: S1, S2, RRR  Lungs: CTA B/L, without W/R/R  Abdomen: Soft, ND, NT, positive BS  Extremeties: + 2 isabelle  edema B/L LE, positive DP pulses B/L   right Leg with IABP , + pulses      A/P    C 2L, ef 40 , pre op IABP   /  RTOR for bleeding exploration .     cont asa, statin,  GI / DVT prophylaxis,  keep K>4, mag >2.0 , pain management ,BB once HR resolves, glycemic control ,  Physical threapy follow up , D/C chest tube per protocol .   Wean IABP per protocol  monitor chest tube drainage    Does the patient have a history of CHF: yes  -If yes, systolic or diastolic: ys  -pre-operative EF: 30    Extubation within 24 hours:  no    Does the patient have a history of kidney disease:  no  -give CKD stage if applicable:  -what is patient's baseline Creatinine:    Beta Blockers contraindicated for the first 24 hours due to vasopressor/inotrpic medication:  -If on pressors, indication: pt is on pressors     Did the patient receive transfusion of blood and/or products:  -If yes, indication: yes , acute blood loss post op     DVT PPX:  yes    Lisa-operative antibiotics discontinued within 48 hours of CTU admission:  -name/date/time of discontinue  zinac   8/2 ,    RADIOLOGY & ADDITIONAL TESTS:    Patient care discussed on morning interdisciplinary rounds with CTS team.

## 2017-08-02 NOTE — AIRWAY REMOVAL NOTE  ADULT & PEDS - ARTIFICAL AIRWAY REMOVAL COMMENTS
Written order for extubation verified. Pt was identified by full name and birthday compared to ID band.  Present during the procedure was Pedro Luis DOZIER

## 2017-08-02 NOTE — PROGRESS NOTE ADULT - SUBJECTIVE AND OBJECTIVE BOX
Chief complaint  Patient is a 67y old  Male who presents with a chief complaint of I need heart surgery (28 Jul 2017 00:49)   Review of systems  Patient in bed, comfortable, no fever,  no hypoglycemia.    Labs and Fingersticks    CAPILLARY BLOOD GLUCOSE  93 (02 Aug 2017 19:30)  93 (02 Aug 2017 19:00)  101 (02 Aug 2017 18:00)  104 (02 Aug 2017 17:00)  115 (02 Aug 2017 16:00)  121 (02 Aug 2017 15:00)  133 (02 Aug 2017 14:00)  136 (02 Aug 2017 13:00)  151 (02 Aug 2017 12:00)  152 (02 Aug 2017 11:00)  156 (02 Aug 2017 10:00)  160 (02 Aug 2017 09:00)  160 (02 Aug 2017 08:00)  116 (02 Aug 2017 07:00)  95 (02 Aug 2017 06:00)  95 (02 Aug 2017 05:30)  98 (02 Aug 2017 05:00)  101 (02 Aug 2017 04:00)  121 (02 Aug 2017 03:00)  130 (02 Aug 2017 02:00)  137 (02 Aug 2017 01:00)  145 (02 Aug 2017 00:00)  154 (01 Aug 2017 23:00)  169 (01 Aug 2017 22:00)  170 (01 Aug 2017 21:00)  133 (01 Aug 2017 20:00)  101 (01 Aug 2017 19:00)  92 (01 Aug 2017 18:30)  91 (01 Aug 2017 18:00)  108 (01 Aug 2017 17:00)  114 (01 Aug 2017 16:00)  143 (01 Aug 2017 15:00)  148 (01 Aug 2017 14:00)  177 (01 Aug 2017 12:45)  167 (01 Aug 2017 11:30)  173 (01 Aug 2017 08:00)  163 (01 Aug 2017 07:00)  164 (01 Aug 2017 06:00)  160 (01 Aug 2017 05:00)  166 (01 Aug 2017 04:00)  173 (01 Aug 2017 03:00)  193 (01 Aug 2017 02:00)  168 (01 Aug 2017 01:00)  200 (01 Aug 2017 00:00)  127 (31 Jul 2017 23:00)  116 (31 Jul 2017 22:00)  229 (31 Jul 2017 11:41)  187 (31 Jul 2017 07:33)  229 (31 Jul 2017 01:19)  135 (30 Jul 2017 21:43)    Anion Gap, Serum: 11 (08-02 @ 17:08)  Anion Gap, Serum: 12 (08-02 @ 01:22)  Anion Gap, Serum: 15 (08-01 @ 11:38)  Anion Gap, Serum: 15 (08-01 @ 01:30)  Anion Gap, Serum: 17 (07-31 @ 21:34)      Calcium, Total Serum: 8.3 <L> (08-02 @ 17:08)  Calcium, Total Serum: 8.1 <L> (08-02 @ 01:22)  Calcium, Total Serum: 8.0 <L> (08-01 @ 11:38)  Calcium, Total Serum: 8.6 (08-01 @ 01:30)  Calcium, Total Serum: 8.8 (07-31 @ 21:34)  Albumin, Serum: 3.0 <L> (08-02 @ 17:08)  Albumin, Serum: 3.1 <L> (08-02 @ 01:22)  Albumin, Serum: 2.6 <L> (08-01 @ 11:38)  Albumin, Serum: 2.9 <L> (08-01 @ 01:30)    Alanine Aminotransferase (ALT/SGPT): 13 (08-02 @ 17:08)  Alanine Aminotransferase (ALT/SGPT): 15 (08-02 @ 01:22)  Alanine Aminotransferase (ALT/SGPT): 14 (08-01 @ 11:38)  Alanine Aminotransferase (ALT/SGPT): 13 (08-01 @ 01:30)  Alkaline Phosphatase, Serum: 54 (08-02 @ 17:08)  Alkaline Phosphatase, Serum: 52 (08-02 @ 01:22)  Alkaline Phosphatase, Serum: 44 (08-01 @ 11:38)  Alkaline Phosphatase, Serum: 47 (08-01 @ 01:30)  Aspartate Aminotransferase (AST/SGOT): 25 (08-02 @ 17:08)  Aspartate Aminotransferase (AST/SGOT): 35 (08-02 @ 01:22)  Aspartate Aminotransferase (AST/SGOT): 36 (08-01 @ 11:38)  Aspartate Aminotransferase (AST/SGOT): 34 (08-01 @ 01:30)        08-02    139  |  105  |  22  ----------------------------<  124<H>  4.3   |  23  |  1.26    Ca    8.3<L>      02 Aug 2017 17:08  Phos  3.9     08-02  Mg     2.0     08-02    TPro  5.8<L>  /  Alb  3.0<L>  /  TBili  0.8  /  DBili  x   /  AST  25  /  ALT  13  /  AlkPhos  54  08-02                        9.8    9.0   )-----------( 162      ( 02 Aug 2017 01:22 )             28.7     Medications  MEDICATIONS  (STANDING):  sodium chloride 0.9%. 1000 milliLiter(s) (10 mL/Hr) IV Continuous <Continuous>  pantoprazole  Injectable 40 milliGRAM(s) IV Push daily  cefuroxime  IVPB 1500 milliGRAM(s) IV Intermittent every 8 hours  insulin Infusion 2 Unit(s)/Hr (2 mL/Hr) IV Continuous <Continuous>  milrinone Infusion 0.375 MICROgram(s)/kG/Min (10.136 mL/Hr) IV Continuous <Continuous>  aspirin enteric coated 81 milliGRAM(s) Oral daily  enoxaparin Injectable 40 milliGRAM(s) SubCutaneous daily  atorvastatin 80 milliGRAM(s) Oral at bedtime  dextrose 5%. 1000 milliLiter(s) (50 mL/Hr) IV Continuous <Continuous>  dextrose 50% Injectable 12.5 Gram(s) IV Push once  dextrose 50% Injectable 25 Gram(s) IV Push once  dextrose 50% Injectable 25 Gram(s) IV Push once  insulin glargine Injectable (LANTUS) 24 Unit(s) SubCutaneous at bedtime  insulin lispro (HumaLOG) corrective regimen sliding scale   SubCutaneous three times a day before meals  insulin lispro (HumaLOG) corrective regimen sliding scale   SubCutaneous at bedtime  digoxin  Injectable 0.25 milliGRAM(s) IV Push every 6 hours  furosemide    Tablet 20 milliGRAM(s) Oral once      Physical Exam  General: Patient comfortable in bed  Vital Signs Last 12 Hrs  T(F): 98.2 (08-02-17 @ 20:00), Max: 98.2 (08-02-17 @ 16:00)  HR: 116 (08-02-17 @ 20:00) (112 - 124)  BP: 130/69 (08-02-17 @ 20:00) (130/69 - 130/69)  BP(mean): 92 (08-02-17 @ 20:00) (92 - 92)  RR: 30 (08-02-17 @ 20:00) (12 - 38)  SpO2: 99% (08-02-17 @ 20:00) (99% - 100%)  Neck: No palpable thyroid nodules.  CVS: S1S2, No murmurs  Respiratory: No wheezing, no crepitations  GI: Abdomen soft, bowel sounds positive  Musculoskeletal: Positive edema lower extremities bilaterally  Skin: No skin rashes, no ecchimosis    Diagnostics    Thyroid Stimulating Hormone, Serum: AM Sched. Collection: 29-Jul-2017 06:00 (07-28 @ 11:45)  Free Thyroxine, Serum: AM Sched. Collection: 29-Jul-2017 06:00 (07-28 @ 11:45)

## 2017-08-02 NOTE — PROGRESS NOTE ADULT - SUBJECTIVE AND OBJECTIVE BOX
PROCEDURE NOTE  REMOVAL OF INTRA AORTIC BALLOON PUMP    The IABP (intra-aortic balloon pump) was weaned according to protocol.  Hemodynamics remained stable.  Pulses in the   RIGHT            lower extreity are palpable/audible by doppler/absent.  The patient was placed in the supine position.  The insertion site was identified and the sutures were removed.  The IABP was turned off and the balloon deflated.  The IABP was then removed.  Direct pressure was applied for               minutes.  A sandbag was applied and is  to remain in place for three hours.    Monitoring of the   Right     groin and both lower extremities including neuro-vascular checks and vital signs every 15 minutes  x4, then every 30 minutes x 2, then every 1 hr x 4 was ordered.      Complications:   none

## 2017-08-02 NOTE — PROGRESS NOTE ADULT - SUBJECTIVE AND OBJECTIVE BOX
Cardiovascular Disease Progress Note    Overnight events: No acute events overnight. Mr. Lundberg is intubated, but awake. He denies pain.   Otherwise review of systems negative    Objective Findings:  T(C): 36.7 (17 @ 08:00), Max: 36.9 (17 @ 21:00)  HR: 114 (17 @ 09:15) (97 - 116)  BP: 129/74 (17 @ 07:00) (129/74 - 129/74)  RR: 26 (17 @ 09:15) (8 - 26)  SpO2: 100% (17 @ 09:15) (97% - 100%)  Wt(kg): --  Daily     Daily Weight in k.8 (02 Aug 2017 00:00)      Physical Exam:  Gen: NAD  HEENT: EOMI  CV: RRR, normal S1 + S2, no m/r/g  Lungs: Intubated  Abd: soft, non-tender  Ext: No edema    Telemetry: Sinus 100-110s; Occasional PVDs    Laboratory Data:                        9.8    9.0   )-----------( 162      ( 02 Aug 2017 01:22 )             28.7     08-02    143  |  106  |  18  ----------------------------<  138<H>  4.7   |  25  |  1.21    Ca    8.1<L>      02 Aug 2017 01:22  Phos  3.9     08-  Mg     2.0         TPro  5.7<L>  /  Alb  3.1<L>  /  TBili  0.8  /  DBili  x   /  AST  35  /  ALT  15  /  AlkPhos  52  08-02    PT/INR - ( 02 Aug 2017 01:22 )   PT: 12.5 sec;   INR: 1.14 ratio         PTT - ( 02 Aug 2017 01:22 )  PTT:27.0 sec  CARDIAC MARKERS ( 2017 21:34 )  x     / 0.50 ng/mL / 254 U/L / x     / 19.3 ng/mL          Inpatient Medications:  MEDICATIONS  (STANDING):  sodium chloride 0.9%. 1000 milliLiter(s) (10 mL/Hr) IV Continuous <Continuous>  pantoprazole  Injectable 40 milliGRAM(s) IV Push daily  potassium chloride  10 mEq/50 mL IVPB 10 milliEquivalent(s) IV Intermittent every 1 hour  potassium chloride  10 mEq/50 mL IVPB 10 milliEquivalent(s) IV Intermittent every 1 hour  potassium chloride  10 mEq/50 mL IVPB 10 milliEquivalent(s) IV Intermittent once  cefuroxime  IVPB 1500 milliGRAM(s) IV Intermittent every 8 hours  norepinephrine Infusion 0.03 MICROgram(s)/kG/Min (5 mL/Hr) IV Continuous <Continuous>  insulin Infusion 2 Unit(s)/Hr (2 mL/Hr) IV Continuous <Continuous>  propofol Infusion 18.498 MICROgram(s)/kG/Min (10 mL/Hr) IV Continuous <Continuous>  milrinone Infusion 0.375 MICROgram(s)/kG/Min (10.136 mL/Hr) IV Continuous <Continuous>  niCARdipine Infusion 5 mG/Hr (25 mL/Hr) IV Continuous <Continuous>      Assessment: 67 year old man with uncontrolled DM and multivessel CAD now s/p CABG    #Multivessel CAD- s/p 2V CABG with LIMA to LAD and SVG to OM. He is now back from the OR for evacuation of pericardial clot. Eventually start ASA and high intensity statin.     #Cardiogenic shock- Continue milrinone gtt for cardiogenic support. Now off levophed. IABP removed this morning.     #Respiratory failure- extubation trial today.     #Acute on chronic systolic CHF- continue milrinone gtt.     #Uncontrolled DM II- insulin gtt. Endo input appreciated    Rest of care as per CT surgery team.     Over 35 minutes spent on total encounter; more than 50% of the visit was spent counseling and/or coordinating care by the attending physician.      Brayan Devlin M.D.   Cardiovascular Disease  [phone]

## 2017-08-02 NOTE — PROGRESS NOTE ADULT - SUBJECTIVE AND OBJECTIVE BOX
FLEX STRINGER  MRN#:  [id number]    The patient is a 67y Male who was seen, evaluated, & examined with the CTICU staff on rounds and later in the day with a multidisciplinary care plan formulated & implemented.  All available clinical, laboratory, radiographic, pharmacologic, and electrocardiographic data were reviewed & analyzed.      The patient was in the CTICU in critical condition secondary to respiratory failure-persistent cardiopulmonary dysfunction, hemodynamically significant anemia/hypovolemia-shock, resolved mediastinal bleeding, persistent cardiovascular dysfunction, and stress hyperglycemia.      Respiratory status required supplemental oxygen, the following of ABG’s with A-line monitoring, and continuous pulse oximetry monitoring for support & to evaluate for & prevent further decompensation secondary to persistent cardiopulmonary dysfunction.     Invasive hemodynamic monitoring with an A-line was required for the following of continuous MAP/BP monitoring to ensure adequate cardiovascular support and to evaluate for & help prevent decompensation while receiving intermittent volume expansion, blood transfusion, and an IV Primacor drip secondary to hemodynamically significant anemia/hypovolemia-shock, cardiovascular dysfunction, acute postoperative blood loss anemia, and resolved bleeding.       Metabolic stability, uncontrolled type II Diabetes-hyperglycemia, & infection prophylaxis required an IV regular Insulin drip, stat dose of NPH, Lantus, pre-meal regular Insulin, & the following of serial glucose levels to help achieve & maintain euglycemia.      Patient required more than the usual postoperative critical care management and I provided 110 minutes of non-continuous care to the patient.  Discussed at length with the CTICU staff and helped coordinate care.

## 2017-08-03 LAB
ALBUMIN SERPL ELPH-MCNC: 3 G/DL — LOW (ref 3.3–5)
ALP SERPL-CCNC: 200 U/L — HIGH (ref 40–120)
ALT FLD-CCNC: 16 U/L RC — SIGNIFICANT CHANGE UP (ref 10–45)
ANION GAP SERPL CALC-SCNC: 13 MMOL/L — SIGNIFICANT CHANGE UP (ref 5–17)
APTT BLD: 29.2 SEC — SIGNIFICANT CHANGE UP (ref 27.5–37.4)
AST SERPL-CCNC: 31 U/L — SIGNIFICANT CHANGE UP (ref 10–40)
BILIRUB SERPL-MCNC: 1 MG/DL — SIGNIFICANT CHANGE UP (ref 0.2–1.2)
BUN SERPL-MCNC: 24 MG/DL — HIGH (ref 7–23)
CALCIUM SERPL-MCNC: 8.2 MG/DL — LOW (ref 8.4–10.5)
CHLORIDE SERPL-SCNC: 101 MMOL/L — SIGNIFICANT CHANGE UP (ref 96–108)
CO2 SERPL-SCNC: 21 MMOL/L — LOW (ref 22–31)
CREAT SERPL-MCNC: 1.26 MG/DL — SIGNIFICANT CHANGE UP (ref 0.5–1.3)
GAS PNL BLDA: SIGNIFICANT CHANGE UP
GLUCOSE SERPL-MCNC: 207 MG/DL — HIGH (ref 70–99)
HCT VFR BLD CALC: 33.6 % — LOW (ref 39–50)
HGB BLD-MCNC: 11.3 G/DL — LOW (ref 13–17)
INR BLD: 1.1 RATIO — SIGNIFICANT CHANGE UP (ref 0.88–1.16)
MCHC RBC-ENTMCNC: 29.9 PG — SIGNIFICANT CHANGE UP (ref 27–34)
MCHC RBC-ENTMCNC: 33.5 GM/DL — SIGNIFICANT CHANGE UP (ref 32–36)
MCV RBC AUTO: 89.4 FL — SIGNIFICANT CHANGE UP (ref 80–100)
NT-PROBNP SERPL-SCNC: 4947 PG/ML — HIGH (ref 0–300)
PHOSPHATE SERPL-MCNC: 3.9 MG/DL — SIGNIFICANT CHANGE UP (ref 2.5–4.5)
PLATELET # BLD AUTO: 188 K/UL — SIGNIFICANT CHANGE UP (ref 150–400)
POTASSIUM SERPL-MCNC: 4.6 MMOL/L — SIGNIFICANT CHANGE UP (ref 3.5–5.3)
POTASSIUM SERPL-SCNC: 4.6 MMOL/L — SIGNIFICANT CHANGE UP (ref 3.5–5.3)
PROT SERPL-MCNC: 6.1 G/DL — SIGNIFICANT CHANGE UP (ref 6–8.3)
PROTHROM AB SERPL-ACNC: 12 SEC — SIGNIFICANT CHANGE UP (ref 9.8–12.7)
RBC # BLD: 3.76 M/UL — LOW (ref 4.2–5.8)
RBC # FLD: 16.9 % — HIGH (ref 10.3–14.5)
SODIUM SERPL-SCNC: 135 MMOL/L — SIGNIFICANT CHANGE UP (ref 135–145)
WBC # BLD: 11.2 K/UL — HIGH (ref 3.8–10.5)
WBC # FLD AUTO: 11.2 K/UL — HIGH (ref 3.8–10.5)

## 2017-08-03 PROCEDURE — 71010: CPT | Mod: 26

## 2017-08-03 RX ORDER — INSULIN LISPRO 100/ML
6 VIAL (ML) SUBCUTANEOUS
Qty: 0 | Refills: 0 | Status: DISCONTINUED | OUTPATIENT
Start: 2017-08-03 | End: 2017-08-05

## 2017-08-03 RX ORDER — METOPROLOL TARTRATE 50 MG
5 TABLET ORAL ONCE
Qty: 0 | Refills: 0 | Status: COMPLETED | OUTPATIENT
Start: 2017-08-03 | End: 2017-08-03

## 2017-08-03 RX ORDER — POTASSIUM CHLORIDE 20 MEQ
20 PACKET (EA) ORAL EVERY 12 HOURS
Qty: 0 | Refills: 0 | Status: COMPLETED | OUTPATIENT
Start: 2017-08-03 | End: 2017-08-04

## 2017-08-03 RX ORDER — ACETAMINOPHEN 500 MG
1000 TABLET ORAL ONCE
Qty: 0 | Refills: 0 | Status: COMPLETED | OUTPATIENT
Start: 2017-08-03 | End: 2017-08-03

## 2017-08-03 RX ORDER — FUROSEMIDE 40 MG
40 TABLET ORAL EVERY 12 HOURS
Qty: 0 | Refills: 0 | Status: COMPLETED | OUTPATIENT
Start: 2017-08-03 | End: 2017-08-04

## 2017-08-03 RX ORDER — METOPROLOL TARTRATE 50 MG
75 TABLET ORAL EVERY 12 HOURS
Qty: 0 | Refills: 0 | Status: DISCONTINUED | OUTPATIENT
Start: 2017-08-03 | End: 2017-08-11

## 2017-08-03 RX ORDER — METOPROLOL TARTRATE 50 MG
50 TABLET ORAL EVERY 12 HOURS
Qty: 0 | Refills: 0 | Status: DISCONTINUED | OUTPATIENT
Start: 2017-08-03 | End: 2017-08-03

## 2017-08-03 RX ORDER — INSULIN HUMAN 100 [IU]/ML
2 INJECTION, SOLUTION SUBCUTANEOUS
Qty: 100 | Refills: 0 | Status: DISCONTINUED | OUTPATIENT
Start: 2017-08-03 | End: 2017-08-04

## 2017-08-03 RX ORDER — METOPROLOL TARTRATE 50 MG
25 TABLET ORAL ONCE
Qty: 0 | Refills: 0 | Status: COMPLETED | OUTPATIENT
Start: 2017-08-03 | End: 2017-08-03

## 2017-08-03 RX ADMIN — Medication 400 MILLIGRAM(S): at 08:10

## 2017-08-03 RX ADMIN — Medication 2.5 MILLIGRAM(S): at 17:21

## 2017-08-03 RX ADMIN — ATORVASTATIN CALCIUM 80 MILLIGRAM(S): 80 TABLET, FILM COATED ORAL at 21:52

## 2017-08-03 RX ADMIN — Medication 81 MILLIGRAM(S): at 12:09

## 2017-08-03 RX ADMIN — INSULIN HUMAN 2 UNIT(S)/HR: 100 INJECTION, SOLUTION SUBCUTANEOUS at 07:00

## 2017-08-03 RX ADMIN — Medication 5 MILLIGRAM(S): at 06:42

## 2017-08-03 RX ADMIN — Medication 6 UNIT(S): at 17:43

## 2017-08-03 RX ADMIN — Medication 100 MILLIGRAM(S): at 00:55

## 2017-08-03 RX ADMIN — Medication 75 MILLIGRAM(S): at 17:21

## 2017-08-03 RX ADMIN — PANTOPRAZOLE SODIUM 40 MILLIGRAM(S): 20 TABLET, DELAYED RELEASE ORAL at 12:09

## 2017-08-03 RX ADMIN — Medication 40 MILLIGRAM(S): at 23:24

## 2017-08-03 RX ADMIN — INSULIN GLARGINE 24 UNIT(S): 100 INJECTION, SOLUTION SUBCUTANEOUS at 21:51

## 2017-08-03 RX ADMIN — SODIUM CHLORIDE 10 MILLILITER(S): 9 INJECTION INTRAMUSCULAR; INTRAVENOUS; SUBCUTANEOUS at 07:00

## 2017-08-03 RX ADMIN — Medication 20 MILLIGRAM(S): at 00:55

## 2017-08-03 RX ADMIN — Medication 20 MILLIEQUIVALENT(S): at 23:25

## 2017-08-03 RX ADMIN — Medication 0.25 MILLIGRAM(S): at 03:25

## 2017-08-03 RX ADMIN — Medication 25 MILLIGRAM(S): at 10:55

## 2017-08-03 RX ADMIN — Medication 1000 MILLIGRAM(S): at 08:40

## 2017-08-03 RX ADMIN — ENOXAPARIN SODIUM 40 MILLIGRAM(S): 100 INJECTION SUBCUTANEOUS at 12:09

## 2017-08-03 RX ADMIN — Medication 20 MILLIEQUIVALENT(S): at 10:55

## 2017-08-03 RX ADMIN — Medication 0.12 MILLIGRAM(S): at 05:35

## 2017-08-03 RX ADMIN — Medication 25 MILLIGRAM(S): at 05:35

## 2017-08-03 RX ADMIN — INSULIN GLARGINE 24 UNIT(S): 100 INJECTION, SOLUTION SUBCUTANEOUS at 00:55

## 2017-08-03 RX ADMIN — Medication 40 MILLIGRAM(S): at 10:55

## 2017-08-03 NOTE — PROGRESS NOTE ADULT - SUBJECTIVE AND OBJECTIVE BOX
Operation C2L, EF40%, pre op IABP, RTOR for exploration for bleeding   POD: 3  Narrative: Patient seen and examined, extubated, in cardiogenic shock, requiring pressors and inotropic support. Pt s/p CABG, preop IABP now d/c'd. Post op requiring blood products for mediastinal bleeding.     Vital Signs Last 24 Hrs  T(C): 36.8 (02 Aug 2017 20:00), Max: 36.8 (02 Aug 2017 04:00)  T(F): 98.2 (02 Aug 2017 20:00), Max: 98.2 (02 Aug 2017 04:00)  HR: 98 (03 Aug 2017 01:00) (93 - 124)  BP: 132/76 (03 Aug 2017 01:00) (129/74 - 140/68)  BP(mean): 98 (03 Aug 2017 01:00) (85 - 98)  RR: 33 (03 Aug 2017 01:00) (10 - 41)  SpO2: 100% (03 Aug 2017 01:00) (99% - 100%)    08-01 @ 07:01  -  08-02 @ 07:00  --------------------------------------------------------  IN:    Albumin 5%  - 250 mL: 250 mL    DOBUTamine Infusion: 102 mL    DOBUTamine Infusion: 13.5 mL    DOBUTamine Infusion: 67.6 mL    FFP (Fresh Frozen Plasma): 300 mL    insulin Infusion: 61.5 mL    insulin Infusion: 10 mL    IV PiggyBack: 250 mL    norepinephrine Infusion: 3 mL    norepinephrine Infusion: 8 mL    Packed Red Blood Cells: 250 mL    propofol Infusion: 82.8 mL    sodium chloride 0.45%: 10 mL    sodium chloride 0.9%.: 210 mL    vasopressin Infusion: 4 mL  Total IN: 1622.4 mL    OUT:    Chest Tube: 235 mL    Chest Tube: 690 mL    Indwelling Catheter - Urethral: 1695 mL  Total OUT: 2620 mL    Total NET: -997.6 mL      08-02 @ 07:01  -  08-03 @ 02:06  --------------------------------------------------------  IN:    Albumin 5%  - 250 mL: 250 mL    insulin Infusion: 35 mL    IV PiggyBack: 350 mL    milrinone  Infusion: 121.2 mL    niCARdipine Infusion: 3 mL    Oral Fluid: 360 mL    Packed Red Blood Cells: 250 mL    propofol Infusion: 17.8 mL    sodium chloride 0.9%.: 190 mL  Total IN: 1577 mL    OUT:    Chest Tube: 90 mL    Chest Tube: 180 mL    Indwelling Catheter - Urethral: 565 mL  Total OUT: 835 mL    Total NET: 742 mL    LABS:    MEDICATIONS  (STANDING):  sodium chloride 0.9%. 1000 milliLiter(s) (10 mL/Hr) IV Continuous <Continuous>  pantoprazole  Injectable 40 milliGRAM(s) IV Push daily  aspirin enteric coated 81 milliGRAM(s) Oral daily  enoxaparin Injectable 40 milliGRAM(s) SubCutaneous daily  atorvastatin 80 milliGRAM(s) Oral at bedtime  dextrose 5%. 1000 milliLiter(s) (50 mL/Hr) IV Continuous <Continuous>  dextrose 50% Injectable 12.5 Gram(s) IV Push once  dextrose 50% Injectable 25 Gram(s) IV Push once  dextrose 50% Injectable 25 Gram(s) IV Push once  insulin glargine Injectable (LANTUS) 24 Unit(s) SubCutaneous at bedtime  insulin lispro (HumaLOG) corrective regimen sliding scale   SubCutaneous three times a day before meals  insulin lispro (HumaLOG) corrective regimen sliding scale   SubCutaneous at bedtime  digoxin     Tablet 0.125 milliGRAM(s) Oral daily  digoxin  Injectable 0.25 milliGRAM(s) IV Push every 6 hours  metoprolol 25 milliGRAM(s) Oral two times a day    MEDICATIONS  (PRN):  oxyCODONE    5 mG/acetaminophen 325 mG 1 Tablet(s) Oral every 4 hours PRN Mild Pain  oxyCODONE    5 mG/acetaminophen 325 mG 2 Tablet(s) Oral every 6 hours PRN Moderate Pain  dextrose Gel 1 Dose(s) Oral once PRN Blood Glucose LESS THAN 70 milliGRAM(s)/deciLiter  glucagon  Injectable 1 milliGRAM(s) IntraMuscular once PRN Glucose <70 milliGRAM(s)/deciLiter    General: sedated, extubated   Chest: sternal dressing C/D/I  Heart: S1, S2, RRR  Lungs: CTA B/L, without W/R/R  Abdomen: Soft, ND, NT, positive BS  Extremities: no LE edema, + DP pulses B/L     Does the patient have a history of CHF: yes  -If yes, systolic or diastolic: ys  -pre-operative EF: 30%    Extubation within 24 hours:  no    Does the patient have a history of kidney disease:  no  -give CKD stage if applicable:  -what is patient's baseline Creatinine:    Beta Blockers contraindicated for the first 24 hours due to vasopressor/inotropic medication: No  -If on pressors, indication:     Did the patient receive transfusion of blood and/or products:  -If yes, indication: yes , acute blood loss post op     DVT PPX: venodynes B/L, Lovenox    Lisa-operative antibiotics discontinued within 48 hours of CTU admission: Yes   -name/date/time of discontinue: Gladys    RADIOLOGY & ADDITIONAL TESTS:    Patient care discussed on morning interdisciplinary rounds with CTS team.

## 2017-08-03 NOTE — DIETITIAN INITIAL EVALUATION ADULT. - NS AS NUTRI INTERV ED CONTENT
Other (specify)/Unable to provide verbal education at this time. Left T2DM and heart healthy eating nutrition therapy handouts at bedside. RD remains available to monitor PO intake, wt, labs and education when Pt amenable.

## 2017-08-03 NOTE — PROGRESS NOTE ADULT - ASSESSMENT
A/P C2L, EF 40%, pre op IABP/RTOR for bleeding exploration. IABP d/c'd   Cont asa, statin, BB, GI/DVT prophylaxis, keep K>4, mag >2.0, pain management, BB once HR resolves, glycemic control, Physical therapy follow up , D/C chest tube per protocol  Monitor chest tube drainage

## 2017-08-03 NOTE — DIETITIAN INITIAL EVALUATION ADULT. - PT NOT SOURCE
other (specify)/Pt is Tamil speaking only. Pt seen sleeping in chair, Pt refusing  phone. No family at bedside. Pt uninterested in RD interview at this time. Limited information collected.

## 2017-08-03 NOTE — DIETITIAN INITIAL EVALUATION ADULT. - OTHER INFO
Pt seen for LOS in CTU. Pt refusing  phone and no family at bedside to collected subjective information from. Per chart, Pt with hgba1c of 10.2%, likely poor dietary compliance. T2DM and heart healthy eating nutrition therapy handouts left at bedside. Per H&P Pt was takes FeSO4, Lantus PTA. No documented GI Distress. No documented chewing/swallowing difficulty. THONYKA

## 2017-08-03 NOTE — DIETITIAN INITIAL EVALUATION ADULT. - ENERGY NEEDS
Ht: 5'6", (dosing)Wt: 90.1kg, BMI: 31.9kg/m,  IBW: 142lbs(+/-10%), 139%IBW  Pertinent information: Pt admitted to OSH for Acute CHF exacerbation and found with triple vessel disease. Pt transferred fro [facility] fro CABG eval. Pt S/p CABG x2 POD #3, and return to OR for Blood clot evacuation. Extubated 8/2  +1 generalized and isabelle foot/leg Edema. No pressure injuries

## 2017-08-03 NOTE — PROGRESS NOTE ADULT - SUBJECTIVE AND OBJECTIVE BOX
Chief complaint  Patient is a 67y old  Male who presents with a chief complaint of I need heart surgery (28 Jul 2017 00:49)   Review of systems  Patient in bed, comfortable, no fever, no hypoglycemia.    Labs and Fingersticks    CAPILLARY BLOOD GLUCOSE  93 (03 Aug 2017 15:30)  84 (03 Aug 2017 15:00)  120 (03 Aug 2017 14:00)  136 (03 Aug 2017 13:00)  139 (03 Aug 2017 12:00)  136 (03 Aug 2017 11:00)  132 (03 Aug 2017 10:00)  121 (03 Aug 2017 09:00)  176 (03 Aug 2017 08:00)  142 (03 Aug 2017 07:00)  202 (03 Aug 2017 05:00)  172 (02 Aug 2017 22:00)  93 (02 Aug 2017 19:30)  93 (02 Aug 2017 19:00)  101 (02 Aug 2017 18:00)  104 (02 Aug 2017 17:00)  115 (02 Aug 2017 16:00)  121 (02 Aug 2017 15:00)  133 (02 Aug 2017 14:00)  136 (02 Aug 2017 13:00)  151 (02 Aug 2017 12:00)  152 (02 Aug 2017 11:00)  156 (02 Aug 2017 10:00)  160 (02 Aug 2017 09:00)  160 (02 Aug 2017 08:00)  116 (02 Aug 2017 07:00)  95 (02 Aug 2017 06:00)  95 (02 Aug 2017 05:30)  98 (02 Aug 2017 05:00)  101 (02 Aug 2017 04:00)  121 (02 Aug 2017 03:00)  130 (02 Aug 2017 02:00)  137 (02 Aug 2017 01:00)  145 (02 Aug 2017 00:00)  154 (01 Aug 2017 23:00)  169 (01 Aug 2017 22:00)  170 (01 Aug 2017 21:00)  133 (01 Aug 2017 20:00)  101 (01 Aug 2017 19:00)  92 (01 Aug 2017 18:30)  91 (01 Aug 2017 18:00)  108 (01 Aug 2017 17:00)  114 (01 Aug 2017 16:00)  143 (01 Aug 2017 15:00)  148 (01 Aug 2017 14:00)  177 (01 Aug 2017 12:45)  167 (01 Aug 2017 11:30)  173 (01 Aug 2017 08:00)  163 (01 Aug 2017 07:00)  164 (01 Aug 2017 06:00)  160 (01 Aug 2017 05:00)  166 (01 Aug 2017 04:00)  173 (01 Aug 2017 03:00)  193 (01 Aug 2017 02:00)  168 (01 Aug 2017 01:00)  200 (01 Aug 2017 00:00)  127 (31 Jul 2017 23:00)  116 (31 Jul 2017 22:00)    Anion Gap, Serum: 13 (08-03 @ 04:44)  Anion Gap, Serum: 11 (08-02 @ 17:08)  Anion Gap, Serum: 12 (08-02 @ 01:22)      Calcium, Total Serum: 8.2 <L> (08-03 @ 04:44)  Calcium, Total Serum: 8.3 <L> (08-02 @ 17:08)  Calcium, Total Serum: 8.1 <L> (08-02 @ 01:22)  Albumin, Serum: 3.0 <L> (08-03 @ 04:44)  Albumin, Serum: 3.0 <L> (08-02 @ 17:08)  Albumin, Serum: 3.1 <L> (08-02 @ 01:22)    Alanine Aminotransferase (ALT/SGPT): 16 (08-03 @ 04:44)  Alanine Aminotransferase (ALT/SGPT): 13 (08-02 @ 17:08)  Alanine Aminotransferase (ALT/SGPT): 15 (08-02 @ 01:22)  Alkaline Phosphatase, Serum: 200 <H> (08-03 @ 04:44)  Alkaline Phosphatase, Serum: 54 (08-02 @ 17:08)  Alkaline Phosphatase, Serum: 52 (08-02 @ 01:22)  Aspartate Aminotransferase (AST/SGOT): 31 (08-03 @ 04:44)  Aspartate Aminotransferase (AST/SGOT): 25 (08-02 @ 17:08)  Aspartate Aminotransferase (AST/SGOT): 35 (08-02 @ 01:22)        08-03    135  |  101  |  24<H>  ----------------------------<  207<H>  4.6   |  21<L>  |  1.26    Ca    8.2<L>      03 Aug 2017 04:44  Phos  3.9     08-03  Mg     2.0     08-02    TPro  6.1  /  Alb  3.0<L>  /  TBili  1.0  /  DBili  x   /  AST  31  /  ALT  16  /  AlkPhos  200<H>  08-03                        11.3   11.2  )-----------( 188      ( 03 Aug 2017 04:44 )             33.6     Medications  MEDICATIONS  (STANDING):  sodium chloride 0.9%. 1000 milliLiter(s) (10 mL/Hr) IV Continuous <Continuous>  pantoprazole  Injectable 40 milliGRAM(s) IV Push daily  aspirin enteric coated 81 milliGRAM(s) Oral daily  enoxaparin Injectable 40 milliGRAM(s) SubCutaneous daily  atorvastatin 80 milliGRAM(s) Oral at bedtime  dextrose 5%. 1000 milliLiter(s) (50 mL/Hr) IV Continuous <Continuous>  dextrose 50% Injectable 12.5 Gram(s) IV Push once  dextrose 50% Injectable 25 Gram(s) IV Push once  dextrose 50% Injectable 25 Gram(s) IV Push once  insulin glargine Injectable (LANTUS) 24 Unit(s) SubCutaneous at bedtime  insulin lispro (HumaLOG) corrective regimen sliding scale   SubCutaneous three times a day before meals  insulin lispro (HumaLOG) corrective regimen sliding scale   SubCutaneous at bedtime  digoxin     Tablet 0.125 milliGRAM(s) Oral daily  insulin Infusion 2 Unit(s)/Hr (2 mL/Hr) IV Continuous <Continuous>  furosemide   Injectable 40 milliGRAM(s) IV Push every 12 hours  potassium chloride    Tablet ER 20 milliEquivalent(s) Oral every 12 hours  insulin lispro Injectable (HumaLOG) 6 Unit(s) SubCutaneous before breakfast  insulin lispro Injectable (HumaLOG) 6 Unit(s) SubCutaneous before lunch  insulin lispro Injectable (HumaLOG) 6 Unit(s) SubCutaneous before dinner  enalapril 2.5 milliGRAM(s) Oral two times a day  metoprolol 75 milliGRAM(s) Oral every 12 hours      Physical Exam  General: Patient comfortable in bed  Vital Signs Last 12 Hrs  T(F): 99.3 (08-03-17 @ 16:00), Max: 99.7 (08-03-17 @ 08:00)  HR: 88 (08-03-17 @ 17:20) (79 - 95)  BP: 138/79 (08-03-17 @ 17:20) (134/71 - 157/85)  BP(mean): 102 (08-03-17 @ 17:20) (97 - 115)  RR: 25 (08-03-17 @ 17:20) (21 - 27)  SpO2: 97% (08-03-17 @ 17:20) (95% - 100%)  Neck: No palpable thyroid nodules.  CVS: S1S2, No murmurs  Respiratory: No wheezing, no crepitations  GI: Abdomen soft, bowel sounds positive  Musculoskeletal: Positive edema lower extremities bilaterally  Skin: No skin rashes, no ecchimosis    Diagnostics    Thyroid Stimulating Hormone, Serum: AM Sched. Collection: 29-Jul-2017 06:00 (07-28 @ 11:45)  Free Thyroxine, Serum: AM Sched. Collection: 29-Jul-2017 06:00 (07-28 @ 11:45)

## 2017-08-03 NOTE — PHYSICAL THERAPY INITIAL EVALUATION ADULT - ADDITIONAL COMMENTS
Pt lives in an apartment +elevator access with wife and daughter. Prior to admission pt states wife and daughter assisted him with ADLs and used a straight cane for ambulation.

## 2017-08-03 NOTE — PHYSICAL THERAPY INITIAL EVALUATION ADULT - GENERAL OBSERVATIONS, REHAB EVAL
Pt received seated in chair +external pacer, BP cuff, continuous pulse ox monitor, dodson catheter, chest tube, venodynes, in NAD, agreeable to PT initial evaluation,  services used throughout session

## 2017-08-03 NOTE — PHYSICAL THERAPY INITIAL EVALUATION ADULT - PERTINENT HX OF CURRENT PROBLEM, REHAB EVAL
67M transferred from [facility] after cardiac cath in right femoral approach revealed triple vessel disease for cardiac surgery evaluation. Now asymptomatic. Femostop intact in R groin s/p cardiac cath, s/p sheath removal @ 2130.

## 2017-08-03 NOTE — PHYSICAL THERAPY INITIAL EVALUATION ADULT - ACTIVE RANGE OF MOTION EXAMINATION, REHAB EVAL
sternal precautions maintained/bilateral  lower extremity Active ROM was WFL (within functional limits)/bilateral upper extremity Active ROM was WFL (within functional limits)

## 2017-08-03 NOTE — DIETITIAN INITIAL EVALUATION ADULT. - NS AS NUTRI INTERV FEED ASSISTANCE
Other (specify)/1. Provide food preferences as requested by Pt/family within diet restrictions  2. Encourage PO intake during meal times

## 2017-08-03 NOTE — PROGRESS NOTE ADULT - SUBJECTIVE AND OBJECTIVE BOX
Cardiovascular Disease Progress Note    Overnight events: No acute events overnight. Mr. Lundberg was extubated yesterday. He denies chest pain or SOB.   Otherwise review of systems negative    Objective Findings:  T(C): 37.6 (17 @ 08:00), Max: 37.6 (17 @ 08:00)  HR: 83 (17 @ 09:00) (83 - 124)  BP: 148/78 (17 @ 09:00) (130/69 - 152/78)  RR: 21 (17 @ 09:00) (21 - 41)  SpO2: 99% (17 @ 09:00) (96% - 100%)  Wt(kg): --  Daily     Daily Weight in k.1 (03 Aug 2017 08:50)      Physical Exam:  Gen: NAD  HEENT: EOMI  CV: RRR, normal S1 + S2, no m/r/g  Lungs: Mild crackles b/l  Abd: soft, non-tender  Ext: No edema    Telemetry: Sinus; Rare PVDs. Transient a-fib yesterday afternoon.     Laboratory Data:                        11.3   11.2  )-----------( 188      ( 03 Aug 2017 04:44 )             33.6     08-    135  |  101  |  24<H>  ----------------------------<  207<H>  4.6   |  21<L>  |  1.26    Ca    8.2<L>      03 Aug 2017 04:44  Phos  3.9       Mg     2.0         TPro  6.1  /  Alb  3.0<L>  /  TBili  1.0  /  DBili  x   /  AST  31  /  ALT  16  /  AlkPhos  200<H>      PT/INR - ( 03 Aug 2017 04:44 )   PT: 12.0 sec;   INR: 1.10 ratio         PTT - ( 03 Aug 2017 04:44 )  PTT:29.2 sec          Inpatient Medications:  MEDICATIONS  (STANDING):  sodium chloride 0.9%. 1000 milliLiter(s) (10 mL/Hr) IV Continuous <Continuous>  pantoprazole  Injectable 40 milliGRAM(s) IV Push daily  aspirin enteric coated 81 milliGRAM(s) Oral daily  enoxaparin Injectable 40 milliGRAM(s) SubCutaneous daily  atorvastatin 80 milliGRAM(s) Oral at bedtime  dextrose 5%. 1000 milliLiter(s) (50 mL/Hr) IV Continuous <Continuous>  dextrose 50% Injectable 12.5 Gram(s) IV Push once  dextrose 50% Injectable 25 Gram(s) IV Push once  dextrose 50% Injectable 25 Gram(s) IV Push once  insulin glargine Injectable (LANTUS) 24 Unit(s) SubCutaneous at bedtime  insulin lispro (HumaLOG) corrective regimen sliding scale   SubCutaneous three times a day before meals  insulin lispro (HumaLOG) corrective regimen sliding scale   SubCutaneous at bedtime  digoxin     Tablet 0.125 milliGRAM(s) Oral daily  metoprolol 25 milliGRAM(s) Oral two times a day  insulin Infusion 2 Unit(s)/Hr (2 mL/Hr) IV Continuous <Continuous>      Assessment: 67 year old man with uncontrolled DM and multivessel CAD now s/p CABG    #Multivessel CAD- s/p 2V CABG with LIMA to LAD and SVG to OM.  Tolerating ASA, beta-blocker and high intensity statin.     #Cardiogenic shock- Now off milrinone and levophed. IABP removed .     #Respiratory failure- extubated yesterday. Saturating well on NC.      #Acute on chronic systolic CHF- now off milrinone. Lasix prn.    #Uncontrolled DM II- insulin gtt. Endo input appreciated    Rest of care as per CT surgery team.       Over 35 minutes spent on total encounter; more than 50% of the visit was spent counseling and/or coordinating care by the attending physician.      Brayan Devlin M.D.   Cardiovascular Disease  [phone]

## 2017-08-04 LAB
ALBUMIN SERPL ELPH-MCNC: 3.2 G/DL — LOW (ref 3.3–5)
ALP SERPL-CCNC: 185 U/L — HIGH (ref 40–120)
ALT FLD-CCNC: 17 U/L RC — SIGNIFICANT CHANGE UP (ref 10–45)
ANION GAP SERPL CALC-SCNC: 11 MMOL/L — SIGNIFICANT CHANGE UP (ref 5–17)
APTT BLD: 29.9 SEC — SIGNIFICANT CHANGE UP (ref 27.5–37.4)
AST SERPL-CCNC: 28 U/L — SIGNIFICANT CHANGE UP (ref 10–40)
BILIRUB SERPL-MCNC: 0.9 MG/DL — SIGNIFICANT CHANGE UP (ref 0.2–1.2)
BUN SERPL-MCNC: 24 MG/DL — HIGH (ref 7–23)
CALCIUM SERPL-MCNC: 8.4 MG/DL — SIGNIFICANT CHANGE UP (ref 8.4–10.5)
CHLORIDE SERPL-SCNC: 98 MMOL/L — SIGNIFICANT CHANGE UP (ref 96–108)
CO2 SERPL-SCNC: 25 MMOL/L — SIGNIFICANT CHANGE UP (ref 22–31)
CREAT SERPL-MCNC: 1.04 MG/DL — SIGNIFICANT CHANGE UP (ref 0.5–1.3)
GLUCOSE SERPL-MCNC: 170 MG/DL — HIGH (ref 70–99)
HCT VFR BLD CALC: 36.5 % — LOW (ref 39–50)
HGB BLD-MCNC: 12.1 G/DL — LOW (ref 13–17)
INR BLD: 1.08 RATIO — SIGNIFICANT CHANGE UP (ref 0.88–1.16)
MCHC RBC-ENTMCNC: 29.9 PG — SIGNIFICANT CHANGE UP (ref 27–34)
MCHC RBC-ENTMCNC: 33.1 GM/DL — SIGNIFICANT CHANGE UP (ref 32–36)
MCV RBC AUTO: 90.3 FL — SIGNIFICANT CHANGE UP (ref 80–100)
PHOSPHATE SERPL-MCNC: 2.7 MG/DL — SIGNIFICANT CHANGE UP (ref 2.5–4.5)
PLATELET # BLD AUTO: 235 K/UL — SIGNIFICANT CHANGE UP (ref 150–400)
POTASSIUM SERPL-MCNC: 4.2 MMOL/L — SIGNIFICANT CHANGE UP (ref 3.5–5.3)
POTASSIUM SERPL-SCNC: 4.2 MMOL/L — SIGNIFICANT CHANGE UP (ref 3.5–5.3)
PROT SERPL-MCNC: 6.7 G/DL — SIGNIFICANT CHANGE UP (ref 6–8.3)
PROTHROM AB SERPL-ACNC: 11.7 SEC — SIGNIFICANT CHANGE UP (ref 9.8–12.7)
RBC # BLD: 4.04 M/UL — LOW (ref 4.2–5.8)
RBC # FLD: 17.4 % — HIGH (ref 10.3–14.5)
SODIUM SERPL-SCNC: 134 MMOL/L — LOW (ref 135–145)
WBC # BLD: 11.4 K/UL — HIGH (ref 3.8–10.5)
WBC # FLD AUTO: 11.4 K/UL — HIGH (ref 3.8–10.5)

## 2017-08-04 PROCEDURE — 71010: CPT | Mod: 26

## 2017-08-04 PROCEDURE — 99223 1ST HOSP IP/OBS HIGH 75: CPT | Mod: GC

## 2017-08-04 RX ORDER — SODIUM CHLORIDE 9 MG/ML
3 INJECTION INTRAMUSCULAR; INTRAVENOUS; SUBCUTANEOUS EVERY 8 HOURS
Qty: 0 | Refills: 0 | Status: DISCONTINUED | OUTPATIENT
Start: 2017-08-04 | End: 2017-08-11

## 2017-08-04 RX ADMIN — Medication 6 UNIT(S): at 17:25

## 2017-08-04 RX ADMIN — Medication 75 MILLIGRAM(S): at 06:26

## 2017-08-04 RX ADMIN — Medication 81 MILLIGRAM(S): at 12:52

## 2017-08-04 RX ADMIN — Medication 75 MILLIGRAM(S): at 17:25

## 2017-08-04 RX ADMIN — Medication 20 MILLIEQUIVALENT(S): at 12:50

## 2017-08-04 RX ADMIN — PANTOPRAZOLE SODIUM 40 MILLIGRAM(S): 20 TABLET, DELAYED RELEASE ORAL at 12:51

## 2017-08-04 RX ADMIN — Medication 2.5 MILLIGRAM(S): at 06:27

## 2017-08-04 RX ADMIN — Medication 2.5 MILLIGRAM(S): at 17:25

## 2017-08-04 RX ADMIN — INSULIN GLARGINE 24 UNIT(S): 100 INJECTION, SOLUTION SUBCUTANEOUS at 21:52

## 2017-08-04 RX ADMIN — Medication 20 MILLIEQUIVALENT(S): at 23:33

## 2017-08-04 RX ADMIN — SODIUM CHLORIDE 3 MILLILITER(S): 9 INJECTION INTRAMUSCULAR; INTRAVENOUS; SUBCUTANEOUS at 22:00

## 2017-08-04 RX ADMIN — Medication 6 UNIT(S): at 08:47

## 2017-08-04 RX ADMIN — ATORVASTATIN CALCIUM 80 MILLIGRAM(S): 80 TABLET, FILM COATED ORAL at 21:48

## 2017-08-04 RX ADMIN — SODIUM CHLORIDE 3 MILLILITER(S): 9 INJECTION INTRAMUSCULAR; INTRAVENOUS; SUBCUTANEOUS at 14:16

## 2017-08-04 RX ADMIN — Medication 6 UNIT(S): at 12:52

## 2017-08-04 RX ADMIN — Medication 0.12 MILLIGRAM(S): at 06:26

## 2017-08-04 RX ADMIN — Medication 40 MILLIGRAM(S): at 23:33

## 2017-08-04 RX ADMIN — Medication 40 MILLIGRAM(S): at 12:50

## 2017-08-04 RX ADMIN — ENOXAPARIN SODIUM 40 MILLIGRAM(S): 100 INJECTION SUBCUTANEOUS at 12:51

## 2017-08-04 RX ADMIN — Medication 2: at 12:52

## 2017-08-04 NOTE — PROGRESS NOTE ADULT - SUBJECTIVE AND OBJECTIVE BOX
Cardiovascular Disease Progress Note    Overnight events: No acute events overnight. Mr. Lundberg feels well. He denies chest pain or SOB and is ambulating.   Otherwise review of systems negative    Objective Findings:  T(C): 37.6 (17 @ 04:00), Max: 37.7 (17 @ 20:00)  HR: 76 (17 @ 08:00) (76 - 88)  BP: 116/68 (17 @ 08:00) (110/76 - 157/85)  RR: 24 (17 @ 08:00) (21 - 28)  SpO2: 96% (17 @ 08:00) (94% - 98%)  Wt(kg): --  Daily     Daily Weight in k.5 (04 Aug 2017 00:00)      Physical Exam:  Gen: NAD  HEENT: EOMI  CV: RRR, normal S1 + S2, no m/r/g  Lungs: Crackles at bases b/l  Abd: soft, non-tender  Ext: No edema    Telemetry: Sinus; no ectopy    Laboratory Data:                        12.1   11.4  )-----------( 235      ( 04 Aug 2017 01:30 )             36.5     08-04    134<L>  |  98  |  24<H>  ----------------------------<  170<H>  4.2   |  25  |  1.04    Ca    8.4      04 Aug 2017 01:30  Phos  2.7     08-04    TPro  6.7  /  Alb  3.2<L>  /  TBili  0.9  /  DBili  x   /  AST  28  /  ALT  17  /  AlkPhos  185<H>  08-04    PT/INR - ( 04 Aug 2017 01:30 )   PT: 11.7 sec;   INR: 1.08 ratio         PTT - ( 04 Aug 2017 01:30 )  PTT:29.9 sec          Inpatient Medications:  MEDICATIONS  (STANDING):  sodium chloride 0.9%. 1000 milliLiter(s) (10 mL/Hr) IV Continuous <Continuous>  pantoprazole  Injectable 40 milliGRAM(s) IV Push daily  aspirin enteric coated 81 milliGRAM(s) Oral daily  enoxaparin Injectable 40 milliGRAM(s) SubCutaneous daily  atorvastatin 80 milliGRAM(s) Oral at bedtime  dextrose 5%. 1000 milliLiter(s) (50 mL/Hr) IV Continuous <Continuous>  dextrose 50% Injectable 12.5 Gram(s) IV Push once  dextrose 50% Injectable 25 Gram(s) IV Push once  dextrose 50% Injectable 25 Gram(s) IV Push once  insulin glargine Injectable (LANTUS) 24 Unit(s) SubCutaneous at bedtime  insulin lispro (HumaLOG) corrective regimen sliding scale   SubCutaneous three times a day before meals  insulin lispro (HumaLOG) corrective regimen sliding scale   SubCutaneous at bedtime  digoxin     Tablet 0.125 milliGRAM(s) Oral daily  furosemide   Injectable 40 milliGRAM(s) IV Push every 12 hours  potassium chloride    Tablet ER 20 milliEquivalent(s) Oral every 12 hours  insulin lispro Injectable (HumaLOG) 6 Unit(s) SubCutaneous before breakfast  insulin lispro Injectable (HumaLOG) 6 Unit(s) SubCutaneous before lunch  insulin lispro Injectable (HumaLOG) 6 Unit(s) SubCutaneous before dinner  enalapril 2.5 milliGRAM(s) Oral two times a day  metoprolol 75 milliGRAM(s) Oral every 12 hours  sodium chloride 0.9% lock flush 3 milliLiter(s) IV Push every 8 hours      Assessment: 67 year old man with uncontrolled DM and multivessel CAD now s/p CABG    #Multivessel CAD- s/p 2V CABG with LIMA to LAD and SVG to OM.  Tolerating ASA, beta-blocker and high intensity statin.     #Cardiogenic shock- Now off milrinone and levophed. IABP removed .     #Respiratory failure- extubated and saturating well on NC.      #Acute on chronic systolic CHF- now off milrinone. Lasix 40mg IV BID    #Uncontrolled DM II- basal/bolus insulin. Endo input appreciated        Over 35 minutes spent on total encounter; more than 50% of the visit was spent counseling and/or coordinating care by the attending physician.      Brayan Devlin M.D.   Cardiovascular Disease  [phone] Cardiovascular Disease Progress Note    Overnight events: No acute events overnight. Mr. Lundberg feels well. He denies chest pain or SOB and is ambulating.   Otherwise review of systems negative    Objective Findings:  T(C): 37.6 (17 @ 04:00), Max: 37.7 (17 @ 20:00)  HR: 76 (17 @ 08:00) (76 - 88)  BP: 116/68 (17 @ 08:00) (110/76 - 157/85)  RR: 24 (17 @ 08:00) (21 - 28)  SpO2: 96% (17 @ 08:00) (94% - 98%)  Wt(kg): --  Daily     Daily Weight in k.5 (04 Aug 2017 00:00)      Physical Exam:  Gen: NAD  HEENT: EOMI  CV: RRR, normal S1 + S2, no m/r/g  Lungs: Crackles at bases b/l  Abd: soft, non-tender  Ext: No edema    Telemetry: Sinus; no ectopy    Laboratory Data:                        12.1   11.4  )-----------( 235      ( 04 Aug 2017 01:30 )             36.5     08-04    134<L>  |  98  |  24<H>  ----------------------------<  170<H>  4.2   |  25  |  1.04    Ca    8.4      04 Aug 2017 01:30  Phos  2.7     08-04    TPro  6.7  /  Alb  3.2<L>  /  TBili  0.9  /  DBili  x   /  AST  28  /  ALT  17  /  AlkPhos  185<H>  08-04    PT/INR - ( 04 Aug 2017 01:30 )   PT: 11.7 sec;   INR: 1.08 ratio         PTT - ( 04 Aug 2017 01:30 )  PTT:29.9 sec          Inpatient Medications:  MEDICATIONS  (STANDING):  sodium chloride 0.9%. 1000 milliLiter(s) (10 mL/Hr) IV Continuous <Continuous>  pantoprazole  Injectable 40 milliGRAM(s) IV Push daily  aspirin enteric coated 81 milliGRAM(s) Oral daily  enoxaparin Injectable 40 milliGRAM(s) SubCutaneous daily  atorvastatin 80 milliGRAM(s) Oral at bedtime  dextrose 5%. 1000 milliLiter(s) (50 mL/Hr) IV Continuous <Continuous>  dextrose 50% Injectable 12.5 Gram(s) IV Push once  dextrose 50% Injectable 25 Gram(s) IV Push once  dextrose 50% Injectable 25 Gram(s) IV Push once  insulin glargine Injectable (LANTUS) 24 Unit(s) SubCutaneous at bedtime  insulin lispro (HumaLOG) corrective regimen sliding scale   SubCutaneous three times a day before meals  insulin lispro (HumaLOG) corrective regimen sliding scale   SubCutaneous at bedtime  digoxin     Tablet 0.125 milliGRAM(s) Oral daily  furosemide   Injectable 40 milliGRAM(s) IV Push every 12 hours  potassium chloride    Tablet ER 20 milliEquivalent(s) Oral every 12 hours  insulin lispro Injectable (HumaLOG) 6 Unit(s) SubCutaneous before breakfast  insulin lispro Injectable (HumaLOG) 6 Unit(s) SubCutaneous before lunch  insulin lispro Injectable (HumaLOG) 6 Unit(s) SubCutaneous before dinner  enalapril 2.5 milliGRAM(s) Oral two times a day  metoprolol 75 milliGRAM(s) Oral every 12 hours  sodium chloride 0.9% lock flush 3 milliLiter(s) IV Push every 8 hours      Assessment: 67 year old man with uncontrolled DM and multivessel CAD now s/p CABG    #Multivessel CAD- s/p 2V CABG with LIMA to LAD and SVG to OM.  Tolerating ASA, beta-blocker and high intensity statin.     #Cardiogenic shock- Now off milrinone and levophed. IABP removed .     #Respiratory failure- extubated and saturating well on NC.      #Acute on chronic systolic CHF- now off milrinone. Lasix 40mg IV BID. Low dose ACEi started.    #Uncontrolled DM II- basal/bolus insulin. Endo input appreciated        Over 35 minutes spent on total encounter; more than 50% of the visit was spent counseling and/or coordinating care by the attending physician.      Brayan Devlin M.D.   Cardiovascular Disease  [phone] Cardiovascular Disease Progress Note    Overnight events: No acute events overnight. Mr. Lundberg feels well. He denies chest pain or SOB and is ambulating.   Otherwise review of systems negative    Objective Findings:  T(C): 37.6 (17 @ 04:00), Max: 37.7 (17 @ 20:00)  HR: 76 (17 @ 08:00) (76 - 88)  BP: 116/68 (17 @ 08:00) (110/76 - 157/85)  RR: 24 (17 @ 08:00) (21 - 28)  SpO2: 96% (17 @ 08:00) (94% - 98%)  Wt(kg): --  Daily     Daily Weight in k.5 (04 Aug 2017 00:00)      Physical Exam:  Gen: NAD  HEENT: EOMI  CV: RRR, normal S1 + S2, no m/r/g  Lungs: Crackles at bases b/l  Abd: soft, non-tender  Ext: No edema    Telemetry: Sinus; rare PVDs    Laboratory Data:                        12.1   11.4  )-----------( 235      ( 04 Aug 2017 01:30 )             36.5     08-04    134<L>  |  98  |  24<H>  ----------------------------<  170<H>  4.2   |  25  |  1.04    Ca    8.4      04 Aug 2017 01:30  Phos  2.7     08-04    TPro  6.7  /  Alb  3.2<L>  /  TBili  0.9  /  DBili  x   /  AST  28  /  ALT  17  /  AlkPhos  185<H>  08-04    PT/INR - ( 04 Aug 2017 01:30 )   PT: 11.7 sec;   INR: 1.08 ratio         PTT - ( 04 Aug 2017 01:30 )  PTT:29.9 sec          Inpatient Medications:  MEDICATIONS  (STANDING):  sodium chloride 0.9%. 1000 milliLiter(s) (10 mL/Hr) IV Continuous <Continuous>  pantoprazole  Injectable 40 milliGRAM(s) IV Push daily  aspirin enteric coated 81 milliGRAM(s) Oral daily  enoxaparin Injectable 40 milliGRAM(s) SubCutaneous daily  atorvastatin 80 milliGRAM(s) Oral at bedtime  dextrose 5%. 1000 milliLiter(s) (50 mL/Hr) IV Continuous <Continuous>  dextrose 50% Injectable 12.5 Gram(s) IV Push once  dextrose 50% Injectable 25 Gram(s) IV Push once  dextrose 50% Injectable 25 Gram(s) IV Push once  insulin glargine Injectable (LANTUS) 24 Unit(s) SubCutaneous at bedtime  insulin lispro (HumaLOG) corrective regimen sliding scale   SubCutaneous three times a day before meals  insulin lispro (HumaLOG) corrective regimen sliding scale   SubCutaneous at bedtime  digoxin     Tablet 0.125 milliGRAM(s) Oral daily  furosemide   Injectable 40 milliGRAM(s) IV Push every 12 hours  potassium chloride    Tablet ER 20 milliEquivalent(s) Oral every 12 hours  insulin lispro Injectable (HumaLOG) 6 Unit(s) SubCutaneous before breakfast  insulin lispro Injectable (HumaLOG) 6 Unit(s) SubCutaneous before lunch  insulin lispro Injectable (HumaLOG) 6 Unit(s) SubCutaneous before dinner  enalapril 2.5 milliGRAM(s) Oral two times a day  metoprolol 75 milliGRAM(s) Oral every 12 hours  sodium chloride 0.9% lock flush 3 milliLiter(s) IV Push every 8 hours      Assessment: 67 year old man with uncontrolled DM and multivessel CAD now s/p CABG    #Multivessel CAD- s/p 2V CABG with LIMA to LAD and SVG to OM.  Tolerating ASA, beta-blocker, enalapril and high intensity statin. Ambulating.    #Cardiogenic shock- Now off milrinone and levophed. IABP removed .     #Respiratory failure- extubated and saturating well on NC.      #Acute on chronic systolic CHF- now off milrinone. Lasix 40mg IV BID. Low dose ACEi started.    #Uncontrolled DM II- basal/bolus insulin. Endo input appreciated    Rest of care as per CTU team.     Over 35 minutes spent on total encounter; more than 50% of the visit was spent counseling and/or coordinating care by the attending physician.      Brayan Devlin M.D.   Cardiovascular Disease  [phone]

## 2017-08-04 NOTE — PROGRESS NOTE ADULT - SUBJECTIVE AND OBJECTIVE BOX
Chief complaint  Patient is a 67y old  Male who presents with a chief complaint of I need heart surgery (28 Jul 2017 00:49)   Review of systems  Patient in bed, comfortable, no fever,  no hypoglycemia, feeling better, transferred to regular floor..    Labs and Fingersticks    CAPILLARY BLOOD GLUCOSE  232 (04 Aug 2017 11:31)  121 (04 Aug 2017 08:00)  116 (04 Aug 2017 06:00)  215 (03 Aug 2017 22:00)  116 (03 Aug 2017 17:35)  93 (03 Aug 2017 15:30)  84 (03 Aug 2017 15:00)  120 (03 Aug 2017 14:00)  136 (03 Aug 2017 13:00)  139 (03 Aug 2017 12:00)  136 (03 Aug 2017 11:00)  132 (03 Aug 2017 10:00)  121 (03 Aug 2017 09:00)  176 (03 Aug 2017 08:00)  142 (03 Aug 2017 07:00)  202 (03 Aug 2017 05:00)  172 (02 Aug 2017 22:00)  93 (02 Aug 2017 19:30)  93 (02 Aug 2017 19:00)  101 (02 Aug 2017 18:00)  104 (02 Aug 2017 17:00)  115 (02 Aug 2017 16:00)  121 (02 Aug 2017 15:00)  133 (02 Aug 2017 14:00)  136 (02 Aug 2017 13:00)  151 (02 Aug 2017 12:00)  152 (02 Aug 2017 11:00)  156 (02 Aug 2017 10:00)  160 (02 Aug 2017 09:00)  160 (02 Aug 2017 08:00)  116 (02 Aug 2017 07:00)  95 (02 Aug 2017 06:00)  95 (02 Aug 2017 05:30)  98 (02 Aug 2017 05:00)  101 (02 Aug 2017 04:00)  121 (02 Aug 2017 03:00)  130 (02 Aug 2017 02:00)  137 (02 Aug 2017 01:00)  145 (02 Aug 2017 00:00)  154 (01 Aug 2017 23:00)  169 (01 Aug 2017 22:00)  170 (01 Aug 2017 21:00)  133 (01 Aug 2017 20:00)  101 (01 Aug 2017 19:00)  92 (01 Aug 2017 18:30)  91 (01 Aug 2017 18:00)  108 (01 Aug 2017 17:00)  114 (01 Aug 2017 16:00)  143 (01 Aug 2017 15:00)  148 (01 Aug 2017 14:00)  177 (01 Aug 2017 12:45)    Anion Gap, Serum: 11 (08-04 @ 01:30)  Anion Gap, Serum: 13 (08-03 @ 04:44)  Anion Gap, Serum: 11 (08-02 @ 17:08)      Calcium, Total Serum: 8.4 (08-04 @ 01:30)  Calcium, Total Serum: 8.2 <L> (08-03 @ 04:44)  Calcium, Total Serum: 8.3 <L> (08-02 @ 17:08)  Albumin, Serum: 3.2 <L> (08-04 @ 01:30)  Albumin, Serum: 3.0 <L> (08-03 @ 04:44)  Albumin, Serum: 3.0 <L> (08-02 @ 17:08)    Alanine Aminotransferase (ALT/SGPT): 17 (08-04 @ 01:30)  Alanine Aminotransferase (ALT/SGPT): 16 (08-03 @ 04:44)  Alanine Aminotransferase (ALT/SGPT): 13 (08-02 @ 17:08)  Alkaline Phosphatase, Serum: 185 <H> (08-04 @ 01:30)  Alkaline Phosphatase, Serum: 200 <H> (08-03 @ 04:44)  Alkaline Phosphatase, Serum: 54 (08-02 @ 17:08)  Aspartate Aminotransferase (AST/SGOT): 28 (08-04 @ 01:30)  Aspartate Aminotransferase (AST/SGOT): 31 (08-03 @ 04:44)  Aspartate Aminotransferase (AST/SGOT): 25 (08-02 @ 17:08)        08-04    134<L>  |  98  |  24<H>  ----------------------------<  170<H>  4.2   |  25  |  1.04    Ca    8.4      04 Aug 2017 01:30  Phos  2.7     08-04    TPro  6.7  /  Alb  3.2<L>  /  TBili  0.9  /  DBili  x   /  AST  28  /  ALT  17  /  AlkPhos  185<H>  08-04                        12.1   11.4  )-----------( 235      ( 04 Aug 2017 01:30 )             36.5     Medications  MEDICATIONS  (STANDING):  sodium chloride 0.9%. 1000 milliLiter(s) (10 mL/Hr) IV Continuous <Continuous>  pantoprazole  Injectable 40 milliGRAM(s) IV Push daily  aspirin enteric coated 81 milliGRAM(s) Oral daily  enoxaparin Injectable 40 milliGRAM(s) SubCutaneous daily  atorvastatin 80 milliGRAM(s) Oral at bedtime  dextrose 5%. 1000 milliLiter(s) (50 mL/Hr) IV Continuous <Continuous>  dextrose 50% Injectable 12.5 Gram(s) IV Push once  dextrose 50% Injectable 25 Gram(s) IV Push once  dextrose 50% Injectable 25 Gram(s) IV Push once  insulin glargine Injectable (LANTUS) 24 Unit(s) SubCutaneous at bedtime  insulin lispro (HumaLOG) corrective regimen sliding scale   SubCutaneous three times a day before meals  insulin lispro (HumaLOG) corrective regimen sliding scale   SubCutaneous at bedtime  digoxin     Tablet 0.125 milliGRAM(s) Oral daily  furosemide   Injectable 40 milliGRAM(s) IV Push every 12 hours  potassium chloride    Tablet ER 20 milliEquivalent(s) Oral every 12 hours  insulin lispro Injectable (HumaLOG) 6 Unit(s) SubCutaneous before breakfast  insulin lispro Injectable (HumaLOG) 6 Unit(s) SubCutaneous before lunch  insulin lispro Injectable (HumaLOG) 6 Unit(s) SubCutaneous before dinner  enalapril 2.5 milliGRAM(s) Oral two times a day  metoprolol 75 milliGRAM(s) Oral every 12 hours  sodium chloride 0.9% lock flush 3 milliLiter(s) IV Push every 8 hours      Physical Exam  General: Patient comfortable in bed  Vital Signs Last 12 Hrs  T(F): 99.7 (08-04-17 @ 04:00), Max: 99.7 (08-04-17 @ 04:00)  HR: 81 (08-04-17 @ 10:00) (76 - 87)  BP: 112/62 (08-04-17 @ 10:00) (104/58 - 146/79)  BP(mean): 86 (08-04-17 @ 10:00) (84 - 106)  RR: 26 (08-04-17 @ 10:00) (21 - 28)  SpO2: 95% (08-04-17 @ 10:00) (94% - 97%)  Neck: No palpable thyroid nodules.  CVS: S1S2, No murmurs  Respiratory: No wheezing, no crepitations  GI: Abdomen soft, bowel sounds positive  Musculoskeletal: Positive edema lower extremities bilaterally  Skin: No skin rashes, no ecchimosis    Diagnostics    Thyroid Stimulating Hormone, Serum: AM Sched. Collection: 29-Jul-2017 06:00 (07-28 @ 11:45)  Free Thyroxine, Serum: AM Sched. Collection: 29-Jul-2017 06:00 (07-28 @ 11:45)

## 2017-08-04 NOTE — CONSULT NOTE ADULT - SUBJECTIVE AND OBJECTIVE BOX
Date of Admission:    Patient is a 67y old  Male who presents with a chief complaint of I need heart surgery (28 Jul 2017 00:49)      HISTORY OF PRESENT ILLNESS:     67M w/DM, HTN, CAD, Anemia, MI (?2007) and sCHF transferred from [facility] for CABG eval where he was admitted for CHF exacerbation course c/b VF arrest EP now consulted for ICD evaluation.       Allergies    No Known Allergies    Intolerances  	    MEDICATIONS:  aspirin enteric coated 81 milliGRAM(s) Oral daily  enoxaparin Injectable 40 milliGRAM(s) SubCutaneous daily  digoxin     Tablet 0.125 milliGRAM(s) Oral daily  furosemide   Injectable 40 milliGRAM(s) IV Push every 12 hours  enalapril 2.5 milliGRAM(s) Oral two times a day  metoprolol 75 milliGRAM(s) Oral every 12 hours      oxyCODONE    5 mG/acetaminophen 325 mG 1 Tablet(s) Oral every 4 hours PRN  oxyCODONE    5 mG/acetaminophen 325 mG 2 Tablet(s) Oral every 6 hours PRN    pantoprazole  Injectable 40 milliGRAM(s) IV Push daily    atorvastatin 80 milliGRAM(s) Oral at bedtime  dextrose Gel 1 Dose(s) Oral once PRN  dextrose 50% Injectable 12.5 Gram(s) IV Push once  dextrose 50% Injectable 25 Gram(s) IV Push once  dextrose 50% Injectable 25 Gram(s) IV Push once  glucagon  Injectable 1 milliGRAM(s) IntraMuscular once PRN  insulin glargine Injectable (LANTUS) 24 Unit(s) SubCutaneous at bedtime  insulin lispro (HumaLOG) corrective regimen sliding scale   SubCutaneous three times a day before meals  insulin lispro (HumaLOG) corrective regimen sliding scale   SubCutaneous at bedtime  insulin lispro Injectable (HumaLOG) 6 Unit(s) SubCutaneous before breakfast  insulin lispro Injectable (HumaLOG) 6 Unit(s) SubCutaneous before lunch  insulin lispro Injectable (HumaLOG) 6 Unit(s) SubCutaneous before dinner    sodium chloride 0.9%. 1000 milliLiter(s) IV Continuous <Continuous>  dextrose 5%. 1000 milliLiter(s) IV Continuous <Continuous>  potassium chloride    Tablet ER 20 milliEquivalent(s) Oral every 12 hours      PAST MEDICAL & SURGICAL HISTORY:  Anemia  DM (diabetes mellitus)  MI (myocardial infarction)  CAD (coronary artery disease)  CHF (congestive heart failure)  HLD (hyperlipidemia)  HTN (hypertension)  No significant past surgical history      FAMILY HISTORY:  No pertinent family history in first degree relatives      SOCIAL HISTORY:    [ ] Non-smoker;     REVIEW OF SYSTEMS:  See HPI. Otherwise, 10 point ROS done and otherwise negative.    PHYSICAL EXAM:  T(C): 37.6 (08-04-17 @ 04:00), Max: 37.7 (08-03-17 @ 20:00)  HR: 76 (08-04-17 @ 08:00) (76 - 88)  BP: 116/68 (08-04-17 @ 08:00) (110/76 - 157/85)  RR: 24 (08-04-17 @ 08:00) (21 - 28)  SpO2: 96% (08-04-17 @ 08:00) (94% - 97%)  Wt(kg): --  I&O's Summary    03 Aug 2017 07:01  -  04 Aug 2017 07:00  --------------------------------------------------------  IN: 965 mL / OUT: 3340 mL / NET: -2375 mL        Appearance: Normal	  HEENT:   Normal oral mucosa, PERRL, EOMI	  Lymphatic: No lymphadenopathy  Cardiovascular: Normal S1 S2, No JVD, No murmurs, No edema  Respiratory: Lungs clear to auscultation	  Psychiatry: A & O x 3, Mood & affect appropriate  Gastrointestinal:  Soft, Non-tender, + BS	  Skin: No rashes, No ecchymoses, No cyanosis	  Neurologic: Non-focal  Extremities: Normal range of motion, No clubbing, cyanosis or edema  Vascular: Peripheral pulses palpable 2+ bilaterally        LABS:	 	    CBC Full  -  ( 04 Aug 2017 01:30 )  WBC Count : 11.4 K/uL  Hemoglobin : 12.1 g/dL  Hematocrit : 36.5 %  Platelet Count - Automated : 235 K/uL  Mean Cell Volume : 90.3 fl  Mean Cell Hemoglobin : 29.9 pg  Mean Cell Hemoglobin Concentration : 33.1 gm/dL    08-04    134<L>  |  98  |  24<H>  ----------------------------<  170<H>  4.2   |  25  |  1.04  08-03    135  |  101  |  24<H>  ----------------------------<  207<H>  4.6   |  21<L>  |  1.26    Ca    8.4      04 Aug 2017 01:30  Ca    8.2<L>      03 Aug 2017 04:44  Phos  2.7     08-04  Phos  3.9     08-03    TPro  6.7  /  Alb  3.2<L>  /  TBili  0.9  /  DBili  x   /  AST  28  /  ALT  17  /  AlkPhos  185<H>  08-04  TPro  6.1  /  Alb  3.0<L>  /  TBili  1.0  /  DBili  x   /  AST  31  /  ALT  16  /  AlkPhos  200<H>  08-03        TELEMETRY: 	         EKG: 	    PREVIOUS DIAGNOSTIC TESTING:    [ ] Echocardiogram: < from: TTE with Doppler (w/Cont) (07.28.17 @ 14:08) >  Dimensions:    Normal Values:  LA:     4.3    2.0 - 4.0 cm  Ao:     3.5    2.0 - 3.8 cm  SEPTUM: 0.9    0.6 - 1.2 cm  PWT:    0.9    0.6 - 1.1 cm  LVIDd:  5.4    3.0 - 5.6 cm  LVIDs:  4.4    1.8 - 4.0 cm  Derived variables:  LVMI: 91 g/m2  RWT: 0.33  Fractional short: 19 %  EF (Visual Estimate): 20 %  Doppler Peak Velocity (m/sec): AoV=0.9    Conclusions:  1. Mildly dilated left atrium.  LA volume index = 35 cc/m2.  2. Severe segmental left ventricular systolic dysfunction.  Akinesis of the mid to apical anteroseptum, apex,  inferolateral wall.   Endocardial visualization enhanced  with intravenous injection of echo contrast (Definity).  Severe hypokinesis of the remaining segments.  3. Severe  diastolic dysfunction (Stage III).  4. Decreased right ventricular systolic function.  5. Right pleural effusion.  *** No previous Echo exam.    [ ]  Catheterization: 7/27 [facility] showing severe multi vessel CAD; pLAd 95% stenosis, mLAD 99% stenosis, OM1 99% stenosis, % stenosis. EF 35%  .  [ ] Stress Test: 7/24/17 which showed a severe fixed defect involving the mid to apical gayatri-septal wall and EF of 34%.  	  ASSESSMENT/PLAN: 	    67M w/DM, HTN, CAD, Anemia, MI (?2007) and sCHF transferred from [facility] for CABG eval where he was admitted for CHF exacerbation course c/b VF arrest EP now consulted for ICD evaluation. Date of Admission:    Patient is a 67y old  Male who presents with a chief complaint of I need heart surgery (28 Jul 2017 00:49)      HISTORY OF PRESENT ILLNESS:     67M w/DM, HTN, CAD, Anemia, MI (?2007) and sCHF transferred from [facility] for CABG eval where he was admitted for CHF exacerbation course c/b VF arrest now s/p 2 vCABG on 7/31 EP consulted for ICD evaluation.     Currently asymptomatic. No CP, palpitations, nausea/vomiting, SOB. He is ambulating.     Allergies    No Known Allergies    Intolerances  	    MEDICATIONS:  aspirin enteric coated 81 milliGRAM(s) Oral daily  enoxaparin Injectable 40 milliGRAM(s) SubCutaneous daily  digoxin     Tablet 0.125 milliGRAM(s) Oral daily  furosemide   Injectable 40 milliGRAM(s) IV Push every 12 hours  enalapril 2.5 milliGRAM(s) Oral two times a day  metoprolol 75 milliGRAM(s) Oral every 12 hours      oxyCODONE    5 mG/acetaminophen 325 mG 1 Tablet(s) Oral every 4 hours PRN  oxyCODONE    5 mG/acetaminophen 325 mG 2 Tablet(s) Oral every 6 hours PRN    pantoprazole  Injectable 40 milliGRAM(s) IV Push daily    atorvastatin 80 milliGRAM(s) Oral at bedtime  dextrose Gel 1 Dose(s) Oral once PRN  dextrose 50% Injectable 12.5 Gram(s) IV Push once  dextrose 50% Injectable 25 Gram(s) IV Push once  dextrose 50% Injectable 25 Gram(s) IV Push once  glucagon  Injectable 1 milliGRAM(s) IntraMuscular once PRN  insulin glargine Injectable (LANTUS) 24 Unit(s) SubCutaneous at bedtime  insulin lispro (HumaLOG) corrective regimen sliding scale   SubCutaneous three times a day before meals  insulin lispro (HumaLOG) corrective regimen sliding scale   SubCutaneous at bedtime  insulin lispro Injectable (HumaLOG) 6 Unit(s) SubCutaneous before breakfast  insulin lispro Injectable (HumaLOG) 6 Unit(s) SubCutaneous before lunch  insulin lispro Injectable (HumaLOG) 6 Unit(s) SubCutaneous before dinner    sodium chloride 0.9%. 1000 milliLiter(s) IV Continuous <Continuous>  dextrose 5%. 1000 milliLiter(s) IV Continuous <Continuous>  potassium chloride    Tablet ER 20 milliEquivalent(s) Oral every 12 hours      PAST MEDICAL & SURGICAL HISTORY:  Anemia  DM (diabetes mellitus)  MI (myocardial infarction)  CAD (coronary artery disease)  CHF (congestive heart failure)  HLD (hyperlipidemia)  HTN (hypertension)  No significant past surgical history      FAMILY HISTORY:  No pertinent family history in first degree relatives      SOCIAL HISTORY:    [ ] Non-smoker;     REVIEW OF SYSTEMS:  See HPI. Otherwise, 10 point ROS done and otherwise negative.    PHYSICAL EXAM:  T(C): 37.6 (08-04-17 @ 04:00), Max: 37.7 (08-03-17 @ 20:00)  HR: 76 (08-04-17 @ 08:00) (76 - 88)  BP: 116/68 (08-04-17 @ 08:00) (110/76 - 157/85)  RR: 24 (08-04-17 @ 08:00) (21 - 28)  SpO2: 96% (08-04-17 @ 08:00) (94% - 97%)  Wt(kg): --  I&O's Summary    03 Aug 2017 07:01  -  04 Aug 2017 07:00  --------------------------------------------------------  IN: 965 mL / OUT: 3340 mL / NET: -2375 mL        Appearance: Normal	  HEENT:   Normal oral mucosa, PERRL, EOMI	  Lymphatic: No lymphadenopathy  Cardiovascular: Normal S1 S2, No JVD, No murmurs, No edema  Respiratory: Lungs clear to auscultation	  Psychiatry: A & O x 3, Mood & affect appropriate  Gastrointestinal:  Soft, Non-tender, + BS	  Skin: No rashes, No ecchymoses, No cyanosis	  Neurologic: Non-focal  Extremities: Normal range of motion, No clubbing, cyanosis or edema  Vascular: Peripheral pulses palpable 2+ bilaterally        LABS:	 	    CBC Full  -  ( 04 Aug 2017 01:30 )  WBC Count : 11.4 K/uL  Hemoglobin : 12.1 g/dL  Hematocrit : 36.5 %  Platelet Count - Automated : 235 K/uL  Mean Cell Volume : 90.3 fl  Mean Cell Hemoglobin : 29.9 pg  Mean Cell Hemoglobin Concentration : 33.1 gm/dL    08-04    134<L>  |  98  |  24<H>  ----------------------------<  170<H>  4.2   |  25  |  1.04  08-03    135  |  101  |  24<H>  ----------------------------<  207<H>  4.6   |  21<L>  |  1.26    Ca    8.4      04 Aug 2017 01:30  Ca    8.2<L>      03 Aug 2017 04:44  Phos  2.7     08-04  Phos  3.9     08-03    TPro  6.7  /  Alb  3.2<L>  /  TBili  0.9  /  DBili  x   /  AST  28  /  ALT  17  /  AlkPhos  185<H>  08-04  TPro  6.1  /  Alb  3.0<L>  /  TBili  1.0  /  DBili  x   /  AST  31  /  ALT  16  /  AlkPhos  200<H>  08-03        TELEMETRY: 	         EKG: 	    PREVIOUS DIAGNOSTIC TESTING:    [ ] Echocardiogram: < from: TTE with Doppler (w/Cont) (07.28.17 @ 14:08) >  Dimensions:    Normal Values:  LA:     4.3    2.0 - 4.0 cm  Ao:     3.5    2.0 - 3.8 cm  SEPTUM: 0.9    0.6 - 1.2 cm  PWT:    0.9    0.6 - 1.1 cm  LVIDd:  5.4    3.0 - 5.6 cm  LVIDs:  4.4    1.8 - 4.0 cm  Derived variables:  LVMI: 91 g/m2  RWT: 0.33  Fractional short: 19 %  EF (Visual Estimate): 20 %  Doppler Peak Velocity (m/sec): AoV=0.9    Conclusions:  1. Mildly dilated left atrium.  LA volume index = 35 cc/m2.  2. Severe segmental left ventricular systolic dysfunction.  Akinesis of the mid to apical anteroseptum, apex,  inferolateral wall.   Endocardial visualization enhanced  with intravenous injection of echo contrast (Definity).  Severe hypokinesis of the remaining segments.  3. Severe  diastolic dysfunction (Stage III).  4. Decreased right ventricular systolic function.  5. Right pleural effusion.  *** No previous Echo exam.    [ ]  Catheterization: 7/27 [facility] showing severe multi vessel CAD; pLAd 95% stenosis, mLAD 99% stenosis, OM1 99% stenosis, % stenosis. EF 35%  .  [ ] Stress Test: 7/24/17 which showed a severe fixed defect involving the mid to apical gayatri-septal wall and EF of 34%.  	  ASSESSMENT/PLAN: 	    67M w/DM, HTN, CAD, Anemia, MI (?2007) and sCHF transferred from [facility] for CABG eval where he was admitted for CHF exacerbation course c/b VF arrest now s/p 2vCABG  EP now consulted for ICD evaluation.     #VF arrest with depressed EF 20%;; Patient meets ICD implantation criteria given sustained VF with depressed EF < 35%.   - Will d/w ICD with Dr. Wang and f/u with primary CTS team  - Optimal CHF meds and on dig 0.125mg qd and Metoprolol 75mg BID   - Will continue to follow Date of Admission:    Patient is a 67y old  Male who presents with a chief complaint of I need heart surgery (28 Jul 2017 00:49)      HISTORY OF PRESENT ILLNESS:     67M w/DM, HTN, CAD, Anemia, MI (?2007) and sCHF transferred from [facility] for CABG eval where he was admitted for CHF exacerbation course c/b VF arrest now s/p 2 vCABG on 7/31 EP consulted for ICD evaluation.     Currently asymptomatic. No CP, palpitations, nausea/vomiting, SOB. He is ambulating.     Allergies    No Known Allergies    Intolerances  	    MEDICATIONS:  aspirin enteric coated 81 milliGRAM(s) Oral daily  enoxaparin Injectable 40 milliGRAM(s) SubCutaneous daily  digoxin     Tablet 0.125 milliGRAM(s) Oral daily  furosemide   Injectable 40 milliGRAM(s) IV Push every 12 hours  enalapril 2.5 milliGRAM(s) Oral two times a day  metoprolol 75 milliGRAM(s) Oral every 12 hours      oxyCODONE    5 mG/acetaminophen 325 mG 1 Tablet(s) Oral every 4 hours PRN  oxyCODONE    5 mG/acetaminophen 325 mG 2 Tablet(s) Oral every 6 hours PRN    pantoprazole  Injectable 40 milliGRAM(s) IV Push daily    atorvastatin 80 milliGRAM(s) Oral at bedtime  dextrose Gel 1 Dose(s) Oral once PRN  dextrose 50% Injectable 12.5 Gram(s) IV Push once  dextrose 50% Injectable 25 Gram(s) IV Push once  dextrose 50% Injectable 25 Gram(s) IV Push once  glucagon  Injectable 1 milliGRAM(s) IntraMuscular once PRN  insulin glargine Injectable (LANTUS) 24 Unit(s) SubCutaneous at bedtime  insulin lispro (HumaLOG) corrective regimen sliding scale   SubCutaneous three times a day before meals  insulin lispro (HumaLOG) corrective regimen sliding scale   SubCutaneous at bedtime  insulin lispro Injectable (HumaLOG) 6 Unit(s) SubCutaneous before breakfast  insulin lispro Injectable (HumaLOG) 6 Unit(s) SubCutaneous before lunch  insulin lispro Injectable (HumaLOG) 6 Unit(s) SubCutaneous before dinner    sodium chloride 0.9%. 1000 milliLiter(s) IV Continuous <Continuous>  dextrose 5%. 1000 milliLiter(s) IV Continuous <Continuous>  potassium chloride    Tablet ER 20 milliEquivalent(s) Oral every 12 hours      PAST MEDICAL & SURGICAL HISTORY:  Anemia  DM (diabetes mellitus)  MI (myocardial infarction)  CAD (coronary artery disease)  CHF (congestive heart failure)  HLD (hyperlipidemia)  HTN (hypertension)  No significant past surgical history      FAMILY HISTORY:  No pertinent family history in first degree relatives      SOCIAL HISTORY:    [ ] Non-smoker;     REVIEW OF SYSTEMS:  See HPI. Otherwise, 10 point ROS done and otherwise negative.    PHYSICAL EXAM:  T(C): 37.6 (08-04-17 @ 04:00), Max: 37.7 (08-03-17 @ 20:00)  HR: 76 (08-04-17 @ 08:00) (76 - 88)  BP: 116/68 (08-04-17 @ 08:00) (110/76 - 157/85)  RR: 24 (08-04-17 @ 08:00) (21 - 28)  SpO2: 96% (08-04-17 @ 08:00) (94% - 97%)  Wt(kg): --  I&O's Summary    03 Aug 2017 07:01  -  04 Aug 2017 07:00  --------------------------------------------------------  IN: 965 mL / OUT: 3340 mL / NET: -2375 mL        Appearance: Well appearing sitting up in a chair at bedside, no distress  HEENT:  MMM OP clear	  Lymphatic: No lymphadenopathy  Cardiovascular: Normal S1 S2, No JVD, No murmurs, No edema  Respiratory: Trace crackles at bases	  Psychiatry: Alert, Mood & affect appropriate  Gastrointestinal:  Soft, Non-tender, + BS	  Skin: No rashes, No ecchymoses, No cyanosis	  Neurologic: Non-focal  Extremities: Normal range of motion, No clubbing, cyanosis or edema  Vascular: Peripheral pulses palpable 2+ bilaterally        LABS:	 	    CBC Full  -  ( 04 Aug 2017 01:30 )  WBC Count : 11.4 K/uL  Hemoglobin : 12.1 g/dL  Hematocrit : 36.5 %  Platelet Count - Automated : 235 K/uL  Mean Cell Volume : 90.3 fl  Mean Cell Hemoglobin : 29.9 pg  Mean Cell Hemoglobin Concentration : 33.1 gm/dL    08-04    134<L>  |  98  |  24<H>  ----------------------------<  170<H>  4.2   |  25  |  1.04  08-03    135  |  101  |  24<H>  ----------------------------<  207<H>  4.6   |  21<L>  |  1.26    Ca    8.4      04 Aug 2017 01:30  Ca    8.2<L>      03 Aug 2017 04:44  Phos  2.7     08-04  Phos  3.9     08-03    TPro  6.7  /  Alb  3.2<L>  /  TBili  0.9  /  DBili  x   /  AST  28  /  ALT  17  /  AlkPhos  185<H>  08-04  TPro  6.1  /  Alb  3.0<L>  /  TBili  1.0  /  DBili  x   /  AST  31  /  ALT  16  /  AlkPhos  200<H>  08-03      TELEMETRY: 	  Sinus 70-80s with PVCs        EKG: 	Sinus tach  with 1st degree AVB     PREVIOUS DIAGNOSTIC TESTING:    [ ] Echocardiogram: < from: TTE with Doppler (w/Cont) (07.28.17 @ 14:08) >  Dimensions:    Normal Values:  LA:     4.3    2.0 - 4.0 cm  Ao:     3.5    2.0 - 3.8 cm  SEPTUM: 0.9    0.6 - 1.2 cm  PWT:    0.9    0.6 - 1.1 cm  LVIDd:  5.4    3.0 - 5.6 cm  LVIDs:  4.4    1.8 - 4.0 cm  Derived variables:  LVMI: 91 g/m2  RWT: 0.33  Fractional short: 19 %  EF (Visual Estimate): 20 %  Doppler Peak Velocity (m/sec): AoV=0.9    Conclusions:  1. Mildly dilated left atrium.  LA volume index = 35 cc/m2.  2. Severe segmental left ventricular systolic dysfunction.  Akinesis of the mid to apical anteroseptum, apex,  inferolateral wall.   Endocardial visualization enhanced  with intravenous injection of echo contrast (Definity).  Severe hypokinesis of the remaining segments.  3. Severe  diastolic dysfunction (Stage III).  4. Decreased right ventricular systolic function.  5. Right pleural effusion.  *** No previous Echo exam.    [ ]  Catheterization: 7/27 [facility] showing severe multi vessel CAD; pLAd 95% stenosis, mLAD 99% stenosis, OM1 99% stenosis, % stenosis. EF 35%  .  [ ] Stress Test: 7/24/17 which showed a severe fixed defect involving the mid to apical gayatri-septal wall and EF of 34%.  	  ASSESSMENT/PLAN: 	    67M w/DM, HTN, CAD, Anemia, MI (?2007) and sCHF transferred from [facility] for CABG eval where he was admitted for CHF exacerbation course c/b VF arrest now s/p 2vCABG  EP now consulted for ICD evaluation.     #VF arrest with depressed EF 20%;; Patient meets ICD implantation criteria given sustained VF with depressed EF < 35%.   - Will d/w ICD with Dr. Wang and f/u with primary CTS team  - Optimal CHF meds and on dig 0.125mg qd and Metoprolol 75mg BID   - Will continue to follow Date of Admission:    Patient is a 67y old  Male who presents with a chief complaint of I need heart surgery (28 Jul 2017 00:49)      HISTORY OF PRESENT ILLNESS:     67M w/DM, HTN, CAD, Anemia, MI (?2007) and sCHF transferred from [facility] for CABG eval where he was admitted for CHF exacerbation course c/b VF arrest now s/p 2 vCABG on 7/31 EP consulted for ICD evaluation.     Currently asymptomatic. No CP, palpitations, nausea/vomiting, SOB. He is ambulating.     Allergies    No Known Allergies    Intolerances  	    MEDICATIONS:  aspirin enteric coated 81 milliGRAM(s) Oral daily  enoxaparin Injectable 40 milliGRAM(s) SubCutaneous daily  digoxin     Tablet 0.125 milliGRAM(s) Oral daily  furosemide   Injectable 40 milliGRAM(s) IV Push every 12 hours  enalapril 2.5 milliGRAM(s) Oral two times a day  metoprolol 75 milliGRAM(s) Oral every 12 hours      oxyCODONE    5 mG/acetaminophen 325 mG 1 Tablet(s) Oral every 4 hours PRN  oxyCODONE    5 mG/acetaminophen 325 mG 2 Tablet(s) Oral every 6 hours PRN    pantoprazole  Injectable 40 milliGRAM(s) IV Push daily    atorvastatin 80 milliGRAM(s) Oral at bedtime  dextrose Gel 1 Dose(s) Oral once PRN  dextrose 50% Injectable 12.5 Gram(s) IV Push once  dextrose 50% Injectable 25 Gram(s) IV Push once  dextrose 50% Injectable 25 Gram(s) IV Push once  glucagon  Injectable 1 milliGRAM(s) IntraMuscular once PRN  insulin glargine Injectable (LANTUS) 24 Unit(s) SubCutaneous at bedtime  insulin lispro (HumaLOG) corrective regimen sliding scale   SubCutaneous three times a day before meals  insulin lispro (HumaLOG) corrective regimen sliding scale   SubCutaneous at bedtime  insulin lispro Injectable (HumaLOG) 6 Unit(s) SubCutaneous before breakfast  insulin lispro Injectable (HumaLOG) 6 Unit(s) SubCutaneous before lunch  insulin lispro Injectable (HumaLOG) 6 Unit(s) SubCutaneous before dinner    sodium chloride 0.9%. 1000 milliLiter(s) IV Continuous <Continuous>  dextrose 5%. 1000 milliLiter(s) IV Continuous <Continuous>  potassium chloride    Tablet ER 20 milliEquivalent(s) Oral every 12 hours      PAST MEDICAL & SURGICAL HISTORY:  Anemia  DM (diabetes mellitus)  MI (myocardial infarction)  CAD (coronary artery disease)  CHF (congestive heart failure)  HLD (hyperlipidemia)  HTN (hypertension)  No significant past surgical history      FAMILY HISTORY:  No pertinent family history in first degree relatives      SOCIAL HISTORY:    [ ] Non-smoker;     REVIEW OF SYSTEMS:  See HPI. Otherwise, 10 point ROS done and otherwise negative.    PHYSICAL EXAM:  T(C): 37.6 (08-04-17 @ 04:00), Max: 37.7 (08-03-17 @ 20:00)  HR: 76 (08-04-17 @ 08:00) (76 - 88)  BP: 116/68 (08-04-17 @ 08:00) (110/76 - 157/85)  RR: 24 (08-04-17 @ 08:00) (21 - 28)  SpO2: 96% (08-04-17 @ 08:00) (94% - 97%)  Wt(kg): --  I&O's Summary    03 Aug 2017 07:01  -  04 Aug 2017 07:00  --------------------------------------------------------  IN: 965 mL / OUT: 3340 mL / NET: -2375 mL        Appearance: Well appearing sitting up in a chair at bedside, no distress  HEENT:  MMM OP clear	  Lymphatic: No lymphadenopathy  Cardiovascular: Normal S1 S2, No JVD, No murmurs, No edema  Respiratory: Trace crackles at bases	  Psychiatry: Alert, Mood & affect appropriate  Gastrointestinal:  Soft, Non-tender, + BS	  Skin: No rashes, No ecchymoses, No cyanosis	  Neurologic: Non-focal  Extremities: Normal range of motion, No clubbing, cyanosis or edema  Vascular: Peripheral pulses palpable 2+ bilaterally        LABS:	 	    CBC Full  -  ( 04 Aug 2017 01:30 )  WBC Count : 11.4 K/uL  Hemoglobin : 12.1 g/dL  Hematocrit : 36.5 %  Platelet Count - Automated : 235 K/uL  Mean Cell Volume : 90.3 fl  Mean Cell Hemoglobin : 29.9 pg  Mean Cell Hemoglobin Concentration : 33.1 gm/dL    08-04    134<L>  |  98  |  24<H>  ----------------------------<  170<H>  4.2   |  25  |  1.04  08-03    135  |  101  |  24<H>  ----------------------------<  207<H>  4.6   |  21<L>  |  1.26    Ca    8.4      04 Aug 2017 01:30  Ca    8.2<L>      03 Aug 2017 04:44  Phos  2.7     08-04  Phos  3.9     08-03    TPro  6.7  /  Alb  3.2<L>  /  TBili  0.9  /  DBili  x   /  AST  28  /  ALT  17  /  AlkPhos  185<H>  08-04  TPro  6.1  /  Alb  3.0<L>  /  TBili  1.0  /  DBili  x   /  AST  31  /  ALT  16  /  AlkPhos  200<H>  08-03      TELEMETRY: 	  Sinus 70-80s with PVCs        EKG: 	Sinus tach  with 1st degree AVB     PREVIOUS DIAGNOSTIC TESTING:    [ ] Echocardiogram: < from: TTE with Doppler (w/Cont) (07.28.17 @ 14:08) >  Dimensions:    Normal Values:  LA:     4.3    2.0 - 4.0 cm  Ao:     3.5    2.0 - 3.8 cm  SEPTUM: 0.9    0.6 - 1.2 cm  PWT:    0.9    0.6 - 1.1 cm  LVIDd:  5.4    3.0 - 5.6 cm  LVIDs:  4.4    1.8 - 4.0 cm  Derived variables:  LVMI: 91 g/m2  RWT: 0.33  Fractional short: 19 %  EF (Visual Estimate): 20 %  Doppler Peak Velocity (m/sec): AoV=0.9    Conclusions:  1. Mildly dilated left atrium.  LA volume index = 35 cc/m2.  2. Severe segmental left ventricular systolic dysfunction.  Akinesis of the mid to apical anteroseptum, apex,  inferolateral wall.   Endocardial visualization enhanced  with intravenous injection of echo contrast (Definity).  Severe hypokinesis of the remaining segments.  3. Severe  diastolic dysfunction (Stage III).  4. Decreased right ventricular systolic function.  5. Right pleural effusion.  *** No previous Echo exam.    [ ]  Catheterization: 7/27 [facility] showing severe multi vessel CAD; pLAd 95% stenosis, mLAD 99% stenosis, OM1 99% stenosis, % stenosis. EF 35%  .  [ ] Stress Test: 7/24/17 which showed a severe fixed defect involving the mid to apical gayatri-septal wall and EF of 34%.  	  ASSESSMENT/PLAN: 	    67M w/DM, HTN, CAD, Anemia, MI (?2007) and sCHF transferred from [facility] for CABG eval where he was admitted for CHF exacerbation course c/b VF arrest now s/p 2vCABG  EP now consulted for ICD evaluation.     #VF arrest with depressed EF 20%;; Patient meets ICD implantation criteria given sustained VF with depressed EF < 35%.   - Optimal CHF meds and on dig 0.125mg qd and Metoprolol 75mg BID   - Patient seen and evaluated with Dr. Wang, given that VF arrest occurred pre re-vascularization, will do an EP study for ICD evaluation

## 2017-08-05 DIAGNOSIS — I46.9 CARDIAC ARREST, CAUSE UNSPECIFIED: ICD-10-CM

## 2017-08-05 LAB
ANION GAP SERPL CALC-SCNC: 13 MMOL/L — SIGNIFICANT CHANGE UP (ref 5–17)
BUN SERPL-MCNC: 20 MG/DL — SIGNIFICANT CHANGE UP (ref 7–23)
CALCIUM SERPL-MCNC: 8.6 MG/DL — SIGNIFICANT CHANGE UP (ref 8.4–10.5)
CHLORIDE SERPL-SCNC: 99 MMOL/L — SIGNIFICANT CHANGE UP (ref 96–108)
CO2 SERPL-SCNC: 26 MMOL/L — SIGNIFICANT CHANGE UP (ref 22–31)
CREAT SERPL-MCNC: 0.95 MG/DL — SIGNIFICANT CHANGE UP (ref 0.5–1.3)
DIGOXIN SERPL-MCNC: 1.1 NG/ML — SIGNIFICANT CHANGE UP (ref 0.8–2)
GLUCOSE SERPL-MCNC: 75 MG/DL — SIGNIFICANT CHANGE UP (ref 70–99)
HCT VFR BLD CALC: 37.1 % — LOW (ref 39–50)
HGB BLD-MCNC: 12.4 G/DL — LOW (ref 13–17)
INR BLD: 1.13 RATIO — SIGNIFICANT CHANGE UP (ref 0.88–1.16)
MCHC RBC-ENTMCNC: 29.9 PG — SIGNIFICANT CHANGE UP (ref 27–34)
MCHC RBC-ENTMCNC: 33.4 GM/DL — SIGNIFICANT CHANGE UP (ref 32–36)
MCV RBC AUTO: 89.3 FL — SIGNIFICANT CHANGE UP (ref 80–100)
PLATELET # BLD AUTO: 258 K/UL — SIGNIFICANT CHANGE UP (ref 150–400)
POTASSIUM SERPL-MCNC: 4.1 MMOL/L — SIGNIFICANT CHANGE UP (ref 3.5–5.3)
POTASSIUM SERPL-SCNC: 4.1 MMOL/L — SIGNIFICANT CHANGE UP (ref 3.5–5.3)
PROTHROM AB SERPL-ACNC: 12.4 SEC — SIGNIFICANT CHANGE UP (ref 9.8–12.7)
RBC # BLD: 4.15 M/UL — LOW (ref 4.2–5.8)
RBC # FLD: 17.3 % — HIGH (ref 10.3–14.5)
SODIUM SERPL-SCNC: 138 MMOL/L — SIGNIFICANT CHANGE UP (ref 135–145)
WBC # BLD: 12 K/UL — HIGH (ref 3.8–10.5)
WBC # FLD AUTO: 12 K/UL — HIGH (ref 3.8–10.5)

## 2017-08-05 PROCEDURE — 93306 TTE W/DOPPLER COMPLETE: CPT | Mod: 26

## 2017-08-05 RX ORDER — INSULIN GLARGINE 100 [IU]/ML
15 INJECTION, SOLUTION SUBCUTANEOUS AT BEDTIME
Qty: 0 | Refills: 0 | Status: DISCONTINUED | OUTPATIENT
Start: 2017-08-05 | End: 2017-08-11

## 2017-08-05 RX ORDER — FUROSEMIDE 40 MG
40 TABLET ORAL DAILY
Qty: 0 | Refills: 0 | Status: DISCONTINUED | OUTPATIENT
Start: 2017-08-05 | End: 2017-08-11

## 2017-08-05 RX ORDER — SPIRONOLACTONE 25 MG/1
25 TABLET, FILM COATED ORAL
Qty: 0 | Refills: 0 | Status: DISCONTINUED | OUTPATIENT
Start: 2017-08-05 | End: 2017-08-11

## 2017-08-05 RX ORDER — INSULIN LISPRO 100/ML
6 VIAL (ML) SUBCUTANEOUS
Qty: 0 | Refills: 0 | Status: DISCONTINUED | OUTPATIENT
Start: 2017-08-05 | End: 2017-08-11

## 2017-08-05 RX ADMIN — Medication 2.5 MILLIGRAM(S): at 17:25

## 2017-08-05 RX ADMIN — Medication 6 UNIT(S): at 12:28

## 2017-08-05 RX ADMIN — SODIUM CHLORIDE 3 MILLILITER(S): 9 INJECTION INTRAMUSCULAR; INTRAVENOUS; SUBCUTANEOUS at 06:45

## 2017-08-05 RX ADMIN — Medication 75 MILLIGRAM(S): at 17:25

## 2017-08-05 RX ADMIN — Medication 6 UNIT(S): at 17:22

## 2017-08-05 RX ADMIN — SPIRONOLACTONE 25 MILLIGRAM(S): 25 TABLET, FILM COATED ORAL at 10:48

## 2017-08-05 RX ADMIN — PANTOPRAZOLE SODIUM 40 MILLIGRAM(S): 20 TABLET, DELAYED RELEASE ORAL at 10:51

## 2017-08-05 RX ADMIN — Medication 2.5 MILLIGRAM(S): at 06:45

## 2017-08-05 RX ADMIN — Medication 1: at 12:28

## 2017-08-05 RX ADMIN — Medication 0.12 MILLIGRAM(S): at 06:45

## 2017-08-05 RX ADMIN — SODIUM CHLORIDE 3 MILLILITER(S): 9 INJECTION INTRAMUSCULAR; INTRAVENOUS; SUBCUTANEOUS at 21:53

## 2017-08-05 RX ADMIN — OXYCODONE AND ACETAMINOPHEN 1 TABLET(S): 5; 325 TABLET ORAL at 12:34

## 2017-08-05 RX ADMIN — ENOXAPARIN SODIUM 40 MILLIGRAM(S): 100 INJECTION SUBCUTANEOUS at 10:50

## 2017-08-05 RX ADMIN — OXYCODONE AND ACETAMINOPHEN 1 TABLET(S): 5; 325 TABLET ORAL at 13:30

## 2017-08-05 RX ADMIN — ATORVASTATIN CALCIUM 80 MILLIGRAM(S): 80 TABLET, FILM COATED ORAL at 21:25

## 2017-08-05 RX ADMIN — Medication 40 MILLIGRAM(S): at 10:48

## 2017-08-05 RX ADMIN — Medication 81 MILLIGRAM(S): at 10:49

## 2017-08-05 RX ADMIN — Medication 75 MILLIGRAM(S): at 06:45

## 2017-08-05 RX ADMIN — SODIUM CHLORIDE 3 MILLILITER(S): 9 INJECTION INTRAMUSCULAR; INTRAVENOUS; SUBCUTANEOUS at 13:38

## 2017-08-05 RX ADMIN — INSULIN GLARGINE 15 UNIT(S): 100 INJECTION, SOLUTION SUBCUTANEOUS at 21:54

## 2017-08-05 RX ADMIN — SPIRONOLACTONE 25 MILLIGRAM(S): 25 TABLET, FILM COATED ORAL at 17:25

## 2017-08-05 NOTE — PROGRESS NOTE ADULT - PROBLEM SELECTOR PLAN 1
C/w ASA, beta blocker, statin. continue postop care  asa/ statin/ b-blockers/ ace for preop MI  pain management  antacid  pulm toilet  isolate pw  discharge planning- home next week after ep study

## 2017-08-05 NOTE — PROGRESS NOTE ADULT - PROBLEM SELECTOR PLAN 2
C/w insulin coverage as per endocrine. C/w insulin coverage as per endocrine.  diabetic diet  continue lantus/ humalog

## 2017-08-05 NOTE — PROGRESS NOTE ADULT - ASSESSMENT
Assessment  DMT2: 67y Male with DM T2 with hyperglycemia, on insulin had hypo event, eating meals, non compliant with low carb diet.  HTN: On meds, controlled  HLD:  on statin, tolerating.  CAD: On treatment, planing surgery.

## 2017-08-05 NOTE — PROGRESS NOTE ADULT - ASSESSMENT
66y/o male PMH DM2 (a1c 10), MI 2007, anemia, CHF, VT arrest preop now POD 5 s/p C2L with pre-op IABP with RTOR 8/1 for bleeding. 66 y/o M w/ PMH of DM, HTN, CAD, Anemia, MI (?2007) and CHF transferred from [facility] with triple vessel disease. Pt was admitted to [place] for an acute CHF exacerbation and was diuresed with IV Lasix BID. During hospitalization in [facility], he had a stress test on 7/24/17 which showed a severe fixed defect involving the mid to apical gayatri-septal wall and EF of 34%. His admission was complicated by Vfib cardiac arrest and ROSC was achieved after 12 minutes after 3 doses of epinephrine and defibrillation. Patient was then transferred to CCU at that time after ROSC and intubation. Pt was extubated in CCU and echo showed EF 35-40%, severe hypokinesis of anterior/septal/apical walls with severe diastolic dysfunction. Patient was transfused 1 unit PRBC for anemia and was started on iron supplementation. Pt transferred to [facility] cath lab for angiogram 7/27/17. Patient now transferred to [facility] CCU for close monitoring. s/p LHC showing severe multi vessel CAD; pLAd 95% stenosis, mLAD 99% stenosis, OM1 99% stenosis, % stenosis. EF 35%. Patient transferred to [facility] for CTS service for CABG evaluation 7/27 with Dr. Roa   s/p  7/31 C2L with pre-op IABP   endo consulted for uncontrolled dm- aic 10- pt on lantus and humalog as per endo   postop cardiogenic shock requiring inotropic and pressor support;  7/31 RTOR 8/1 for bleeding. +copious amounts of clot removed from pericardium  eps consulted for preop vfib arrest- eps study monday 8/7 with Dr. Roa   8/4 tx 2cohen  discharge planning- home pt next week

## 2017-08-05 NOTE — PROGRESS NOTE ADULT - PROBLEM SELECTOR PLAN 3
C/w beta blocker, ace inhibitor, digoxin, lasix. C/w beta blocker, ace inhibitor, digoxin,   diuresis

## 2017-08-05 NOTE — PROGRESS NOTE ADULT - SUBJECTIVE AND OBJECTIVE BOX
Chief complaint  Patient is a 67y old  Male who presents with a chief complaint of I need heart surgery (28 Jul 2017 00:49)   Review of systems  Patient in bed, comfortable, no fever, awake, had hypoglycemia.    Labs and Fingersticks    CAPILLARY BLOOD GLUCOSE  117 (05 Aug 2017 08:29)  69 (05 Aug 2017 08:00)  135 (04 Aug 2017 21:51)  125 (04 Aug 2017 16:14)  232 (04 Aug 2017 11:31)  121 (04 Aug 2017 08:00)  116 (04 Aug 2017 06:00)  215 (03 Aug 2017 22:00)  116 (03 Aug 2017 17:35)  93 (03 Aug 2017 15:30)  84 (03 Aug 2017 15:00)  120 (03 Aug 2017 14:00)  136 (03 Aug 2017 13:00)  139 (03 Aug 2017 12:00)  136 (03 Aug 2017 11:00)  132 (03 Aug 2017 10:00)  121 (03 Aug 2017 09:00)  176 (03 Aug 2017 08:00)  142 (03 Aug 2017 07:00)  202 (03 Aug 2017 05:00)  172 (02 Aug 2017 22:00)  93 (02 Aug 2017 19:30)  93 (02 Aug 2017 19:00)  101 (02 Aug 2017 18:00)  104 (02 Aug 2017 17:00)  115 (02 Aug 2017 16:00)  121 (02 Aug 2017 15:00)  133 (02 Aug 2017 14:00)  136 (02 Aug 2017 13:00)  151 (02 Aug 2017 12:00)  152 (02 Aug 2017 11:00)  156 (02 Aug 2017 10:00)    Anion Gap, Serum: 13 (08-05 @ 06:31)  Anion Gap, Serum: 11 (08-04 @ 01:30)      Calcium, Total Serum: 8.6 (08-05 @ 06:31)  Calcium, Total Serum: 8.4 (08-04 @ 01:30)  Albumin, Serum: 3.2 <L> (08-04 @ 01:30)    Alanine Aminotransferase (ALT/SGPT): 17 (08-04 @ 01:30)  Alkaline Phosphatase, Serum: 185 <H> (08-04 @ 01:30)  Aspartate Aminotransferase (AST/SGOT): 28 (08-04 @ 01:30)        08-05    138  |  99  |  20  ----------------------------<  75  4.1   |  26  |  0.95    Ca    8.6      05 Aug 2017 06:31  Phos  2.7     08-04    TPro  6.7  /  Alb  3.2<L>  /  TBili  0.9  /  DBili  x   /  AST  28  /  ALT  17  /  AlkPhos  185<H>  08-04                        12.4   12.0  )-----------( 258      ( 05 Aug 2017 06:31 )             37.1     Medications  MEDICATIONS  (STANDING):  sodium chloride 0.9%. 1000 milliLiter(s) (10 mL/Hr) IV Continuous <Continuous>  pantoprazole  Injectable 40 milliGRAM(s) IV Push daily  aspirin enteric coated 81 milliGRAM(s) Oral daily  enoxaparin Injectable 40 milliGRAM(s) SubCutaneous daily  atorvastatin 80 milliGRAM(s) Oral at bedtime  dextrose 5%. 1000 milliLiter(s) (50 mL/Hr) IV Continuous <Continuous>  dextrose 50% Injectable 12.5 Gram(s) IV Push once  dextrose 50% Injectable 25 Gram(s) IV Push once  dextrose 50% Injectable 25 Gram(s) IV Push once  insulin glargine Injectable (LANTUS) 24 Unit(s) SubCutaneous at bedtime  insulin lispro (HumaLOG) corrective regimen sliding scale   SubCutaneous three times a day before meals  insulin lispro (HumaLOG) corrective regimen sliding scale   SubCutaneous at bedtime  digoxin     Tablet 0.125 milliGRAM(s) Oral daily  insulin lispro Injectable (HumaLOG) 6 Unit(s) SubCutaneous before breakfast  insulin lispro Injectable (HumaLOG) 6 Unit(s) SubCutaneous before lunch  insulin lispro Injectable (HumaLOG) 6 Unit(s) SubCutaneous before dinner  enalapril 2.5 milliGRAM(s) Oral two times a day  metoprolol 75 milliGRAM(s) Oral every 12 hours  sodium chloride 0.9% lock flush 3 milliLiter(s) IV Push every 8 hours      Physical Exam  General: Patient comfortable in bed  Vital Signs Last 12 Hrs  T(F): 98.3 (08-05-17 @ 05:00), Max: 98.3 (08-05-17 @ 05:00)  HR: 92 (08-05-17 @ 06:42) (79 - 92)  BP: 103/61 (08-05-17 @ 06:42) (96/57 - 123/75)  BP(mean): --  RR: 18 (08-05-17 @ 06:42) (18 - 18)  SpO2: 96% (08-05-17 @ 06:42) (96% - 100%)  Neck: No palpable thyroid nodules.  CVS: S1S2, No murmurs  Respiratory: No wheezing, no crepitations  GI: Abdomen soft, bowel sounds positive  Musculoskeletal: Positive edema lower extremities bilaterally  Skin: No skin rashes, no ecchimosis    Diagnostics    Thyroid Stimulating Hormone, Serum: AM Sched. Collection: 29-Jul-2017 06:00 (07-28 @ 11:45)  Free Thyroxine, Serum: AM Sched. Collection: 29-Jul-2017 06:00 (07-28 @ 11:45)

## 2017-08-05 NOTE — PROGRESS NOTE ADULT - SUBJECTIVE AND OBJECTIVE BOX
Cardiovascular Disease Progress Note    Overnight events: No acute events overnight. Mr. Lundberg feels well. He denies chest pain or SOB.   Otherwise review of systems negative    Objective Findings:  T(C): 36.8 (08-05-17 @ 05:00), Max: 36.8 (08-04-17 @ 20:02)  HR: 92 (08-05-17 @ 06:42) (76 - 92)  BP: 103/61 (08-05-17 @ 06:42) (96/57 - 123/75)  RR: 18 (08-05-17 @ 06:42) (18 - 18)  SpO2: 96% (08-05-17 @ 06:42) (96% - 100%)  Wt(kg): --  Daily     Daily       Physical Exam:  Gen: NAD  HEENT: EOMI  CV: RRR, normal S1 + S2, no m/r/g  Lungs: CTAB  Abd: soft, non-tender  Ext: No edema    Telemetry: Sinus 80-90s; no ventricular ectopy noted.     Laboratory Data:                        12.4   12.0  )-----------( 258      ( 05 Aug 2017 06:31 )             37.1     08-05    138  |  99  |  20  ----------------------------<  75  4.1   |  26  |  0.95    Ca    8.6      05 Aug 2017 06:31  Phos  2.7     08-04    TPro  6.7  /  Alb  3.2<L>  /  TBili  0.9  /  DBili  x   /  AST  28  /  ALT  17  /  AlkPhos  185<H>  08-04    PT/INR - ( 05 Aug 2017 06:31 )   PT: 12.4 sec;   INR: 1.13 ratio         PTT - ( 04 Aug 2017 01:30 )  PTT:29.9 sec          Inpatient Medications:  MEDICATIONS  (STANDING):  sodium chloride 0.9%. 1000 milliLiter(s) (10 mL/Hr) IV Continuous <Continuous>  pantoprazole  Injectable 40 milliGRAM(s) IV Push daily  aspirin enteric coated 81 milliGRAM(s) Oral daily  enoxaparin Injectable 40 milliGRAM(s) SubCutaneous daily  atorvastatin 80 milliGRAM(s) Oral at bedtime  dextrose 5%. 1000 milliLiter(s) (50 mL/Hr) IV Continuous <Continuous>  dextrose 50% Injectable 12.5 Gram(s) IV Push once  dextrose 50% Injectable 25 Gram(s) IV Push once  dextrose 50% Injectable 25 Gram(s) IV Push once  insulin lispro (HumaLOG) corrective regimen sliding scale   SubCutaneous three times a day before meals  insulin lispro (HumaLOG) corrective regimen sliding scale   SubCutaneous at bedtime  digoxin     Tablet 0.125 milliGRAM(s) Oral daily  enalapril 2.5 milliGRAM(s) Oral two times a day  metoprolol 75 milliGRAM(s) Oral every 12 hours  sodium chloride 0.9% lock flush 3 milliLiter(s) IV Push every 8 hours  insulin glargine Injectable (LANTUS) 15 Unit(s) SubCutaneous at bedtime  insulin lispro Injectable (HumaLOG) 6 Unit(s) SubCutaneous three times a day before meals  furosemide    Tablet 40 milliGRAM(s) Oral daily  spironolactone 25 milliGRAM(s) Oral two times a day      Assessment: 67 year old man with uncontrolled DM and multivessel CAD now s/p CABG    #Multivessel CAD- s/p 2V CABG with LIMA to LAD and SVG to OM.  Tolerating ASA, beta-blocker, enalapril and high intensity statin. Ambulating.      #Acute on chronic systolic CHF- due to ischemic cardiomyopathy. Now off milrinone. Tolerating low dose enalapril, metoprolol, spironolactone, and furosemide. EP evaluation noted for possible ICD in the setting of severe LV dysfunction with h/o VF arrest. Plan for EP study for risk stratification.     #Uncontrolled DM II- basal/bolus insulin. Endo input appreciated    Over 35 minutes spent on total encounter; more than 50% of the visit was spent counseling and/or coordinating care by the attending physician.      Brayan Devlin M.D.   Cardiovascular Disease  [phone] Cardiovascular Disease Progress Note    Overnight events: No acute events overnight. Mr. Lundberg feels well. He denies chest pain or SOB.   Otherwise review of systems negative    Objective Findings:  T(C): 36.8 (08-05-17 @ 05:00), Max: 36.8 (08-04-17 @ 20:02)  HR: 92 (08-05-17 @ 06:42) (76 - 92)  BP: 103/61 (08-05-17 @ 06:42) (96/57 - 123/75)  RR: 18 (08-05-17 @ 06:42) (18 - 18)  SpO2: 96% (08-05-17 @ 06:42) (96% - 100%)  Wt(kg): --  Daily     Daily       Physical Exam:  Gen: NAD  HEENT: EOMI  CV: RRR, normal S1 + S2, no m/r/g  Lungs: CTAB  Abd: soft, non-tender  Ext: No edema    Telemetry: Sinus 80-90s; no ventricular ectopy noted.     Laboratory Data:                        12.4   12.0  )-----------( 258      ( 05 Aug 2017 06:31 )             37.1     08-05    138  |  99  |  20  ----------------------------<  75  4.1   |  26  |  0.95    Ca    8.6      05 Aug 2017 06:31  Phos  2.7     08-04    TPro  6.7  /  Alb  3.2<L>  /  TBili  0.9  /  DBili  x   /  AST  28  /  ALT  17  /  AlkPhos  185<H>  08-04    PT/INR - ( 05 Aug 2017 06:31 )   PT: 12.4 sec;   INR: 1.13 ratio         PTT - ( 04 Aug 2017 01:30 )  PTT:29.9 sec          Inpatient Medications:  MEDICATIONS  (STANDING):  sodium chloride 0.9%. 1000 milliLiter(s) (10 mL/Hr) IV Continuous <Continuous>  pantoprazole  Injectable 40 milliGRAM(s) IV Push daily  aspirin enteric coated 81 milliGRAM(s) Oral daily  enoxaparin Injectable 40 milliGRAM(s) SubCutaneous daily  atorvastatin 80 milliGRAM(s) Oral at bedtime  dextrose 5%. 1000 milliLiter(s) (50 mL/Hr) IV Continuous <Continuous>  dextrose 50% Injectable 12.5 Gram(s) IV Push once  dextrose 50% Injectable 25 Gram(s) IV Push once  dextrose 50% Injectable 25 Gram(s) IV Push once  insulin lispro (HumaLOG) corrective regimen sliding scale   SubCutaneous three times a day before meals  insulin lispro (HumaLOG) corrective regimen sliding scale   SubCutaneous at bedtime  digoxin     Tablet 0.125 milliGRAM(s) Oral daily  enalapril 2.5 milliGRAM(s) Oral two times a day  metoprolol 75 milliGRAM(s) Oral every 12 hours  sodium chloride 0.9% lock flush 3 milliLiter(s) IV Push every 8 hours  insulin glargine Injectable (LANTUS) 15 Unit(s) SubCutaneous at bedtime  insulin lispro Injectable (HumaLOG) 6 Unit(s) SubCutaneous three times a day before meals  furosemide    Tablet 40 milliGRAM(s) Oral daily  spironolactone 25 milliGRAM(s) Oral two times a day      Assessment: 67 year old man with uncontrolled DM and multivessel CAD now s/p CABG    #Multivessel CAD- s/p 2V CABG with LIMA to LAD and SVG to OM.  Tolerating ASA, beta-blocker, enalapril and high intensity statin. Ambulating.      #Acute on chronic systolic CHF- due to ischemic cardiomyopathy. Now off milrinone. Tolerating low dose enalapril, metoprolol, spironolactone, and furosemide. EP evaluation noted for possible ICD in the setting of severe LV dysfunction with h/o VF arrest. Plan for EP study for risk stratification.     #Uncontrolled DM II- basal/bolus insulin. Endo input appreciated    Care discussed with patient and daughter at bedside.     Over 35 minutes spent on total encounter; more than 50% of the visit was spent counseling and/or coordinating care by the attending physician.      Brayan Devlin M.D.   Cardiovascular Disease  [phone]

## 2017-08-05 NOTE — PROGRESS NOTE ADULT - SUBJECTIVE AND OBJECTIVE BOX
Subjective: "Hello", denies any complaints.  #[id number] utilized for interpretation.    VITAL SIGNS  Vital Signs Last 24 Hrs  T(C): 36.6 (08-04-17 @ 22:03), Max: 37.6 (08-04-17 @ 04:00)  T(F): 97.9 (08-04-17 @ 22:03), Max: 99.7 (08-04-17 @ 04:00)  HR: 79 (08-04-17 @ 22:03) (76 - 87)  BP: 123/75 (08-04-17 @ 22:03) (104/58 - 146/79)  RR: 18 (08-04-17 @ 22:03) (18 - 28)  SpO2: 100% (08-04-17 @ 22:03) (94% - 100%)  on (O2)              Telemetry/Alarms: SR 70s-80s with PVCs.   LVEF: 45%    MEDICATIONS  sodium chloride 0.9%. 1000 milliLiter(s) IV Continuous <Continuous>  oxyCODONE    5 mG/acetaminophen 325 mG 1 Tablet(s) Oral every 4 hours PRN  oxyCODONE    5 mG/acetaminophen 325 mG 2 Tablet(s) Oral every 6 hours PRN  pantoprazole  Injectable 40 milliGRAM(s) IV Push daily  aspirin enteric coated 81 milliGRAM(s) Oral daily  enoxaparin Injectable 40 milliGRAM(s) SubCutaneous daily  atorvastatin 80 milliGRAM(s) Oral at bedtime  dextrose 5%. 1000 milliLiter(s) IV Continuous <Continuous>  dextrose Gel 1 Dose(s) Oral once PRN  dextrose 50% Injectable 12.5 Gram(s) IV Push once  dextrose 50% Injectable 25 Gram(s) IV Push once  dextrose 50% Injectable 25 Gram(s) IV Push once  glucagon  Injectable 1 milliGRAM(s) IntraMuscular once PRN  insulin glargine Injectable (LANTUS) 24 Unit(s) SubCutaneous at bedtime  insulin lispro (HumaLOG) corrective regimen sliding scale   SubCutaneous three times a day before meals  insulin lispro (HumaLOG) corrective regimen sliding scale   SubCutaneous at bedtime  digoxin     Tablet 0.125 milliGRAM(s) Oral daily  insulin lispro Injectable (HumaLOG) 6 Unit(s) SubCutaneous before breakfast  insulin lispro Injectable (HumaLOG) 6 Unit(s) SubCutaneous before lunch  insulin lispro Injectable (HumaLOG) 6 Unit(s) SubCutaneous before dinner  enalapril 2.5 milliGRAM(s) Oral two times a day  metoprolol 75 milliGRAM(s) Oral every 12 hours  sodium chloride 0.9% lock flush 3 milliLiter(s) IV Push every 8 hours    PHYSICAL EXAM  General: well nourished, well developed, no acute distress  Neurology: alert and oriented x 3, nonfocal, no gross deficits  Respiratory: clear to auscultation bilaterally  CV: regular rate and rhythm, normal S1, S2  Abdomen: soft, nontender, nondistended, positive bowel sounds  Extremities: warm, well perfused. right leg with no edema. Left leg ace wrapped with trace edema. + DP pulses  Incisions: midline sternal incision, c/d/i. sternum stable.    08-03 @ 07:01  -  08-04 @ 07:00  --------------------------------------------------------  IN: 965 mL / OUT: 3340 mL / NET: -2375 mL    08-04 @ 07:01  -  08-05 @ 01:11  --------------------------------------------------------  IN: 600 mL / OUT: 650 mL / NET: -50 mL    Admit Wt: Drug Dosing Weight  Height (cm): 167.64 (31 Jul 2017 11:42)  Weight (kg): 90.1 (31 Jul 2017 11:42)  BMI (kg/m2): 32.1 (31 Jul 2017 11:42)  BSA (m2): 1.99 (31 Jul 2017 11:42)    LABS  08-04    134<L>  |  98  |  24<H>  ----------------------------<  170<H>  4.2   |  25  |  1.04    Ca    8.4      04 Aug 2017 01:30  Phos  2.7     08-04    TPro  6.7  /  Alb  3.2<L>  /  TBili  0.9  /  DBili  x   /  AST  28  /  ALT  17  /  AlkPhos  185<H>  08-04                      12.1   11.4  )-----------( 235      ( 04 Aug 2017 01:30 )             36.5          PT/INR - ( 04 Aug 2017 01:30 )   PT: 11.7 sec;   INR: 1.08 ratio      PTT - ( 04 Aug 2017 01:30 )  PTT:29.9 sec  Bilirubin Total, Serum: 0.9 mg/dL (08-04 @ 01:30)    CAPILLARY BLOOD GLUCOSE  135 (04 Aug 2017 21:51)  125 (04 Aug 2017 16:14)  232 (04 Aug 2017 11:31)  121 (04 Aug 2017 08:00)  116 (04 Aug 2017 06:00)         8/4 CXR: The heart is enlarged. Small right pleural effusion. The left chest tube was removed. No pneumothorax. Mediastinal tube was removed as well. A   central line is seen on the right and the  tip is  in the  superior vena cava. Status post sternotomy.    PAST MEDICAL & SURGICAL HISTORY:  Anemia  DM (diabetes mellitus)  MI (myocardial infarction)  CAD (coronary artery disease)  CHF (congestive heart failure)  HLD (hyperlipidemia)  HTN (hypertension)  No significant past surgical history

## 2017-08-05 NOTE — PROGRESS NOTE ADULT - ASSESSMENT
66y/o male PMH DM2 (a1c 10), MI 2007, anemia, CHF, VT arrest now POD 5 s/p C2L with pre-op IABP with RTOR 8/1 for bleeding.

## 2017-08-05 NOTE — PROGRESS NOTE ADULT - SUBJECTIVE AND OBJECTIVE BOX
VITAL SIGNS    Telemetry:  RSR 1ST 80-90  Vital Signs Last 24 Hrs  T(C): 37.2 (08-05-17 @ 14:00), Max: 37.2 (08-05-17 @ 14:00)  T(F): 98.9 (08-05-17 @ 14:00), Max: 98.9 (08-05-17 @ 14:00)  HR: 80 (08-05-17 @ 14:00) (76 - 92)  BP: 102/65 (08-05-17 @ 14:00) (96/57 - 123/75)  RR: 18 (08-05-17 @ 14:00) (18 - 18)  SpO2: 95% (08-05-17 @ 14:00) (95% - 100%)            08-04 @ 07:01  -  08-05 @ 07:00  --------------------------------------------------------  IN: 680 mL / OUT: 1400 mL / NET: -720 mL    08-05 @ 07:01  -  08-05 @ 15:53  --------------------------------------------------------  IN: 600 mL / OUT: 200 mL / NET: 400 mL       Daily     Daily   Admit Wt: Drug Dosing Weight  Height (cm): 167.64 (31 Jul 2017 11:42)  Weight (kg): 90.1 (31 Jul 2017 11:42)  BMI (kg/m2): 32.1 (31 Jul 2017 11:42)  BSA (m2): 1.99 (31 Jul 2017 11:42)      CAPILLARY BLOOD GLUCOSE  191 (05 Aug 2017 11:00)  117 (05 Aug 2017 08:29)  69 (05 Aug 2017 08:00)  135 (04 Aug 2017 21:51)  125 (04 Aug 2017 16:14)                PHYSICAL EXAM    Subjective: "Hi.   Neurology: alert and oriented x 3, nonfocal, no gross deficits  CV : tele:  RSR 1ST 80-90  Sternal Wound :  CDI with dressing , Stable; +PW   Lungs: clear diminished at the bases.   RR easy, unlabored   Abdomen: soft, nontender, nondistended, positive bowel sounds, + bowel movement   Neg N/V/D; obese abdomen   :  pt voiding without difficulty   Extremities:   RICHMOND; trace LE edema, neg calf tenderness.   PPP bilaterally ; left leg with AB     PW: +isolated   Chest tubes: none

## 2017-08-05 NOTE — PROGRESS NOTE ADULT - PROBLEM SELECTOR PLAN 6
EP following. EP study planned with possible plan for ICD placement 2/2 VT arrest. EP following. EP study planned with possible plan for EPS study possible AICD  monday 8/7   continue b-blockers and dig at this time   isolate pw

## 2017-08-06 LAB
ANION GAP SERPL CALC-SCNC: 12 MMOL/L — SIGNIFICANT CHANGE UP (ref 5–17)
BUN SERPL-MCNC: 19 MG/DL — SIGNIFICANT CHANGE UP (ref 7–23)
CALCIUM SERPL-MCNC: 8.3 MG/DL — LOW (ref 8.4–10.5)
CHLORIDE SERPL-SCNC: 99 MMOL/L — SIGNIFICANT CHANGE UP (ref 96–108)
CO2 SERPL-SCNC: 25 MMOL/L — SIGNIFICANT CHANGE UP (ref 22–31)
CREAT SERPL-MCNC: 0.94 MG/DL — SIGNIFICANT CHANGE UP (ref 0.5–1.3)
GLUCOSE SERPL-MCNC: 101 MG/DL — HIGH (ref 70–99)
HCT VFR BLD CALC: 33.1 % — LOW (ref 39–50)
HGB BLD-MCNC: 11.1 G/DL — LOW (ref 13–17)
MCHC RBC-ENTMCNC: 30 PG — SIGNIFICANT CHANGE UP (ref 27–34)
MCHC RBC-ENTMCNC: 33.5 GM/DL — SIGNIFICANT CHANGE UP (ref 32–36)
MCV RBC AUTO: 89.6 FL — SIGNIFICANT CHANGE UP (ref 80–100)
PLATELET # BLD AUTO: 245 K/UL — SIGNIFICANT CHANGE UP (ref 150–400)
POTASSIUM SERPL-MCNC: 3.9 MMOL/L — SIGNIFICANT CHANGE UP (ref 3.5–5.3)
POTASSIUM SERPL-SCNC: 3.9 MMOL/L — SIGNIFICANT CHANGE UP (ref 3.5–5.3)
RBC # BLD: 3.7 M/UL — LOW (ref 4.2–5.8)
RBC # FLD: 17.1 % — HIGH (ref 10.3–14.5)
SODIUM SERPL-SCNC: 136 MMOL/L — SIGNIFICANT CHANGE UP (ref 135–145)
WBC # BLD: 11.1 K/UL — HIGH (ref 3.8–10.5)
WBC # FLD AUTO: 11.1 K/UL — HIGH (ref 3.8–10.5)

## 2017-08-06 PROCEDURE — 71020: CPT | Mod: 26

## 2017-08-06 RX ORDER — POTASSIUM CHLORIDE 20 MEQ
20 PACKET (EA) ORAL ONCE
Qty: 0 | Refills: 0 | Status: COMPLETED | OUTPATIENT
Start: 2017-08-06 | End: 2017-08-06

## 2017-08-06 RX ADMIN — Medication 75 MILLIGRAM(S): at 06:15

## 2017-08-06 RX ADMIN — Medication 6 UNIT(S): at 12:07

## 2017-08-06 RX ADMIN — SPIRONOLACTONE 25 MILLIGRAM(S): 25 TABLET, FILM COATED ORAL at 06:19

## 2017-08-06 RX ADMIN — Medication 40 MILLIGRAM(S): at 06:15

## 2017-08-06 RX ADMIN — Medication 20 MILLIEQUIVALENT(S): at 17:31

## 2017-08-06 RX ADMIN — ATORVASTATIN CALCIUM 80 MILLIGRAM(S): 80 TABLET, FILM COATED ORAL at 21:49

## 2017-08-06 RX ADMIN — ENOXAPARIN SODIUM 40 MILLIGRAM(S): 100 INJECTION SUBCUTANEOUS at 12:06

## 2017-08-06 RX ADMIN — Medication 1: at 12:06

## 2017-08-06 RX ADMIN — SODIUM CHLORIDE 3 MILLILITER(S): 9 INJECTION INTRAMUSCULAR; INTRAVENOUS; SUBCUTANEOUS at 10:26

## 2017-08-06 RX ADMIN — SODIUM CHLORIDE 3 MILLILITER(S): 9 INJECTION INTRAMUSCULAR; INTRAVENOUS; SUBCUTANEOUS at 06:16

## 2017-08-06 RX ADMIN — Medication 0.12 MILLIGRAM(S): at 06:15

## 2017-08-06 RX ADMIN — PANTOPRAZOLE SODIUM 40 MILLIGRAM(S): 20 TABLET, DELAYED RELEASE ORAL at 12:08

## 2017-08-06 RX ADMIN — Medication 75 MILLIGRAM(S): at 17:31

## 2017-08-06 RX ADMIN — Medication 2.5 MILLIGRAM(S): at 17:31

## 2017-08-06 RX ADMIN — SODIUM CHLORIDE 3 MILLILITER(S): 9 INJECTION INTRAMUSCULAR; INTRAVENOUS; SUBCUTANEOUS at 21:44

## 2017-08-06 RX ADMIN — Medication 81 MILLIGRAM(S): at 12:06

## 2017-08-06 RX ADMIN — Medication 2.5 MILLIGRAM(S): at 06:15

## 2017-08-06 RX ADMIN — SPIRONOLACTONE 25 MILLIGRAM(S): 25 TABLET, FILM COATED ORAL at 17:31

## 2017-08-06 RX ADMIN — Medication 6 UNIT(S): at 09:53

## 2017-08-06 RX ADMIN — INSULIN GLARGINE 15 UNIT(S): 100 INJECTION, SOLUTION SUBCUTANEOUS at 21:49

## 2017-08-06 RX ADMIN — Medication 6 UNIT(S): at 17:31

## 2017-08-06 NOTE — PROGRESS NOTE ADULT - PROBLEM SELECTOR PLAN 2
On multiple medications, monitored and followed up by primary/cardiology team On multiple medications, monitored and followed up by primary/cardiology team  Multivessel CAD- s/p 2V CABG with LIMA to LAD and SVG to OM.

## 2017-08-06 NOTE — PROGRESS NOTE ADULT - ASSESSMENT
66 y/o M w/ PMH of DM, HTN, CAD, Anemia, MI (?2007) and CHF transferred from [facility] with triple vessel disease. Pt was admitted to [place] for an acute CHF exacerbation and was diuresed with IV Lasix BID. During hospitalization in [facility], he had a stress test on 7/24/17 which showed a severe fixed defect involving the mid to apical gayatri-septal wall and EF of 34%. His admission was complicated by Vfib cardiac arrest and ROSC was achieved after 12 minutes after 3 doses of epinephrine and defibrillation. Patient was then transferred to CCU at that time after ROSC and intubation. Pt was extubated in CCU and echo showed EF 35-40%, severe hypokinesis of anterior/septal/apical walls with severe diastolic dysfunction. Patient was transfused 1 unit PRBC for anemia and was started on iron supplementation. Pt transferred to [facility] cath lab for angiogram 7/27/17. Patient now transferred to [facility] CCU for close monitoring. s/p LHC showing severe multi vessel CAD; pLAd 95% stenosis, mLAD 99% stenosis, OM1 99% stenosis, % stenosis. EF 35%. Patient transferred to [facility] for CTS service for CABG evaluation 7/27 with Dr. Roa   s/p  7/31 C2L with pre-op IABP   endo consulted for uncontrolled dm- aic 10- pt on lantus and humalog as per endo   postop cardiogenic shock requiring inotropic and pressor support;  7/31 RTOR 8/1 for bleeding. +copious amounts of clot removed from pericardium  eps consulted for preop vfib arrest- eps study monday 8/7 with Dr. Roa   8/4 tx 2cohen  discharge planning- home pt next week

## 2017-08-06 NOTE — PROGRESS NOTE ADULT - PROBLEM SELECTOR PLAN 1
continue postop care  asa/ statin/ b-blockers/ ace for preop MI  pain management  antacid  pulm toilet  pw removed  discharge planning- home next week after ep study

## 2017-08-06 NOTE — PROGRESS NOTE ADULT - SUBJECTIVE AND OBJECTIVE BOX
Patient in bed, comfortable, no fever,  had no hypoglycemia since change in insulin 8/5/17    Labs and Fingersticks    CAPILLARY BLOOD GLUCOSE  103 (06 Aug 2017 07:40)  201 (05 Aug 2017 21:40)  141 (05 Aug 2017 16:42)  191 (05 Aug 2017 11:00)  117 (05 Aug 2017 08:29)  69 (05 Aug 2017 08:00)  135 (04 Aug 2017 21:51)  125 (04 Aug 2017 16:14)  232 (04 Aug 2017 11:31)  121 (04 Aug 2017 08:00)  116 (04 Aug 2017 06:00)  215 (03 Aug 2017 22:00)  116 (03 Aug 2017 17:35)  93 (03 Aug 2017 15:30)  84 (03 Aug 2017 15:00)  120 (03 Aug 2017 14:00)  136 (03 Aug 2017 13:00)  139 (03 Aug 2017 12:00)    Anion Gap, Serum: 12 (08-06 @ 05:24)  Anion Gap, Serum: 13 (08-05 @ 06:31)      Calcium, Total Serum: 8.3 <L> (08-06 @ 05:24)  Calcium, Total Serum: 8.6 (08-05 @ 06:31)      08-06    136  |  99  |  19  ----------------------------<  101<H>  3.9   |  25  |  0.94    Ca    8.3<L>      06 Aug 2017 05:24                          11.1   11.1  )-----------( 245      ( 06 Aug 2017 05:24 )             33.1     Medications  MEDICATIONS  (STANDING):  sodium chloride 0.9%. 1000 milliLiter(s) (10 mL/Hr) IV Continuous <Continuous>  pantoprazole  Injectable 40 milliGRAM(s) IV Push daily  aspirin enteric coated 81 milliGRAM(s) Oral daily  enoxaparin Injectable 40 milliGRAM(s) SubCutaneous daily  atorvastatin 80 milliGRAM(s) Oral at bedtime  dextrose 5%. 1000 milliLiter(s) (50 mL/Hr) IV Continuous <Continuous>  dextrose 50% Injectable 12.5 Gram(s) IV Push once  dextrose 50% Injectable 25 Gram(s) IV Push once  dextrose 50% Injectable 25 Gram(s) IV Push once  insulin lispro (HumaLOG) corrective regimen sliding scale   SubCutaneous three times a day before meals  insulin lispro (HumaLOG) corrective regimen sliding scale   SubCutaneous at bedtime  digoxin     Tablet 0.125 milliGRAM(s) Oral daily  enalapril 2.5 milliGRAM(s) Oral two times a day  metoprolol 75 milliGRAM(s) Oral every 12 hours  sodium chloride 0.9% lock flush 3 milliLiter(s) IV Push every 8 hours  insulin glargine Injectable (LANTUS) 15 Unit(s) SubCutaneous at bedtime  insulin lispro Injectable (HumaLOG) 6 Unit(s) SubCutaneous three times a day before meals  furosemide    Tablet 40 milliGRAM(s) Oral daily  spironolactone 25 milliGRAM(s) Oral two times a day      Physical Exam  General: Patient comfortable in bed  Vital Signs Last 12 Hrs  T(F): 99.3 (08-06-17 @ 05:04), Max: 99.3 (08-06-17 @ 05:04)  HR: 75 (08-06-17 @ 05:04) (75 - 75)  BP: 108/65 (08-06-17 @ 05:04) (108/65 - 108/65)  BP(mean): --  RR: 18 (08-06-17 @ 05:04) (18 - 18)  SpO2: 98% (08-06-17 @ 05:04) (98% - 98%)  Neck: No palpable thyroid nodules.  CVS: S1S2, No murmurs  Respiratory: No wheezing, no crepitations  GI: Abdomen soft, bowel sounds positive  Musculoskeletal: no lower extremities bilaterally  Skin: No skin rashes, no ecchymosis

## 2017-08-06 NOTE — PROGRESS NOTE ADULT - SUBJECTIVE AND OBJECTIVE BOX
Cardiovascular Disease Progress Note    Overnight events: No acute events overnight. Mr. Lundberg feels well. He denies chest pain or SOB.   Otherwise review of systems negative  Objective Findings:  T(C): 37.4 (17 @ 05:04), Max: 37.4 (17 @ 05:04)  HR: 75 (17 @ 05:04) (75 - 80)  BP: 108/65 (17 @ 05:04) (100/65 - 108/65)  RR: 18 (17 @ 05:04) (18 - 18)  SpO2: 98% (17 @ 05:04) (95% - 100%)  Wt(kg): --  Daily     Daily Weight in k.7 (06 Aug 2017 08:43)      Physical Exam:  Gen: NAD  HEENT: EOMI  CV: RRR, normal S1 + S2, no m/r/g  Lungs: CTAB  Abd: soft, non-tender  Ext: No edema  Skin: laceration at left chest by prior chest tube site.     Telemetry: Sinus 70-90s; No ectopy    Laboratory Data:                        11.1   11.1  )-----------( 245      ( 06 Aug 2017 05:24 )             33.1     08-    136  |  99  |  19  ----------------------------<  101<H>  3.9   |  25  |  0.94    Ca    8.3<L>      06 Aug 2017 05:24      PT/INR - ( 05 Aug 2017 06:31 )   PT: 12.4 sec;   INR: 1.13 ratio                   Inpatient Medications:  MEDICATIONS  (STANDING):  sodium chloride 0.9%. 1000 milliLiter(s) (10 mL/Hr) IV Continuous <Continuous>  pantoprazole  Injectable 40 milliGRAM(s) IV Push daily  aspirin enteric coated 81 milliGRAM(s) Oral daily  enoxaparin Injectable 40 milliGRAM(s) SubCutaneous daily  atorvastatin 80 milliGRAM(s) Oral at bedtime  dextrose 5%. 1000 milliLiter(s) (50 mL/Hr) IV Continuous <Continuous>  dextrose 50% Injectable 12.5 Gram(s) IV Push once  dextrose 50% Injectable 25 Gram(s) IV Push once  dextrose 50% Injectable 25 Gram(s) IV Push once  insulin lispro (HumaLOG) corrective regimen sliding scale   SubCutaneous three times a day before meals  insulin lispro (HumaLOG) corrective regimen sliding scale   SubCutaneous at bedtime  digoxin     Tablet 0.125 milliGRAM(s) Oral daily  enalapril 2.5 milliGRAM(s) Oral two times a day  metoprolol 75 milliGRAM(s) Oral every 12 hours  sodium chloride 0.9% lock flush 3 milliLiter(s) IV Push every 8 hours  insulin glargine Injectable (LANTUS) 15 Unit(s) SubCutaneous at bedtime  insulin lispro Injectable (HumaLOG) 6 Unit(s) SubCutaneous three times a day before meals  furosemide    Tablet 40 milliGRAM(s) Oral daily  spironolactone 25 milliGRAM(s) Oral two times a day      Assessment: 67 year old man with uncontrolled DM and multivessel CAD now s/p CABG    #Multivessel CAD- s/p 2V CABG with LIMA to LAD and SVG to OM.  Tolerating ASA, beta-blocker, enalapril and high intensity statin. Ambulating.      #Acute on chronic systolic CHF- due to ischemic cardiomyopathy. Now off milrinone. Tolerating low dose enalapril, metoprolol, spironolactone, and furosemide. EP evaluation noted for possible ICD in the setting of severe LV dysfunction with h/o VF arrest. Plan for EP study for risk stratification.     #Uncontrolled DM II- basal/bolus insulin. Endo input appreciated    #L chest laceration- at prior chest tube site. Plan to staple it today after bath. Discussed with CTU NP.     Care discussed with patient and son bedside.     Over 35 minutes spent on total encounter; more than 50% of the visit was spent counseling and/or coordinating care by the attending physician.      Brayan Devlin M.D.   Cardiovascular Disease  [phone]

## 2017-08-06 NOTE — PROGRESS NOTE ADULT - PROBLEM SELECTOR PLAN 6
EP following. EP study planned with possible plan for EPS study possible AICD  monday 8/7   continue b-blockers and dig at this time   isolate pw

## 2017-08-06 NOTE — PROGRESS NOTE ADULT - ASSESSMENT
Assessment  DMT2: 67y Male with DM T2 with hyperglycemia, on basal/bolus insulin no further hypoglycemic events since lowered dose of insulin, eating meals  HTN: On meds, controlled  HLD:  on statin, tolerating.  CAD: On treatment, planing surgery. Assessment  DMT2: 67y Male with DM T2 with hyperglycemia, on basal/bolus insulin no further hypoglycemic events since lowered dose of insulin, eating meals

## 2017-08-06 NOTE — PROGRESS NOTE ADULT - PROBLEM SELECTOR PLAN 1
Will continue Lantus to 15u qhs, and Humalog 6 units before meals  -Patient counseled for compliance with consistent low carb diet.

## 2017-08-06 NOTE — PROGRESS NOTE ADULT - SUBJECTIVE AND OBJECTIVE BOX
VITAL SIGNS    Telemetry:  SR   Vital Signs Last 24 Hrs  T(C): 36.8 (17 @ 13:24), Max: 37.4 (17 @ 05:04)  T(F): 98.3 (17 @ 13:24), Max: 99.3 (17 @ 05:04)  HR: 78 (17 @ 13:24) (75 - 78)  BP: 110/67 (17 @ 13:24) (100/65 - 110/67)  RR: 17 (17 @ 13:24) (17 - 18)  SpO2: 94% (17 @ 13:24) (94% - 100%)             @ 07:01  -   @ 07:00  --------------------------------------------------------  IN: 960 mL / OUT: 650 mL / NET: 310 mL     @ 07:01  -   @ 15:54  --------------------------------------------------------  IN: 720 mL / OUT: 200 mL / NET: 520 mL         Daily Weight in k.7 (06 Aug 2017 08:43)  Admit Wt: Drug Dosing Weight  Height (cm): 167.64 (2017 11:42)  Weight (kg): 90.1 (2017 11:42)  BMI (kg/m2): 32.1 (2017 11:42)  BSA (m2): 1.99 (2017 11:42)      CAPILLARY BLOOD GLUCOSE  173 (06 Aug 2017 11:00)  103 (06 Aug 2017 07:40)  201 (05 Aug 2017 21:40)  141 (05 Aug 2017 16:42)         MEDICATIONS  sodium chloride 0.9%. 1000 milliLiter(s) IV Continuous <Continuous>  oxyCODONE    5 mG/acetaminophen 325 mG 1 Tablet(s) Oral every 4 hours PRN  oxyCODONE    5 mG/acetaminophen 325 mG 2 Tablet(s) Oral every 6 hours PRN  pantoprazole  Injectable 40 milliGRAM(s) IV Push daily  aspirin enteric coated 81 milliGRAM(s) Oral daily  enoxaparin Injectable 40 milliGRAM(s) SubCutaneous daily  atorvastatin 80 milliGRAM(s) Oral at bedtime    glucagon  Injectable 1 milliGRAM(s) IntraMuscular once PRN  insulin lispro (HumaLOG) corrective regimen sliding scale   SubCutaneous three times a day before meals  insulin lispro (HumaLOG) corrective regimen sliding scale   SubCutaneous at bedtime  digoxin     Tablet 0.125 milliGRAM(s) Oral daily  enalapril 2.5 milliGRAM(s) Oral two times a day  metoprolol 75 milliGRAM(s) Oral every 12 hours  sodium chloride 0.9% lock flush 3 milliLiter(s) IV Push every 8 hours  insulin glargine Injectable (LANTUS) 15 Unit(s) SubCutaneous at bedtime  insulin lispro Injectable (HumaLOG) 6 Unit(s) SubCutaneous three times a day before meals  furosemide    Tablet 40 milliGRAM(s) Oral daily  spironolactone 25 milliGRAM(s) Oral two times a day      PHYSICAL EXAM    Subjective:"HELLO"  Neurology: alert and oriented x 3, nonfocal, no gross deficits  CV : s1s1 reg no MRGS  Sternal Wound :  CDI , Stable  Lungs: CTA, equal chest expansion  Abdomen: soft, nontender, nondistended, positive bowel sounds, last bowel movement 17  :    voids  Extremities:+2 BLLE   edema,  left EVG CDI +dp b/l , no calt tenderness b/l , warm and perfused b/l    LABS      136  |  99  |  19  ----------------------------<  101<H>  3.9   |  25  |  0.94    Ca    8.3<L>      06 Aug 2017 05:24                                   11.1   11.1  )-----------( 245      ( 06 Aug 2017 05:24 )             33.1          PT/INR - ( 05 Aug 2017 06:31 )   PT: 12.4 sec;   INR: 1.13 ratio              PAST MEDICAL & SURGICAL HISTORY:  Anemia  DM (diabetes mellitus)  MI (myocardial infarction)  CAD (coronary artery disease)  CHF (congestive heart failure)  HLD (hyperlipidemia)  HTN (hypertension)  No significant past surgical history       Physical Therapy Rec:   Home  [  ]   Home w/ PT  [  ]  Rehab  [  ]

## 2017-08-07 DIAGNOSIS — Z95.1 PRESENCE OF AORTOCORONARY BYPASS GRAFT: ICD-10-CM

## 2017-08-07 LAB
ANION GAP SERPL CALC-SCNC: 10 MMOL/L — SIGNIFICANT CHANGE UP (ref 5–17)
BUN SERPL-MCNC: 20 MG/DL — SIGNIFICANT CHANGE UP (ref 7–23)
CALCIUM SERPL-MCNC: 8.2 MG/DL — LOW (ref 8.4–10.5)
CHLORIDE SERPL-SCNC: 100 MMOL/L — SIGNIFICANT CHANGE UP (ref 96–108)
CO2 SERPL-SCNC: 27 MMOL/L — SIGNIFICANT CHANGE UP (ref 22–31)
CREAT SERPL-MCNC: 0.94 MG/DL — SIGNIFICANT CHANGE UP (ref 0.5–1.3)
GLUCOSE SERPL-MCNC: 88 MG/DL — SIGNIFICANT CHANGE UP (ref 70–99)
HCT VFR BLD CALC: 32.6 % — LOW (ref 39–50)
HGB BLD-MCNC: 10.6 G/DL — LOW (ref 13–17)
MCHC RBC-ENTMCNC: 29.4 PG — SIGNIFICANT CHANGE UP (ref 27–34)
MCHC RBC-ENTMCNC: 32.6 GM/DL — SIGNIFICANT CHANGE UP (ref 32–36)
MCV RBC AUTO: 90.2 FL — SIGNIFICANT CHANGE UP (ref 80–100)
PLATELET # BLD AUTO: 249 K/UL — SIGNIFICANT CHANGE UP (ref 150–400)
POTASSIUM SERPL-MCNC: 3.9 MMOL/L — SIGNIFICANT CHANGE UP (ref 3.5–5.3)
POTASSIUM SERPL-SCNC: 3.9 MMOL/L — SIGNIFICANT CHANGE UP (ref 3.5–5.3)
RBC # BLD: 3.62 M/UL — LOW (ref 4.2–5.8)
RBC # FLD: 16.9 % — HIGH (ref 10.3–14.5)
SODIUM SERPL-SCNC: 137 MMOL/L — SIGNIFICANT CHANGE UP (ref 135–145)
WBC # BLD: 10.9 K/UL — HIGH (ref 3.8–10.5)
WBC # FLD AUTO: 10.9 K/UL — HIGH (ref 3.8–10.5)

## 2017-08-07 PROCEDURE — 99233 SBSQ HOSP IP/OBS HIGH 50: CPT | Mod: GC

## 2017-08-07 RX ADMIN — Medication 75 MILLIGRAM(S): at 17:41

## 2017-08-07 RX ADMIN — Medication 2.5 MILLIGRAM(S): at 17:41

## 2017-08-07 RX ADMIN — Medication 6 UNIT(S): at 12:00

## 2017-08-07 RX ADMIN — Medication 40 MILLIGRAM(S): at 05:45

## 2017-08-07 RX ADMIN — Medication 2.5 MILLIGRAM(S): at 05:45

## 2017-08-07 RX ADMIN — ENOXAPARIN SODIUM 40 MILLIGRAM(S): 100 INJECTION SUBCUTANEOUS at 12:00

## 2017-08-07 RX ADMIN — Medication 6 UNIT(S): at 17:40

## 2017-08-07 RX ADMIN — Medication 75 MILLIGRAM(S): at 05:45

## 2017-08-07 RX ADMIN — SPIRONOLACTONE 25 MILLIGRAM(S): 25 TABLET, FILM COATED ORAL at 05:45

## 2017-08-07 RX ADMIN — SODIUM CHLORIDE 3 MILLILITER(S): 9 INJECTION INTRAMUSCULAR; INTRAVENOUS; SUBCUTANEOUS at 11:33

## 2017-08-07 RX ADMIN — Medication 0.12 MILLIGRAM(S): at 05:45

## 2017-08-07 RX ADMIN — SODIUM CHLORIDE 3 MILLILITER(S): 9 INJECTION INTRAMUSCULAR; INTRAVENOUS; SUBCUTANEOUS at 21:49

## 2017-08-07 RX ADMIN — Medication 81 MILLIGRAM(S): at 12:00

## 2017-08-07 RX ADMIN — Medication 2: at 17:40

## 2017-08-07 RX ADMIN — SODIUM CHLORIDE 3 MILLILITER(S): 9 INJECTION INTRAMUSCULAR; INTRAVENOUS; SUBCUTANEOUS at 06:32

## 2017-08-07 RX ADMIN — SPIRONOLACTONE 25 MILLIGRAM(S): 25 TABLET, FILM COATED ORAL at 17:41

## 2017-08-07 RX ADMIN — INSULIN GLARGINE 15 UNIT(S): 100 INJECTION, SOLUTION SUBCUTANEOUS at 22:46

## 2017-08-07 RX ADMIN — ATORVASTATIN CALCIUM 80 MILLIGRAM(S): 80 TABLET, FILM COATED ORAL at 22:46

## 2017-08-07 NOTE — PROGRESS NOTE ADULT - PROBLEM SELECTOR PLAN 6
continue postop care  asa/ statin/ b-blockers/ ace for preop MI  pain management  antacid  pulm toilet  isolate pw  await EP study with Dr. Wang  discharge planning- home

## 2017-08-07 NOTE — PROGRESS NOTE ADULT - ASSESSMENT
66 y/o M w/ PMH of DM, HTN, CAD, Anemia, MI (?2007) and CHF transferred from [facility] with triple vessel disease. Pt was admitted to [facility] for an acute CHF exacerbation and was diuresed with IV Lasix BID. During hospitalization in [facility], he had a stress test on 7/24/17 which showed a severe fixed defect involving the mid to apical gayatri-septal wall and EF of 34%. His admission was complicated by Vfib cardiac arrest and ROSC was achieved after 12 minutes after 3 doses of epinephrine and defibrillation. Patient was then transferred to CCU at that time after ROSC and intubation. Pt was extubated in CCU and echo showed EF 35-40%, severe hypokinesis of anterior/septal/apical walls with severe diastolic dysfunction. Patient was transfused 1 unit PRBC for anemia and was started on iron supplementation. Pt transferred to [facility] cath lab for angiogram 7/27/17. Patient now transferred to [facility] CCU for close monitoring. s/p LHC showing severe multi vessel CAD; pLAd 95% stenosis, mLAD 99% stenosis, OM1 99% stenosis, % stenosis. EF 35%. Patient transferred to [facility] for CTS service for CABG evaluation 7/27 with Dr. Roa   s/p  7/31 C2L with pre-op IABP   endo consulted for uncontrolled dm- aic 10- pt on lantus and humalog as per endo   postop cardiogenic shock requiring inotropic and pressor support;  7/31 RTOR 8/1 for bleeding. +copious amounts of clot removed from pericardium  eps consulted for preop vfib arrest- eps study in am 8/8 with Dr. Wang   8/4 tx 2co[facility]  discharge planning- home pt

## 2017-08-07 NOTE — PROGRESS NOTE ADULT - PROBLEM SELECTOR PLAN 2
On multiple medications, monitored and followed up by primary/cardiology team  Multivessel CAD- s/p 2V CABG with LIMA to LAD and SVG to OM.

## 2017-08-07 NOTE — PROGRESS NOTE ADULT - SUBJECTIVE AND OBJECTIVE BOX
Cardiovascular Disease Progress Note    Overnight events: No acute events overnight. Mr. Lundberg feels well. He denies chest pain or SOB.   Otherwise review of systems negative    Objective Findings:  T(C): 36.9 (17 @ 04:26), Max: 36.9 (17 @ 04:26)  HR: 87 (17 @ 04:26) (76 - 87)  BP: 109/63 (17 @ 04:26) (109/63 - 114/69)  RR: 19 (17 @ 04:26) (17 - 19)  SpO2: 99% (17 @ 04:26) (94% - 100%)  Wt(kg): --  Daily     Daily Weight in k.7 (06 Aug 2017 08:43)      Physical Exam:  Gen: NAD  HEENT: EOMI  CV: RRR, normal S1 + S2, no m/r/g  Lungs: CTAB  Abd: soft, non-tender  Ext: No edema    Telemetry: Sinus 60-80; no ectopy    Laboratory Data:                        10.6   10.9  )-----------( 249      ( 07 Aug 2017 05:07 )             32.6     08-07    137  |  100  |  20  ----------------------------<  88  3.9   |  27  |  0.94    Ca    8.2<L>      07 Aug 2017 05:07                Inpatient Medications:  MEDICATIONS  (STANDING):  sodium chloride 0.9%. 1000 milliLiter(s) (10 mL/Hr) IV Continuous <Continuous>  pantoprazole  Injectable 40 milliGRAM(s) IV Push daily  aspirin enteric coated 81 milliGRAM(s) Oral daily  enoxaparin Injectable 40 milliGRAM(s) SubCutaneous daily  atorvastatin 80 milliGRAM(s) Oral at bedtime  dextrose 5%. 1000 milliLiter(s) (50 mL/Hr) IV Continuous <Continuous>  dextrose 50% Injectable 12.5 Gram(s) IV Push once  dextrose 50% Injectable 25 Gram(s) IV Push once  dextrose 50% Injectable 25 Gram(s) IV Push once  insulin lispro (HumaLOG) corrective regimen sliding scale   SubCutaneous three times a day before meals  insulin lispro (HumaLOG) corrective regimen sliding scale   SubCutaneous at bedtime  digoxin     Tablet 0.125 milliGRAM(s) Oral daily  enalapril 2.5 milliGRAM(s) Oral two times a day  metoprolol 75 milliGRAM(s) Oral every 12 hours  sodium chloride 0.9% lock flush 3 milliLiter(s) IV Push every 8 hours  insulin glargine Injectable (LANTUS) 15 Unit(s) SubCutaneous at bedtime  insulin lispro Injectable (HumaLOG) 6 Unit(s) SubCutaneous three times a day before meals  furosemide    Tablet 40 milliGRAM(s) Oral daily  spironolactone 25 milliGRAM(s) Oral two times a day      Assessment: 67 year old man with uncontrolled DM and multivessel CAD now s/p CABG    #Multivessel CAD- s/p 2V CABG with LIMA to LAD and SVG to OM.  Tolerating ASA, beta-blocker, enalapril and high intensity statin. Ambulating.      #Acute on chronic systolic CHF- due to ischemic cardiomyopathy. Now off milrinone. Tolerating low dose enalapril, metoprolol, spironolactone, and furosemide. EP study today for possible ICD in the setting of severe LV dysfunction with h/o VF arrest.      #Uncontrolled DM II- basal/bolus insulin. Endo input appreciated    Over 35 minutes spent on total encounter; more than 50% of the visit was spent counseling and/or coordinating care by the attending physician.      Brayan Devlin M.D.   Cardiovascular Disease  [phone]

## 2017-08-07 NOTE — PROGRESS NOTE ADULT - SUBJECTIVE AND OBJECTIVE BOX
Date of Admission: 7/28/17     24H hour events: No acute events overnight.     Family members at bedside translated. Pt denies any palpitations, nausea/vomiting, SOB, or CP.     Tele reviewed Sinus 1st degree block HR 60-90s     MEDICATIONS:  aspirin enteric coated 81 milliGRAM(s) Oral daily  enoxaparin Injectable 40 milliGRAM(s) SubCutaneous daily  digoxin     Tablet 0.125 milliGRAM(s) Oral daily  enalapril 2.5 milliGRAM(s) Oral two times a day  metoprolol 75 milliGRAM(s) Oral every 12 hours  furosemide    Tablet 40 milliGRAM(s) Oral daily  spironolactone 25 milliGRAM(s) Oral two times a day        oxyCODONE    5 mG/acetaminophen 325 mG 1 Tablet(s) Oral every 4 hours PRN  oxyCODONE    5 mG/acetaminophen 325 mG 2 Tablet(s) Oral every 6 hours PRN      atorvastatin 80 milliGRAM(s) Oral at bedtime  dextrose Gel 1 Dose(s) Oral once PRN  dextrose 50% Injectable 12.5 Gram(s) IV Push once  dextrose 50% Injectable 25 Gram(s) IV Push once  dextrose 50% Injectable 25 Gram(s) IV Push once  glucagon  Injectable 1 milliGRAM(s) IntraMuscular once PRN  insulin lispro (HumaLOG) corrective regimen sliding scale   SubCutaneous three times a day before meals  insulin lispro (HumaLOG) corrective regimen sliding scale   SubCutaneous at bedtime  insulin glargine Injectable (LANTUS) 15 Unit(s) SubCutaneous at bedtime  insulin lispro Injectable (HumaLOG) 6 Unit(s) SubCutaneous three times a day before meals    sodium chloride 0.9%. 1000 milliLiter(s) IV Continuous <Continuous>  dextrose 5%. 1000 milliLiter(s) IV Continuous <Continuous>      REVIEW OF SYSTEMS:  Complete 10point ROS negative.    PHYSICAL EXAM:  T(C): 36.9 (08-07-17 @ 04:26), Max: 36.9 (08-07-17 @ 04:26)  HR: 87 (08-07-17 @ 04:26) (76 - 87)  BP: 109/63 (08-07-17 @ 04:26) (109/63 - 114/69)  RR: 19 (08-07-17 @ 04:26) (17 - 19)  SpO2: 99% (08-07-17 @ 04:26) (94% - 100%)  Wt(kg): --  I&O's Summary    06 Aug 2017 07:01  -  07 Aug 2017 07:00  --------------------------------------------------------  IN: 900 mL / OUT: 600 mL / NET: 300 mL        Appearance: Well appearing. resting comfortably in bed with family members at bedside, no distress  HEENT:  MMM OP clear	  Cardiovascular: Normal S1 S2, No JVD, No murmurs, No edema  Respiratory: Trace crackles at bases	  Psychiatry: Alert, Mood & affect appropriate  Gastrointestinal:  Soft, Non-tender, + BS	  Skin: No rashes, No ecchymoses, No cyanosis	  Neurologic: Non-focal  Extremities: Normal range of motion, No clubbing, cyanosis or edema  Vascular: Peripheral pulses palpable 2+ bilaterally      LABS:	 	    CBC Full  -  ( 07 Aug 2017 05:07 )  WBC Count : 10.9 K/uL  Hemoglobin : 10.6 g/dL  Hematocrit : 32.6 %  Platelet Count - Automated : 249 K/uL  Mean Cell Volume : 90.2 fl  Mean Cell Hemoglobin : 29.4 pg  Mean Cell Hemoglobin Concentration : 32.6 gm/dL      08-07    137  |  100  |  20  ----------------------------<  88  3.9   |  27  |  0.94  08-06    136  |  99  |  19  ----------------------------<  101<H>  3.9   |  25  |  0.94    Ca    8.2<L>      07 Aug 2017 05:07  Ca    8.3<L>      06 Aug 2017 05:24        TELEMETRY: 	  Tele reviewed Sinus 1st degree block HR 60-90s     [ ] Echocardiogram: < from: TTE with Doppler (w/Cont) (07.28.17 @ 14:08) >  Dimensions:    Normal Values:  LA:     4.3    2.0 - 4.0 cm  Ao:     3.5    2.0 - 3.8 cm  SEPTUM: 0.9    0.6 - 1.2 cm  PWT:    0.9    0.6 - 1.1 cm  LVIDd:  5.4    3.0 - 5.6 cm  LVIDs:  4.4    1.8 - 4.0 cm  Derived variables:  LVMI: 91 g/m2  RWT: 0.33  Fractional short: 19 %  EF (Visual Estimate): 20 %  Doppler Peak Velocity (m/sec): AoV=0.9    Conclusions:  1. Mildly dilated left atrium.  LA volume index = 35 cc/m2.  2. Severe segmental left ventricular systolic dysfunction.  Akinesis of the mid to apical anteroseptum, apex,  inferolateral wall.   Endocardial visualization enhanced  with intravenous injection of echo contrast (Definity).  Severe hypokinesis of the remaining segments.  3. Severe  diastolic dysfunction (Stage III).  4. Decreased right ventricular systolic function.  5. Right pleural effusion.  *** No previous Echo exam.    [ ]  Catheterization: 7/27 [facility] showing severe multi vessel CAD; pLAd 95% stenosis, mLAD 99% stenosis, OM1 99% stenosis, % stenosis. EF 35%  .  [ ] Stress Test: 7/24/17 which showed a severe fixed defect involving the mid to apical gayatri-septal wall and EF of 34%.  	  ASSESSMENT/PLAN: 	    67M w/DM, HTN, CAD, Anemia, MI (?2007) and sCHF transferred from [facility] for CABG eval where he was admitted for CHF exacerbation course c/b VF arrest now s/p 2vCABG  EP consulted for ICD evaluation.     #VF arrest with depressed EF 20%; VF arrest occurred pre re-vascularization  - Plan for EP study for further evaluation prior to ICD  - Optimal CHF meds and on dig 0.125mg qd and Metoprolol 75mg BID   - Will d/w Dr. Wang and update CTSx team @[id number] regarding timeline for procedure.

## 2017-08-07 NOTE — PROGRESS NOTE ADULT - SUBJECTIVE AND OBJECTIVE BOX
Chief complaint  Patient is a 67y old  Male who presents with a chief complaint of I need heart surgery (28 Jul 2017 00:49)   Review of systems    Patient in bed, comfortable, no fever,  no hypoglycemia.    Labs and Fingersticks    CAPILLARY BLOOD GLUCOSE  201 (07 Aug 2017 16:31)  104 (07 Aug 2017 11:00)  107 (07 Aug 2017 07:37)  140 (06 Aug 2017 21:24)  110 (06 Aug 2017 16:18)  173 (06 Aug 2017 11:00)  103 (06 Aug 2017 07:40)  201 (05 Aug 2017 21:40)  141 (05 Aug 2017 16:42)  191 (05 Aug 2017 11:00)  117 (05 Aug 2017 08:29)  69 (05 Aug 2017 08:00)  135 (04 Aug 2017 21:51)    Anion Gap, Serum: 10 (08-07 @ 05:07)  Anion Gap, Serum: 12 (08-06 @ 05:24)      Calcium, Total Serum: 8.2 <L> (08-07 @ 05:07)  Calcium, Total Serum: 8.3 <L> (08-06 @ 05:24)          08-07    137  |  100  |  20  ----------------------------<  88  3.9   |  27  |  0.94    Ca    8.2<L>      07 Aug 2017 05:07                          10.6   10.9  )-----------( 249      ( 07 Aug 2017 05:07 )             32.6     Medications  MEDICATIONS  (STANDING):  sodium chloride 0.9%. 1000 milliLiter(s) (10 mL/Hr) IV Continuous <Continuous>  aspirin enteric coated 81 milliGRAM(s) Oral daily  enoxaparin Injectable 40 milliGRAM(s) SubCutaneous daily  atorvastatin 80 milliGRAM(s) Oral at bedtime  dextrose 5%. 1000 milliLiter(s) (50 mL/Hr) IV Continuous <Continuous>  dextrose 50% Injectable 12.5 Gram(s) IV Push once  dextrose 50% Injectable 25 Gram(s) IV Push once  dextrose 50% Injectable 25 Gram(s) IV Push once  insulin lispro (HumaLOG) corrective regimen sliding scale   SubCutaneous three times a day before meals  insulin lispro (HumaLOG) corrective regimen sliding scale   SubCutaneous at bedtime  digoxin     Tablet 0.125 milliGRAM(s) Oral daily  enalapril 2.5 milliGRAM(s) Oral two times a day  metoprolol 75 milliGRAM(s) Oral every 12 hours  sodium chloride 0.9% lock flush 3 milliLiter(s) IV Push every 8 hours  insulin glargine Injectable (LANTUS) 15 Unit(s) SubCutaneous at bedtime  insulin lispro Injectable (HumaLOG) 6 Unit(s) SubCutaneous three times a day before meals  furosemide    Tablet 40 milliGRAM(s) Oral daily  spironolactone 25 milliGRAM(s) Oral two times a day      Physical Exam  General: Patient comfortable in bed  Vital Signs Last 12 Hrs  T(F): 99.3 (08-07-17 @ 12:00), Max: 99.3 (08-07-17 @ 12:00)  HR: 78 (08-07-17 @ 12:00) (78 - 78)  BP: 106/62 (08-07-17 @ 12:00) (106/62 - 106/62)  BP(mean): --  RR: 18 (08-07-17 @ 12:00) (18 - 18)  SpO2: 97% (08-07-17 @ 12:00) (97% - 97%)  Neck: No palpable thyroid nodules.  CVS: S1S2, No murmurs  Respiratory: No wheezing, no crepitations  GI: Abdomen soft, bowel sounds positive  Musculoskeletal: Positive edema lower extremities bilaterally  Skin: No skin rashes, no ecchimosis    Diagnostics    Thyroid Stimulating Hormone, Serum: AM Sched. Collection: 29-Jul-2017 06:00 (07-28 @ 11:45)  Free Thyroxine, Serum: AM Sched. Collection: 29-Jul-2017 06:00 (07-28 @ 11:45)

## 2017-08-07 NOTE — PROGRESS NOTE ADULT - ASSESSMENT
Assessment  DMT2: 67y Male with DM T2 with hyperglycemia, on basal/bolus insulin no further hypoglycemic events since lowered dose of insulin, eating meals

## 2017-08-07 NOTE — PROGRESS NOTE ADULT - SUBJECTIVE AND OBJECTIVE BOX
VITAL SIGNS    Telemetry:  rsr 60-90 1st degree  Vital Signs Last 24 Hrs  T(C): 37.4 (17 @ 12:00), Max: 37.4 (17 @ 12:00)  T(F): 99.3 (17 @ 12:00), Max: 99.3 (17 @ 12:00)  HR: 78 (17 @ 12:00) (76 - 87)  BP: 106/62 (17 @ 12:00) (106/62 - 114/69)  RR: 18 (17 @ 12:00) (18 - 19)  SpO2: 97% (17 @ 12:00) (97% - 100%)             @ 07:01  -   @ 07:00  --------------------------------------------------------  IN: 900 mL / OUT: 600 mL / NET: 300 mL     @ 07:01  -   @ 16:10  --------------------------------------------------------  IN: 360 mL / OUT: 300 mL / NET: 60 mL       Daily     Daily Weight in k.5 (07 Aug 2017 08:00)  Admit Wt: Drug Dosing Weight  Height (cm): 167.64 (2017 11:42)  Weight (kg): 90.1 (2017 11:42)  BMI (kg/m2): 32.1 (2017 11:42)  BSA (m2): 1.99 (2017 11:42)      CAPILLARY BLOOD GLUCOSE  104 (07 Aug 2017 11:00)  107 (07 Aug 2017 07:37)  140 (06 Aug 2017 21:24)  110 (06 Aug 2017 16:18)                PHYSICAL EXAM    Subjective: "Hi.   Neurology: alert and oriented x 3, nonfocal, no gross deficits  CV : tele:  rsr 60-90 1st degree  Sternal Wound :  CDI TIMOTHY   Lungs: clear diminished at the bases.  RR easy, unlabored   Abdomen: soft, nontender, nondistended, positive bowel sounds, + bowel movement   Neg N/V/D; obese abdomen   :  pt voiding without difficulty   Extremities:   RICHMOND; trace LE edema, neg calf tenderness.   PPP bilaterally ; left svg site cdi, timothy

## 2017-08-08 ENCOUNTER — TRANSCRIPTION ENCOUNTER (OUTPATIENT)
Age: 67
End: 2017-08-08

## 2017-08-08 LAB
ANION GAP SERPL CALC-SCNC: 12 MMOL/L — SIGNIFICANT CHANGE UP (ref 5–17)
BUN SERPL-MCNC: 18 MG/DL — SIGNIFICANT CHANGE UP (ref 7–23)
CALCIUM SERPL-MCNC: 8.3 MG/DL — LOW (ref 8.4–10.5)
CHLORIDE SERPL-SCNC: 99 MMOL/L — SIGNIFICANT CHANGE UP (ref 96–108)
CO2 SERPL-SCNC: 27 MMOL/L — SIGNIFICANT CHANGE UP (ref 22–31)
CREAT SERPL-MCNC: 0.93 MG/DL — SIGNIFICANT CHANGE UP (ref 0.5–1.3)
GLUCOSE SERPL-MCNC: 86 MG/DL — SIGNIFICANT CHANGE UP (ref 70–99)
HCT VFR BLD CALC: 32.5 % — LOW (ref 39–50)
HGB BLD-MCNC: 10.9 G/DL — LOW (ref 13–17)
MCHC RBC-ENTMCNC: 30.3 PG — SIGNIFICANT CHANGE UP (ref 27–34)
MCHC RBC-ENTMCNC: 33.6 GM/DL — SIGNIFICANT CHANGE UP (ref 32–36)
MCV RBC AUTO: 90.1 FL — SIGNIFICANT CHANGE UP (ref 80–100)
PLATELET # BLD AUTO: 267 K/UL — SIGNIFICANT CHANGE UP (ref 150–400)
POTASSIUM SERPL-MCNC: 3.8 MMOL/L — SIGNIFICANT CHANGE UP (ref 3.5–5.3)
POTASSIUM SERPL-SCNC: 3.8 MMOL/L — SIGNIFICANT CHANGE UP (ref 3.5–5.3)
RBC # BLD: 3.6 M/UL — LOW (ref 4.2–5.8)
RBC # FLD: 16.7 % — HIGH (ref 10.3–14.5)
SODIUM SERPL-SCNC: 138 MMOL/L — SIGNIFICANT CHANGE UP (ref 135–145)
WBC # BLD: 11 K/UL — HIGH (ref 3.8–10.5)
WBC # FLD AUTO: 11 K/UL — HIGH (ref 3.8–10.5)

## 2017-08-08 PROCEDURE — 93620 COMP EP EVL R AT VEN PAC&REC: CPT | Mod: 26

## 2017-08-08 PROCEDURE — 99233 SBSQ HOSP IP/OBS HIGH 50: CPT | Mod: GC,25

## 2017-08-08 RX ORDER — POTASSIUM CHLORIDE 20 MEQ
20 PACKET (EA) ORAL ONCE
Qty: 0 | Refills: 0 | Status: COMPLETED | OUTPATIENT
Start: 2017-08-08 | End: 2017-08-08

## 2017-08-08 RX ADMIN — SPIRONOLACTONE 25 MILLIGRAM(S): 25 TABLET, FILM COATED ORAL at 05:51

## 2017-08-08 RX ADMIN — Medication 75 MILLIGRAM(S): at 17:30

## 2017-08-08 RX ADMIN — SPIRONOLACTONE 25 MILLIGRAM(S): 25 TABLET, FILM COATED ORAL at 17:30

## 2017-08-08 RX ADMIN — Medication 2.5 MILLIGRAM(S): at 05:51

## 2017-08-08 RX ADMIN — Medication 2.5 MILLIGRAM(S): at 17:31

## 2017-08-08 RX ADMIN — Medication 0.12 MILLIGRAM(S): at 05:51

## 2017-08-08 RX ADMIN — Medication 40 MILLIGRAM(S): at 05:51

## 2017-08-08 RX ADMIN — Medication 6 UNIT(S): at 16:50

## 2017-08-08 RX ADMIN — SODIUM CHLORIDE 3 MILLILITER(S): 9 INJECTION INTRAMUSCULAR; INTRAVENOUS; SUBCUTANEOUS at 14:16

## 2017-08-08 RX ADMIN — Medication 81 MILLIGRAM(S): at 14:14

## 2017-08-08 RX ADMIN — Medication 20 MILLIEQUIVALENT(S): at 08:52

## 2017-08-08 RX ADMIN — SODIUM CHLORIDE 3 MILLILITER(S): 9 INJECTION INTRAMUSCULAR; INTRAVENOUS; SUBCUTANEOUS at 22:03

## 2017-08-08 RX ADMIN — Medication 75 MILLIGRAM(S): at 05:51

## 2017-08-08 RX ADMIN — ATORVASTATIN CALCIUM 80 MILLIGRAM(S): 80 TABLET, FILM COATED ORAL at 22:09

## 2017-08-08 RX ADMIN — SODIUM CHLORIDE 3 MILLILITER(S): 9 INJECTION INTRAMUSCULAR; INTRAVENOUS; SUBCUTANEOUS at 05:11

## 2017-08-08 RX ADMIN — INSULIN GLARGINE 15 UNIT(S): 100 INJECTION, SOLUTION SUBCUTANEOUS at 22:09

## 2017-08-08 NOTE — DISCHARGE NOTE ADULT - CARE PROVIDER_API CALL
Trung Roa), Surgery; Thoracic and Cardiac Surgery  [address] [place] [address]  Phone: [phone]  Fax: [phone]    Latasha Wang), Cardiac Electrophysiology; Cardiovascular Disease  [address] [place], [place] [id number]  Phone: [phone]  Fax: [phone]

## 2017-08-08 NOTE — PROGRESS NOTE ADULT - SUBJECTIVE AND OBJECTIVE BOX
Chief complaint  Patient is a 67y old  Male who presents with a chief complaint of ohs (08 Aug 2017 13:01)   Review of systems  Patient in bed, comfortable, no fever, no hypoglycemia.    Labs and Fingersticks    CAPILLARY BLOOD GLUCOSE  90 (08 Aug 2017 11:08)  93 (08 Aug 2017 07:19)  150 (07 Aug 2017 21:26)  201 (07 Aug 2017 16:31)  104 (07 Aug 2017 11:00)  107 (07 Aug 2017 07:37)  140 (06 Aug 2017 21:24)  110 (06 Aug 2017 16:18)  173 (06 Aug 2017 11:00)  103 (06 Aug 2017 07:40)  201 (05 Aug 2017 21:40)  141 (05 Aug 2017 16:42)    Anion Gap, Serum: 12 (08-08 @ 04:57)  Anion Gap, Serum: 10 (08-07 @ 05:07)      Calcium, Total Serum: 8.3 <L> (08-08 @ 04:57)  Calcium, Total Serum: 8.2 <L> (08-07 @ 05:07)          08-08    138  |  99  |  18  ----------------------------<  86  3.8   |  27  |  0.93    Ca    8.3<L>      08 Aug 2017 04:57                          10.9   11.0  )-----------( 267      ( 08 Aug 2017 04:57 )             32.5     Medications  MEDICATIONS  (STANDING):  sodium chloride 0.9%. 1000 milliLiter(s) (10 mL/Hr) IV Continuous <Continuous>  aspirin enteric coated 81 milliGRAM(s) Oral daily  enoxaparin Injectable 40 milliGRAM(s) SubCutaneous daily  atorvastatin 80 milliGRAM(s) Oral at bedtime  dextrose 5%. 1000 milliLiter(s) (50 mL/Hr) IV Continuous <Continuous>  dextrose 50% Injectable 12.5 Gram(s) IV Push once  dextrose 50% Injectable 25 Gram(s) IV Push once  dextrose 50% Injectable 25 Gram(s) IV Push once  insulin lispro (HumaLOG) corrective regimen sliding scale   SubCutaneous three times a day before meals  insulin lispro (HumaLOG) corrective regimen sliding scale   SubCutaneous at bedtime  digoxin     Tablet 0.125 milliGRAM(s) Oral daily  enalapril 2.5 milliGRAM(s) Oral two times a day  metoprolol 75 milliGRAM(s) Oral every 12 hours  sodium chloride 0.9% lock flush 3 milliLiter(s) IV Push every 8 hours  insulin glargine Injectable (LANTUS) 15 Unit(s) SubCutaneous at bedtime  insulin lispro Injectable (HumaLOG) 6 Unit(s) SubCutaneous three times a day before meals  furosemide    Tablet 40 milliGRAM(s) Oral daily  spironolactone 25 milliGRAM(s) Oral two times a day      Physical Exam  General: Patient comfortable in bed  Vital Signs Last 12 Hrs  T(F): 98.1 (08-08-17 @ 13:46), Max: 98.9 (08-08-17 @ 04:23)  HR: 85 (08-08-17 @ 14:20) (81 - 87)  BP: 118/71 (08-08-17 @ 14:20) (111/64 - 121/74)  BP(mean): --  RR: 16 (08-08-17 @ 14:20) (16 - 17)  SpO2: 96% (08-08-17 @ 14:20) (94% - 96%)  Neck: No palpable thyroid nodules.  CVS: S1S2, No murmurs  Respiratory: No wheezing, no crepitations  GI: Abdomen soft, bowel sounds positive  Musculoskeletal: Positive edema lower extremities bilaterally  Skin: No skin rashes, no ecchimosis    Diagnostics    Thyroid Stimulating Hormone, Serum: AM Sched. Collection: 29-Jul-2017 06:00 (07-28 @ 11:45)  Free Thyroxine, Serum: AM Sched. Collection: 29-Jul-2017 06:00 (07-28 @ 11:45)

## 2017-08-08 NOTE — DISCHARGE NOTE ADULT - HOSPITAL COURSE
68 y/o M w/ PMH of DM, HTN, CAD, Anemia, MI (?2007) and CHF transferred from [facility] with triple vessel disease. Pt was admitted to [place] for an acute CHF exacerbation and was diuresed with IV Lasix BID. During hospitalization in [facility], he had a stress test on 7/24/17 which showed a severe fixed defect involving the mid to apical gayatri-septal wall and EF of 34%. His admission was complicated by Vfib cardiac arrest and ROSC was achieved after 12 minutes after 3 doses of epinephrine and defibrillation. Patient was then transferred to CCU at that time after ROSC and intubation. Pt was extubated in CCU and echo showed EF 35-40%, severe hypokinesis of anterior/septal/apical walls with severe diastolic dysfunction. Patient was transfused 1 unit PRBC for anemia and was started on iron supplementation. Pt transferred to [facility] cath lab for angiogram 7/27/17. Patient now transferred to [facility] CCU for close monitoring. s/p LHC showing severe multi vessel CAD; pLAd 95% stenosis, mLAD 99% stenosis, OM1 99% stenosis, % stenosis. EF 35%. Patient transferred to [facility] for CTS service for CABG evaluation 7/27 with Dr. Roa   s/p  7/31 C2L with pre-op IABP   endo consulted for uncontrolled dm- aic 10- pt on lantus and humalog as per endo   postop cardiogenic shock requiring inotropic and pressor support;  7/31 RTOR 8/1 for bleeding. +copious amounts of clot removed from pericardium  eps consulted for preop vfib arrest- eps study in am 8/8 with Dr. Wang   8/4 tx 2cohen  8/8 positive EP study inducable VF scheduled for  aicd 8/9/17 8/9 AICD placement with Dr. Wang 68 y/o M w/ PMH of DM, HTN, CAD, Anemia, MI (?2007) and CHF transferred from [facility] with triple vessel disease. Pt was admitted to [place] for an acute CHF exacerbation and was diuresed with IV Lasix BID. During hospitalization in [facility], he had a stress test on 7/24/17 which showed a severe fixed defect involving the mid to apical gayatri-septal wall and EF of 34%. His admission was complicated by Vfib cardiac arrest and ROSC was achieved after 12 minutes after 3 doses of epinephrine and defibrillation. Patient was then transferred to CCU at that time after ROSC and intubation. Pt was extubated in CCU and echo showed EF 35-40%, severe hypokinesis of anterior/septal/apical walls with severe diastolic dysfunction. Patient was transfused 1 unit PRBC for anemia and was started on iron supplementation. Pt transferred to [facility] cath lab for angiogram 7/27/17. Patient now transferred to [facility] CCU for close monitoring. s/p LHC showing severe multi vessel CAD; pLAd 95% stenosis, mLAD 99% stenosis, OM1 99% stenosis, % stenosis. EF 35%. Patient transferred to [facility] for CTS service for CABG evaluation 7/27 with Dr. Roa   s/p  7/31 C2L with pre-op IABP   endo consulted for uncontrolled dm- aic 10- pt on lantus and humalog as per endo   postop cardiogenic shock requiring inotropic and pressor support;  7/31 RTOR 8/1 for bleeding. +copious amounts of clot removed from pericardium  eps consulted for preop vfib arrest- eps study in am 8/8 with Dr. Wang   8/4 tx 2cohen  8/8 positive EP study inducable VF scheduled for  aicd 8/9/17 8/9 AICD placement with Dr. Wang     Medically stable for routine home discharge. Midthoracic CDI, chest tube sites with staples, left leg CDI, aicd site LACW CDI with dermabond. left arm pit vesicle/blister intact. Pt received endocrine consult and being discharge on oral agents due to hx of poor compliance with insulin administration. Apt with Dr Roa for 8/23/17 2 pm

## 2017-08-08 NOTE — PROGRESS NOTE ADULT - SUBJECTIVE AND OBJECTIVE BOX
Cardiovascular Disease Progress Note    Overnight events: No acute events overnight. Mr. Lundberg feels well. He denies chest pain or SOB.  Otherwise review of systems negative    Objective Findings:  T(C): 37.2 (17 @ 04:23), Max: 37.4 (17 @ 12:00)  HR: 87 (17 @ 04:23) (78 - 87)  BP: 111/64 (17 @ 04:23) (106/62 - 113/69)  RR: 17 (17 @ 04:23) (17 - 18)  SpO2: 96% (17 @ 04:23) (96% - 97%)  Wt(kg): --  Daily     Daily Weight in k.3 (08 Aug 2017 07:19)      Physical Exam:  Gen: NAD  HEENT: EOMI  CV: RRR, normal S1 + S2, no m/r/g  Lungs: CTAB  Abd: soft, non-tender  Ext: No edema    Telemetry: Sinus; no ectopy    Laboratory Data:                        10.9   11.0  )-----------( 267      ( 08 Aug 2017 04:57 )             32.5     -    138  |  99  |  18  ----------------------------<  86  3.8   |  27  |  0.93    Ca    8.3<L>      08 Aug 2017 04:57                Inpatient Medications:  MEDICATIONS  (STANDING):  sodium chloride 0.9%. 1000 milliLiter(s) (10 mL/Hr) IV Continuous <Continuous>  aspirin enteric coated 81 milliGRAM(s) Oral daily  enoxaparin Injectable 40 milliGRAM(s) SubCutaneous daily  atorvastatin 80 milliGRAM(s) Oral at bedtime  dextrose 5%. 1000 milliLiter(s) (50 mL/Hr) IV Continuous <Continuous>  dextrose 50% Injectable 12.5 Gram(s) IV Push once  dextrose 50% Injectable 25 Gram(s) IV Push once  dextrose 50% Injectable 25 Gram(s) IV Push once  insulin lispro (HumaLOG) corrective regimen sliding scale   SubCutaneous three times a day before meals  insulin lispro (HumaLOG) corrective regimen sliding scale   SubCutaneous at bedtime  digoxin     Tablet 0.125 milliGRAM(s) Oral daily  enalapril 2.5 milliGRAM(s) Oral two times a day  metoprolol 75 milliGRAM(s) Oral every 12 hours  sodium chloride 0.9% lock flush 3 milliLiter(s) IV Push every 8 hours  insulin glargine Injectable (LANTUS) 15 Unit(s) SubCutaneous at bedtime  insulin lispro Injectable (HumaLOG) 6 Unit(s) SubCutaneous three times a day before meals  furosemide    Tablet 40 milliGRAM(s) Oral daily  spironolactone 25 milliGRAM(s) Oral two times a day      Assessment: 67 year old man with uncontrolled DM and multivessel CAD now s/p CABG    #Multivessel CAD- s/p 2V CABG with LIMA to LAD and SVG to OM.    Tolerating ASA, beta-blocker, enalapril and high intensity statin. Ambulating.      #Acute on chronic systolic CHF- due to ischemic cardiomyopathy. Now off milrinone. Tolerating low dose enalapril, metoprolol, spironolactone, and furosemide. EP study today for possible ICD in the setting of severe LV dysfunction with h/o VF arrest.      #Uncontrolled DM II- basal/bolus insulin. Endo input appreciated    Over 35 minutes spent on total encounter; more than 50% of the visit was spent counseling and/or coordinating care by the attending physician.      Brayan Devlin M.D.   Cardiovascular Disease  [phone] Cardiovascular Disease Progress Note    Overnight events: No acute events overnight. Mr. Lundberg feels well. He denies chest pain or SOB.  Otherwise review of systems negative    Objective Findings:  T(C): 37.2 (17 @ 04:23), Max: 37.4 (17 @ 12:00)  HR: 87 (17 @ 04:23) (78 - 87)  BP: 111/64 (17 @ 04:23) (106/62 - 113/69)  RR: 17 (17 @ 04:23) (17 - 18)  SpO2: 96% (17 @ 04:23) (96% - 97%)  Wt(kg): --  Daily     Daily Weight in k.3 (08 Aug 2017 07:19)      Physical Exam:  Gen: NAD  HEENT: EOMI  CV: RRR, normal S1 + S2, no m/r/g  Lungs: CTAB  Abd: soft, non-tender  Ext: No edema    Telemetry: Sinus; no ectopy    Laboratory Data:                        10.9   11.0  )-----------( 267      ( 08 Aug 2017 04:57 )             32.5     -    138  |  99  |  18  ----------------------------<  86  3.8   |  27  |  0.93    Ca    8.3<L>      08 Aug 2017 04:57                Inpatient Medications:  MEDICATIONS  (STANDING):  sodium chloride 0.9%. 1000 milliLiter(s) (10 mL/Hr) IV Continuous <Continuous>  aspirin enteric coated 81 milliGRAM(s) Oral daily  enoxaparin Injectable 40 milliGRAM(s) SubCutaneous daily  atorvastatin 80 milliGRAM(s) Oral at bedtime  dextrose 5%. 1000 milliLiter(s) (50 mL/Hr) IV Continuous <Continuous>  dextrose 50% Injectable 12.5 Gram(s) IV Push once  dextrose 50% Injectable 25 Gram(s) IV Push once  dextrose 50% Injectable 25 Gram(s) IV Push once  insulin lispro (HumaLOG) corrective regimen sliding scale   SubCutaneous three times a day before meals  insulin lispro (HumaLOG) corrective regimen sliding scale   SubCutaneous at bedtime  digoxin     Tablet 0.125 milliGRAM(s) Oral daily  enalapril 2.5 milliGRAM(s) Oral two times a day  metoprolol 75 milliGRAM(s) Oral every 12 hours  sodium chloride 0.9% lock flush 3 milliLiter(s) IV Push every 8 hours  insulin glargine Injectable (LANTUS) 15 Unit(s) SubCutaneous at bedtime  insulin lispro Injectable (HumaLOG) 6 Unit(s) SubCutaneous three times a day before meals  furosemide    Tablet 40 milliGRAM(s) Oral daily  spironolactone 25 milliGRAM(s) Oral two times a day      Assessment: 67 year old man with uncontrolled DM and multivessel CAD now s/p CABG    #Multivessel CAD- s/p 2V CABG with LIMA to LAD and SVG to OM.    Tolerating ASA, beta-blocker, enalapril and high intensity statin. Ambulating.      #Acute on chronic systolic CHF- due to ischemic cardiomyopathy. Approaching euvolemia.  Tolerating low dose enalapril, metoprolol, spironolactone, and furosemide.   EP study today for possible ICD in the setting of severe LV dysfunction with h/o VF arrest.      #Uncontrolled DM II- basal/bolus insulin. Endo input appreciated    Over 35 minutes spent on total encounter; more than 50% of the visit was spent counseling and/or coordinating care by the attending physician.      Brayan Devlin M.D.   Cardiovascular Disease  [phone]

## 2017-08-08 NOTE — PROGRESS NOTE ADULT - ASSESSMENT
68 y/o M w/ PMH of DM, HTN, CAD, Anemia, MI (?2007) and CHF transferred from [facility] with triple vessel disease. Pt was admitted to [place] for an acute CHF exacerbation and was diuresed with IV Lasix BID. During hospitalization in [facility], he had a stress test on 7/24/17 which showed a severe fixed defect involving the mid to apical gayatri-septal wall and EF of 34%. His admission was complicated by Vfib cardiac arrest and ROSC was achieved after 12 minutes after 3 doses of epinephrine and defibrillation. Patient was then transferred to CCU at that time after ROSC and intubation. Pt was extubated in CCU and echo showed EF 35-40%, severe hypokinesis of anterior/septal/apical walls with severe diastolic dysfunction. Patient was transfused 1 unit PRBC for anemia and was started on iron supplementation. Pt transferred to [facility] cath lab for angiogram 7/27/17. Patient now transferred to [facility] CCU for close monitoring. s/p LHC showing severe multi vessel CAD; pLAd 95% stenosis, mLAD 99% stenosis, OM1 99% stenosis, % stenosis. EF 35%. Patient transferred to [facility] for CTS service for CABG evaluation 7/27 with Dr. Roa   s/p  7/31 C2L with pre-op IABP   endo consulted for uncontrolled dm- aic 10- pt on lantus and humalog as per endo   postop cardiogenic shock requiring inotropic and pressor support;  7/31 RTOR 8/1 for bleeding. +copious amounts of clot removed from pericardium  eps consulted for preop vfib arrest- eps study in am 8/8 with Dr. Wang   8/4 tx 2cohen  8/8 positive EP study inducable VF scheduled ro aicd 8/9/17  discharge planning- home pt

## 2017-08-08 NOTE — DISCHARGE NOTE ADULT - ADDITIONAL INSTRUCTIONS
Follow up with Dr. Patino at the medical clinic on 8/16/17 at 2:30pm: ([address] [place] [address]) [phone], 1st floor, room 102 Follow up with Dr. Patino at the medical clinic on 8/16/17 at 2:30pm: ([address] [place] [address]) [phone], 1st floor, room 102  You have an appointment for AICD device check at 8/30/17 at 9 am at 1st floor [address] [facility] [address] [place] [phone]  You have an appointment with Dr Roa on Monday Wednesday 8/23/17 2 pm call office [phone] if you have to reschedule

## 2017-08-08 NOTE — DISCHARGE NOTE ADULT - OTHER SIGNIFICANT FINDINGS
Telemetry SR 1st degree 70-90 with 7 beast WCT  Vital Signs Last 24 Hrs  T(C): 36.6 (11 Aug 2017 05:08), Max: 36.9 (10 Aug 2017 13:31)  T(F): 97.8 (11 Aug 2017 05:08), Max: 98.5 (10 Aug 2017 13:31)  HR: 81 (11 Aug 2017 05:08) (75 - 86)  BP: 129/68 (11 Aug 2017 05:08) (108/56 - 134/74)  BP(mean): --  RR: 18 (11 Aug 2017 05:08) (18 - 18)  SpO2: 96% (11 Aug 2017 05:08) (96% - 97%)    Subjective:  Neurology: alert and oriented x 3, nonfocal, no gross deficits  CV : s1s1 reg no MRGS  Sternal Wound :  CDI , Stable pw removed prior to discharge . Midthoracic CDI, chest tube sites with staples, I, aicd site LACW CDI with dermabond. left arm pit vesicle/blister intact.  Lungs: CTA, equal chest expansion  Abdomen: soft, nontender, nondistended, positive bowel sounds, last bowel movement   :    voids  Extremities:   left leg CDI  +1 BLLE edema,  +dp b/l , no calt tenderness b/l , warm and perfused b/l

## 2017-08-08 NOTE — DISCHARGE NOTE ADULT - PLAN OF CARE
full recovery from CABGx2 1. Daily Shower  2. Weight yourself daily and notify any weight gain greater than 2-3 pounds in 24 hours.  3. Regular diet - low fat, low cholesterol, no added salt.  4. Cleanse Midsternal incision and leg incision daily while showering with warm water and mild soap, pat dry and maintain open to air.   5. Follow Cardiac Surgery Do's and Don'ts discharge instructions.   6. No driving until cleared by MD.   7. No heavy lifting nothing greater than 5 pounds until cleared by MD.   8. Call / Notify MD any fever greater than 101.0  9. Increase Activity as tolerated. Your hemoglobin A1C will be between 7-8 Continue to follow with your primary care MD or your endocrinologist.  Follow a heart healthy diabetic diet. If you check your fingerstick glucose at home, call your MD if it is greater than 250mg/dL on 2 occasions or less than 100mg/dL on 2 occasions. Know signs of low blood sugar, such as: dizziness, shakiness, sweating, confusion, hunger, nervousness-drink 4 ounces apple juice if occurs and call your doctor. Know early signs of high blood sugar, such as: frequent urination, increased thirst, blurry vision, fatigue, headache - call your doctor if this occurs. Follow with other practitioners to care for your diabetes, such as ophthamologist and podiatrist.

## 2017-08-08 NOTE — DISCHARGE NOTE ADULT - NS AS ACTIVITY OBS
Stairs allowed/Walking-Outdoors allowed/No Heavy lifting/straining/Showering allowed/Do not drive or operate machinery

## 2017-08-08 NOTE — PROGRESS NOTE ADULT - SUBJECTIVE AND OBJECTIVE BOX
Date of Admission: 8/28/17    24H hour events: No acute events overnight.     This am, he denies any CP, palpitations, n/v/, or SOB.     MEDICATIONS:  aspirin enteric coated 81 milliGRAM(s) Oral daily  enoxaparin Injectable 40 milliGRAM(s) SubCutaneous daily  digoxin     Tablet 0.125 milliGRAM(s) Oral daily  enalapril 2.5 milliGRAM(s) Oral two times a day  metoprolol 75 milliGRAM(s) Oral every 12 hours  furosemide    Tablet 40 milliGRAM(s) Oral daily  spironolactone 25 milliGRAM(s) Oral two times a day        oxyCODONE    5 mG/acetaminophen 325 mG 1 Tablet(s) Oral every 4 hours PRN  oxyCODONE    5 mG/acetaminophen 325 mG 2 Tablet(s) Oral every 6 hours PRN      atorvastatin 80 milliGRAM(s) Oral at bedtime  dextrose Gel 1 Dose(s) Oral once PRN  dextrose 50% Injectable 12.5 Gram(s) IV Push once  dextrose 50% Injectable 25 Gram(s) IV Push once  dextrose 50% Injectable 25 Gram(s) IV Push once  glucagon  Injectable 1 milliGRAM(s) IntraMuscular once PRN  insulin lispro (HumaLOG) corrective regimen sliding scale   SubCutaneous three times a day before meals  insulin lispro (HumaLOG) corrective regimen sliding scale   SubCutaneous at bedtime  insulin glargine Injectable (LANTUS) 15 Unit(s) SubCutaneous at bedtime  insulin lispro Injectable (HumaLOG) 6 Unit(s) SubCutaneous three times a day before meals    sodium chloride 0.9%. 1000 milliLiter(s) IV Continuous <Continuous>  dextrose 5%. 1000 milliLiter(s) IV Continuous <Continuous>      REVIEW OF SYSTEMS:  Complete 10point ROS negative.    PHYSICAL EXAM:  T(C): 37.2 (08-08-17 @ 04:23), Max: 37.4 (08-07-17 @ 12:00)  HR: 87 (08-08-17 @ 04:23) (78 - 87)  BP: 111/64 (08-08-17 @ 04:23) (106/62 - 113/69)  RR: 17 (08-08-17 @ 04:23) (17 - 18)  SpO2: 96% (08-08-17 @ 04:23) (96% - 97%)  Wt(kg): --  I&O's Summary    07 Aug 2017 07:01  -  08 Aug 2017 07:00  --------------------------------------------------------  IN: 410 mL / OUT: 300 mL / NET: 110 mL        Appearance: Well appearing, sitting up in a chair at bedside, no distress  HEENT:  MMM OP clear	  Cardiovascular: Normal S1 S2, No JVD, No murmurs, No edema  Respiratory: Trace crackles at bases	  Psychiatry: Alert, Mood & affect appropriate  Gastrointestinal:  Soft, Non-tender, + BS	  Skin: No rashes, No ecchymoses, No cyanosis	  Neurologic: Non-focal  Extremities: Normal range of motion, No clubbing, cyanosis or edema  Vascular: Peripheral pulses palpable 2+ bilaterally        LABS:	 	    CBC Full  -  ( 08 Aug 2017 04:57 )  WBC Count : 11.0 K/uL  Hemoglobin : 10.9 g/dL  Hematocrit : 32.5 %  Platelet Count - Automated : 267 K/uL  Mean Cell Volume : 90.1 fl  Mean Cell Hemoglobin : 30.3 pg  Mean Cell Hemoglobin Concentration : 33.6 gm/dL      08-08    138  |  99  |  18  ----------------------------<  86  3.8   |  27  |  0.93  08-07    137  |  100  |  20  ----------------------------<  88  3.9   |  27  |  0.94    Ca    8.3<L>      08 Aug 2017 04:57  Ca    8.2<L>      07 Aug 2017 05:07    TELEMETRY: 	Reviewed; Sinus 60-90  [ ] Echocardiogram: < from: TTE with Doppler (w/Cont) (07.28.17 @ 14:08) >  Dimensions:    Normal Values:  LA:     4.3    2.0 - 4.0 cm  Ao:     3.5    2.0 - 3.8 cm  SEPTUM: 0.9    0.6 - 1.2 cm  PWT:    0.9    0.6 - 1.1 cm  LVIDd:  5.4    3.0 - 5.6 cm  LVIDs:  4.4    1.8 - 4.0 cm  Derived variables:  LVMI: 91 g/m2  RWT: 0.33  Fractional short: 19 %  EF (Visual Estimate): 20 %  Doppler Peak Velocity (m/sec): AoV=0.9    Conclusions:  1. Mildly dilated left atrium.  LA volume index = 35 cc/m2.  2. Severe segmental left ventricular systolic dysfunction.  Akinesis of the mid to apical anteroseptum, apex,  inferolateral wall.   Endocardial visualization enhanced  with intravenous injection of echo contrast (Definity).  Severe hypokinesis of the remaining segments.  3. Severe  diastolic dysfunction (Stage III).  4. Decreased right ventricular systolic function.  5. Right pleural effusion.  *** No previous Echo exam.    [ ]  Catheterization: 7/27 [facility] showing severe multi vessel CAD; pLAd 95% stenosis, mLAD 99% stenosis, OM1 99% stenosis, % stenosis. EF 35%  .  [ ] Stress Test: 7/24/17 which showed a severe fixed defect involving the mid to apical gayatri-septal wall and EF of 34%.  	  ASSESSMENT/PLAN: 	    67M w/DM, HTN, CAD, Anemia, MI (?2007) and sCHF transferred from [facility] for CABG eval where he was admitted for CHF exacerbation course c/b VF arrest now s/p 2vCABG  EP consulted for ICD evaluation.     #VF arrest with depressed EF 20%; VF arrest occurred pre re-vascularization  - Discussed risks/benefits with the patient via [place] ; Patient understood the risks/benefits and consented to the EP study. Plan for EP study today 8/8  - Optimal CHF meds and on dig 0.125mg qd and Metoprolol 75mg BID   - Plan d/w Dr. Wang

## 2017-08-08 NOTE — DISCHARGE NOTE ADULT - MEDICATION SUMMARY - MEDICATIONS TO TAKE
I will START or STAY ON the medications listed below when I get home from the hospital:    spironolactone 25 mg oral tablet  -- 1 tab(s) by mouth once a day  -- Indication: For Acute systolic congestive heart failure    aspirin 81 mg oral tablet  -- 1 tab(s) by mouth once a day  -- Indication: For Coronary artery disease involving native coronary artery of native heart with other form of angina pectoris    acetaminophen-oxycodone 325 mg-5 mg oral tablet  -- 1 tab(s) by mouth every 6 hours, As Needed, Moderate Pain (4 - 6) MDD:4  -- Indication: For Pain    valsartan 40 mg oral tablet  -- 1 tab(s) by mouth once a day  -- Indication: For Acute systolic congestive heart failure    digoxin 125 mcg (0.125 mg) oral tablet  -- 1 tab(s) by mouth once a day  -- Indication: For Acute systolic congestive heart failure    metFORMIN 1000 mg oral tablet  -- 1 tab(s) by mouth 2 times a day  -- Indication: For diabetes    glipiZIDE 5 mg oral tablet  -- 1 tab(s) by mouth once a day  -- Indication: For diabeteas    Lipitor 80 mg oral tablet  -- 1 tab(s) by mouth once a day  -- Indication: For Coronary artery disease involving native coronary artery of native heart with other form of angina pectoris    Toprol-XL 25 mg oral tablet, extended release  -- 1 tab(s) by mouth once a day  -- Indication: For Coronary artery disease involving native coronary artery of native heart with other form of angina pectoris    furosemide 40 mg oral tablet  -- 1 tab(s) by mouth once a day  -- Indication: For Hypervolemia, unspecified hypervolemia type    ferrous sulfate 325 mg (65 mg elemental iron) oral delayed release tablet  -- 1 tab(s) by mouth 2 times a day (with meals)  -- Indication: For Anemia, unspecified type    K-Tab 10 mEq oral tablet, extended release  -- 1 tab(s) by mouth once a day  -- It is very important that you take or use this exactly as directed.  Do not skip doses or discontinue unless directed by your doctor.  Medication should be taken with plenty of water.  Take with food or milk.    -- Indication: For Electrolyte supplement

## 2017-08-08 NOTE — CHART NOTE - NSCHARTNOTEFT_GEN_A_CORE
67 year old male pt w/ PMH of DM, HTN, CAD, Anemia, MI and CHF transferred from [facility] with triple vessel disease, is s/p CABG 7/31/17.    Source: Patient [x]    Family [ ]     other [x] electronic medical records    Diet : Consistent CHO no snacks, DASH/TLC, NPO after midnight tonight    Patient reports [ ] nausea  [ ] vomiting [ ] diarrhea [ ] constipation  [ ]chewing problems [ ] swallowing issues  [x] other: Pt denies GI distress     PO intake:  < 50% [ ] 50-75% [ ]   % [x]  other :     Source for PO intake [x] Patient [ ] family [x] chart [ ] staff [ ] other     Enteral /Parenteral Nutrition: n/a    Current Weight: 188 pounds (current, standing, +1 edema B/L leg, ankles). Current wt consistent with admission wt.    Pertinent Medications: MEDICATIONS  (STANDING):  sodium chloride 0.9%. 1000 milliLiter(s) (10 mL/Hr) IV Continuous <Continuous>  aspirin enteric coated 81 milliGRAM(s) Oral daily  enoxaparin Injectable 40 milliGRAM(s) SubCutaneous daily  atorvastatin 80 milliGRAM(s) Oral at bedtime  dextrose 5%. 1000 milliLiter(s) (50 mL/Hr) IV Continuous <Continuous>  dextrose 50% Injectable 12.5 Gram(s) IV Push once  dextrose 50% Injectable 25 Gram(s) IV Push once  dextrose 50% Injectable 25 Gram(s) IV Push once  insulin lispro (HumaLOG) corrective regimen sliding scale   SubCutaneous three times a day before meals  insulin lispro (HumaLOG) corrective regimen sliding scale   SubCutaneous at bedtime  digoxin     Tablet 0.125 milliGRAM(s) Oral daily  enalapril 2.5 milliGRAM(s) Oral two times a day  metoprolol 75 milliGRAM(s) Oral every 12 hours  sodium chloride 0.9% lock flush 3 milliLiter(s) IV Push every 8 hours  insulin glargine Injectable (LANTUS) 15 Unit(s) SubCutaneous at bedtime  insulin lispro Injectable (HumaLOG) 6 Unit(s) SubCutaneous three times a day before meals  furosemide    Tablet 40 milliGRAM(s) Oral daily  spironolactone 25 milliGRAM(s) Oral two times a day    MEDICATIONS  (PRN):  oxyCODONE    5 mG/acetaminophen 325 mG 1 Tablet(s) Oral every 4 hours PRN Mild Pain  oxyCODONE    5 mG/acetaminophen 325 mG 2 Tablet(s) Oral every 6 hours PRN Moderate Pain  dextrose Gel 1 Dose(s) Oral once PRN Blood Glucose LESS THAN 70 milliGRAM(s)/deciLiter  glucagon  Injectable 1 milliGRAM(s) IntraMuscular once PRN Glucose <70 milliGRAM(s)/deciLiter    Pertinent Labs:  08-08 Na138 mmol/L Glu 86 mg/dL K+ 3.8 mmol/L Cr  0.93 mg/dL BUN 18 mg/dL    Skin: No pressue ulcers    Estimated Needs:   [x] no change since previous assessment  [ ] recalculated:       Previous Nutrition Diagnosis: Increased nutrient needs    Nutrition Diagnosis is [x] ongoing s/p cardiac surgery  [ ] resolved [ ] not applicable     New Nutrition Diagnosis: [x] not applicable     Interventions:     1) Recommend continue current consistent CHO DASH/TLC diet for blood glucose control and heart-health  2)      Monitoring and Evaluation:     [x] PO intake [x] Tolerance to diet prescription [x] weights [x] follow up per protocol    [ ] other: 67 year old male pt w/ PMH of DM, HTN, CAD, Anemia, MI and CHF transferred from [facility] with triple vessel disease, is s/p CABG 7/31/17. Pt seen, ate 75% of meal, feels well denies complaints at this time. Pt refuses  services, daughter at bedside to translate. Pt amenable to T2DM diet education at this time for HGA1c >10%.    Source: Patient [x]    Family [ ]     other [x] electronic medical records    Diet : Consistent CHO no snacks, DASH/TLC, NPO after midnight tonight    Patient reports [ ] nausea  [ ] vomiting [ ] diarrhea [ ] constipation  [ ]chewing problems [ ] swallowing issues  [x] other: Pt denies GI distress     PO intake:  < 50% [ ] 50-75% [ ]   % [x]  other :     Source for PO intake [x] Patient [ ] family [x] chart [ ] staff [ ] other     Enteral /Parenteral Nutrition: n/a    Current Weight: 188 pounds (current, standing, +1 edema B/L leg, ankles). Current wt consistent with admission wt.    Pertinent Medications: MEDICATIONS  (STANDING):  sodium chloride 0.9%. 1000 milliLiter(s) (10 mL/Hr) IV Continuous <Continuous>  aspirin enteric coated 81 milliGRAM(s) Oral daily  enoxaparin Injectable 40 milliGRAM(s) SubCutaneous daily  atorvastatin 80 milliGRAM(s) Oral at bedtime  dextrose 5%. 1000 milliLiter(s) (50 mL/Hr) IV Continuous <Continuous>  dextrose 50% Injectable 12.5 Gram(s) IV Push once  dextrose 50% Injectable 25 Gram(s) IV Push once  dextrose 50% Injectable 25 Gram(s) IV Push once  insulin lispro (HumaLOG) corrective regimen sliding scale   SubCutaneous three times a day before meals  insulin lispro (HumaLOG) corrective regimen sliding scale   SubCutaneous at bedtime  digoxin     Tablet 0.125 milliGRAM(s) Oral daily  enalapril 2.5 milliGRAM(s) Oral two times a day  metoprolol 75 milliGRAM(s) Oral every 12 hours  sodium chloride 0.9% lock flush 3 milliLiter(s) IV Push every 8 hours  insulin glargine Injectable (LANTUS) 15 Unit(s) SubCutaneous at bedtime  insulin lispro Injectable (HumaLOG) 6 Unit(s) SubCutaneous three times a day before meals  furosemide    Tablet 40 milliGRAM(s) Oral daily  spironolactone 25 milliGRAM(s) Oral two times a day    MEDICATIONS  (PRN):  oxyCODONE    5 mG/acetaminophen 325 mG 1 Tablet(s) Oral every 4 hours PRN Mild Pain  oxyCODONE    5 mG/acetaminophen 325 mG 2 Tablet(s) Oral every 6 hours PRN Moderate Pain  dextrose Gel 1 Dose(s) Oral once PRN Blood Glucose LESS THAN 70 milliGRAM(s)/deciLiter  glucagon  Injectable 1 milliGRAM(s) IntraMuscular once PRN Glucose <70 milliGRAM(s)/deciLiter    Pertinent Labs:  08-08 Na138 mmol/L Glu 86 mg/dL K+ 3.8 mmol/L Cr  0.93 mg/dL BUN 18 mg/dL    Skin: No pressue ulcers    Estimated Needs:   [x] no change since previous assessment  [ ] recalculated:     Previous Nutrition Diagnosis: Increased nutrient needs    Nutrition Diagnosis is [x] ongoing s/p cardiac surgery  [ ] resolved [ ] not applicable     New Nutrition Diagnosis: Nutrition-related knowledge deficit r/t limited prior exposure to nutrition-related education PTA as evidenced by pt/daughter amenable to T2DM education     Interventions:     1) Recommend continue current consistent CHO DASH/TLC diet for blood glucose control and heart-health. Pt's food preferences obtained.  2) Educated pt/daughter on T2DM management/diet, discussed CHO consistent diet using My Plate model of portion control, reviewed CHO food sources, limiting intake of sugar-sweetened beverages, adequate intake of low-starch vegetables, SMBG, goal fingersticks ranges, importance of blood glucose control and risks of uncontrolled DM. Pt given written educational materials upon last RD visit.  3) RD remains available     Monitoring and Evaluation:     [x] PO intake [x] Tolerance to diet prescription [x] weights [x] follow up per protocol    [ ] other:

## 2017-08-08 NOTE — DISCHARGE NOTE ADULT - MEDICATION SUMMARY - MEDICATIONS TO STOP TAKING
I will STOP taking the medications listed below when I get home from the hospital:    Plavix 75 mg oral tablet  -- 1 tab(s) by mouth once a day    Lantus 100 units/mL subcutaneous solution  -- 17 unit(s) subcutaneous once a day (at bedtime)    metoprolol tartrate 25 mg oral tablet  -- 1 tab(s) by mouth 2 times a day    potassium chloride 20 mEq oral tablet, extended release  -- 2 tab(s) by mouth every 4 hours    sodium chloride 0.9% lock flush  -- 3 milliliter(s) intravenous every 8 hours

## 2017-08-08 NOTE — DISCHARGE NOTE ADULT - CARE PLAN
Principal Discharge DX:	S/P CABG x 2  Goal:	full recovery from CABGx2  Instructions for follow-up, activity and diet:	1. Daily Shower  2. Weight yourself daily and notify any weight gain greater than 2-3 pounds in 24 hours.  3. Regular diet - low fat, low cholesterol, no added salt.  4. Cleanse Midsternal incision and leg incision daily while showering with warm water and mild soap, pat dry and maintain open to air.   5. Follow Cardiac Surgery Do's and Don'ts discharge instructions.   6. No driving until cleared by MD.   7. No heavy lifting nothing greater than 5 pounds until cleared by MD.   8. Call / Notify MD any fever greater than 101.0  9. Increase Activity as tolerated.  Secondary Diagnosis:	Type 2 diabetes mellitus with hyperglycemia, with long-term current use of insulin  Goal:	Your hemoglobin A1C will be between 7-8  Instructions for follow-up, activity and diet:	Continue to follow with your primary care MD or your endocrinologist.  Follow a heart healthy diabetic diet. If you check your fingerstick glucose at home, call your MD if it is greater than 250mg/dL on 2 occasions or less than 100mg/dL on 2 occasions. Know signs of low blood sugar, such as: dizziness, shakiness, sweating, confusion, hunger, nervousness-drink 4 ounces apple juice if occurs and call your doctor. Know early signs of high blood sugar, such as: frequent urination, increased thirst, blurry vision, fatigue, headache - call your doctor if this occurs. Follow with other practitioners to care for your diabetes, such as ophthamologist and podiatrist.

## 2017-08-08 NOTE — DISCHARGE NOTE ADULT - PATIENT PORTAL LINK FT
“You can access the Follow[employer] Patient Portal, offered by [facility], by registering with the following website: [email or web address]”

## 2017-08-08 NOTE — PROGRESS NOTE ADULT - PROBLEM SELECTOR PLAN 2
inducable VF during EPS aicd schedule for 8/9/17 with Dr AMELIA Wang  C/w beta blocker, ace inhibitor, digoxin,   diuresis

## 2017-08-09 DIAGNOSIS — E87.70 FLUID OVERLOAD, UNSPECIFIED: ICD-10-CM

## 2017-08-09 LAB
ANION GAP SERPL CALC-SCNC: 12 MMOL/L — SIGNIFICANT CHANGE UP (ref 5–17)
BUN SERPL-MCNC: 20 MG/DL — SIGNIFICANT CHANGE UP (ref 7–23)
CALCIUM SERPL-MCNC: 8.7 MG/DL — SIGNIFICANT CHANGE UP (ref 8.4–10.5)
CHLORIDE SERPL-SCNC: 98 MMOL/L — SIGNIFICANT CHANGE UP (ref 96–108)
CO2 SERPL-SCNC: 26 MMOL/L — SIGNIFICANT CHANGE UP (ref 22–31)
CREAT SERPL-MCNC: 0.9 MG/DL — SIGNIFICANT CHANGE UP (ref 0.5–1.3)
GLUCOSE SERPL-MCNC: 171 MG/DL — HIGH (ref 70–99)
HCT VFR BLD CALC: 34.9 % — LOW (ref 39–50)
HGB BLD-MCNC: 11.4 G/DL — LOW (ref 13–17)
MCHC RBC-ENTMCNC: 29.2 PG — SIGNIFICANT CHANGE UP (ref 27–34)
MCHC RBC-ENTMCNC: 32.6 GM/DL — SIGNIFICANT CHANGE UP (ref 32–36)
MCV RBC AUTO: 89.5 FL — SIGNIFICANT CHANGE UP (ref 80–100)
PLATELET # BLD AUTO: 291 K/UL — SIGNIFICANT CHANGE UP (ref 150–400)
POTASSIUM SERPL-MCNC: 4.2 MMOL/L — SIGNIFICANT CHANGE UP (ref 3.5–5.3)
POTASSIUM SERPL-SCNC: 4.2 MMOL/L — SIGNIFICANT CHANGE UP (ref 3.5–5.3)
RBC # BLD: 3.9 M/UL — LOW (ref 4.2–5.8)
RBC # FLD: 16.6 % — HIGH (ref 10.3–14.5)
SODIUM SERPL-SCNC: 136 MMOL/L — SIGNIFICANT CHANGE UP (ref 135–145)
WBC # BLD: 10.6 K/UL — HIGH (ref 3.8–10.5)
WBC # FLD AUTO: 10.6 K/UL — HIGH (ref 3.8–10.5)

## 2017-08-09 PROCEDURE — 33249 INSJ/RPLCMT DEFIB W/LEAD(S): CPT | Mod: 58,Q0

## 2017-08-09 PROCEDURE — 93010 ELECTROCARDIOGRAM REPORT: CPT

## 2017-08-09 PROCEDURE — 71010: CPT | Mod: 26

## 2017-08-09 RX ORDER — VANCOMYCIN HCL 1 G
1000 VIAL (EA) INTRAVENOUS ONCE
Qty: 0 | Refills: 0 | Status: DISCONTINUED | OUTPATIENT
Start: 2017-08-09 | End: 2017-08-09

## 2017-08-09 RX ADMIN — Medication 40 MILLIGRAM(S): at 05:39

## 2017-08-09 RX ADMIN — Medication 2.5 MILLIGRAM(S): at 05:39

## 2017-08-09 RX ADMIN — SODIUM CHLORIDE 3 MILLILITER(S): 9 INJECTION INTRAMUSCULAR; INTRAVENOUS; SUBCUTANEOUS at 15:00

## 2017-08-09 RX ADMIN — Medication 2.5 MILLIGRAM(S): at 21:59

## 2017-08-09 RX ADMIN — ATORVASTATIN CALCIUM 80 MILLIGRAM(S): 80 TABLET, FILM COATED ORAL at 22:00

## 2017-08-09 RX ADMIN — Medication 0.12 MILLIGRAM(S): at 05:39

## 2017-08-09 RX ADMIN — SODIUM CHLORIDE 3 MILLILITER(S): 9 INJECTION INTRAMUSCULAR; INTRAVENOUS; SUBCUTANEOUS at 05:44

## 2017-08-09 RX ADMIN — Medication 75 MILLIGRAM(S): at 21:59

## 2017-08-09 RX ADMIN — INSULIN GLARGINE 15 UNIT(S): 100 INJECTION, SOLUTION SUBCUTANEOUS at 22:00

## 2017-08-09 RX ADMIN — Medication 75 MILLIGRAM(S): at 05:39

## 2017-08-09 RX ADMIN — SODIUM CHLORIDE 3 MILLILITER(S): 9 INJECTION INTRAMUSCULAR; INTRAVENOUS; SUBCUTANEOUS at 22:01

## 2017-08-09 RX ADMIN — SPIRONOLACTONE 25 MILLIGRAM(S): 25 TABLET, FILM COATED ORAL at 22:00

## 2017-08-09 RX ADMIN — SPIRONOLACTONE 25 MILLIGRAM(S): 25 TABLET, FILM COATED ORAL at 05:39

## 2017-08-09 NOTE — PROGRESS NOTE ADULT - ASSESSMENT
66 y/o M w/ PMH of DM, HTN, CAD, Anemia, MI (?2007) and CHF transferred from [facility] with triple vessel disease. Pt was admitted to [facility] for an acute CHF exacerbation and was diuresed with IV Lasix BID. During hospitalization in [facility], he had a stress test on 7/24/17 which showed a severe fixed defect involving the mid to apical gayatri-septal wall and EF of 34%. His admission was complicated by Vfib cardiac arrest and ROSC was achieved after 12 minutes after 3 doses of epinephrine and defibrillation. Patient was then transferred to CCU at that time after ROSC and intubation. Pt was extubated in CCU and echo showed EF 35-40%, severe hypokinesis of anterior/septal/apical walls with severe diastolic dysfunction. Patient was transfused 1 unit PRBC for anemia and was started on iron supplementation. Pt transferred to [facility] cath lab for angiogram 7/27/17. Patient now transferred to [facility] CCU for close monitoring. s/p LHC showing severe multi vessel CAD; pLAd 95% stenosis, mLAD 99% stenosis, OM1 99% stenosis, % stenosis. EF 35%. Patient transferred to [facility] for CTS service for CABG evaluation 7/27 with Dr. Roa   s/p  7/31 C2L with pre-op IABP   endo consulted for uncontrolled dm- aic 10- pt on lantus and humalog as per endo   postop cardiogenic shock requiring inotropic and pressor support;  7/31 RTOR 8/1 for bleeding. +copious amounts of clot removed from pericardium  eps consulted for preop vfib arrest- eps study in am 8/8 with Dr. Wang   8/4 tx 2cohen  8/8 positive EP study inducable VF scheduled for  aicd 8/9/17 8/9 AICD planned with Dr. Wang   discharge planning- home pt thur if stable

## 2017-08-09 NOTE — PROGRESS NOTE ADULT - SUBJECTIVE AND OBJECTIVE BOX
Cardiovascular Disease Progress Note    Overnight events: No acute events overnight. Mr. Lundberg feels well. He denies chest pain or SOB.    Otherwise review of systems negative    Objective Findings:  T(C): 36.8 (17 @ 05:31), Max: 37 (17 @ 20:09)  HR: 84 (17 @ 05:31) (76 - 86)  BP: 116/69 (17 @ 05:31) (116/69 - 126/72)  RR: 18 (17 @ 05:31) (16 - 18)  SpO2: 97% (17 @ 05:31) (94% - 97%)  Wt(kg): --  Daily     Daily Weight in k.4 (09 Aug 2017 08:36)      Physical Exam:  Gen: NAD  HEENT: EOMI  CV: RRR, normal S1 + S2, no m/r/g  Lungs: CTAB  Abd: soft, non-tender  Ext: No edema    Telemetry: Sinus 80s; no ectopy    Laboratory Data:                        11.4   10.6  )-----------( 291      ( 09 Aug 2017 06:11 )             34.9     -    136  |  98  |  20  ----------------------------<  171<H>  4.2   |  26  |  0.90    Ca    8.7      09 Aug 2017 06:11                Inpatient Medications:  MEDICATIONS  (STANDING):  sodium chloride 0.9%. 1000 milliLiter(s) (10 mL/Hr) IV Continuous <Continuous>  aspirin enteric coated 81 milliGRAM(s) Oral daily  atorvastatin 80 milliGRAM(s) Oral at bedtime  dextrose 5%. 1000 milliLiter(s) (50 mL/Hr) IV Continuous <Continuous>  dextrose 50% Injectable 12.5 Gram(s) IV Push once  dextrose 50% Injectable 25 Gram(s) IV Push once  dextrose 50% Injectable 25 Gram(s) IV Push once  insulin lispro (HumaLOG) corrective regimen sliding scale   SubCutaneous three times a day before meals  insulin lispro (HumaLOG) corrective regimen sliding scale   SubCutaneous at bedtime  digoxin     Tablet 0.125 milliGRAM(s) Oral daily  enalapril 2.5 milliGRAM(s) Oral two times a day  metoprolol 75 milliGRAM(s) Oral every 12 hours  sodium chloride 0.9% lock flush 3 milliLiter(s) IV Push every 8 hours  insulin glargine Injectable (LANTUS) 15 Unit(s) SubCutaneous at bedtime  insulin lispro Injectable (HumaLOG) 6 Unit(s) SubCutaneous three times a day before meals  furosemide    Tablet 40 milliGRAM(s) Oral daily  spironolactone 25 milliGRAM(s) Oral two times a day      Assessment: 67 year old man with uncontrolled DM and multivessel CAD now s/p CABG    #Multivessel CAD- s/p 2V CABG with LIMA to LAD and SVG to OM.    Tolerating ASA, beta-blocker, enalapril and high intensity statin. Ambulating.      #Acute on chronic systolic CHF- due to ischemic cardiomyopathy. Approaching euvolemia.  Tolerating low dose enalapril, metoprolol, spironolactone, and furosemide.   EP study positive for inducible arrhythmia on 2017. Plan for AICD today.     #Uncontrolled DM II- basal/bolus insulin. Endo input appreciated      Over 35 minutes spent on total encounter; more than 50% of the visit was spent counseling and/or coordinating care by the attending physician.      Brayan Devlin M.D.   Cardiovascular Disease  [phone]

## 2017-08-09 NOTE — PROGRESS NOTE ADULT - SUBJECTIVE AND OBJECTIVE BOX
VITAL SIGNS    Telemetry:  rsr -  Vital Signs Last 24 Hrs  T(C): 36.8 (17 @ 05:31), Max: 37 (17 @ 20:09)  T(F): 98.2 (17 @ 05:31), Max: 98.6 (17 @ 20:09)  HR: 84 (17 @ 05:31) (76 - 86)  BP: 116/69 (17 @ 05:31) (116/69 - 126/72)  RR: 18 (17 @ 05:31) (16 - 18)  SpO2: 97% (17 @ 05:31) (94% - 97%)             @ 07:01  -   @ 07:00  --------------------------------------------------------  IN: 360 mL / OUT: 350 mL / NET: 10 mL     @ 07:01  -   @ 11:27  --------------------------------------------------------  IN: 0 mL / OUT: 0 mL / NET: 0 mL       Daily     Daily Weight in k.4 (09 Aug 2017 08:36)  Admit Wt: Drug Dosing Weight  Height (cm): 167.64 (2017 11:42)  Weight (kg): 90.1 (2017 11:42)  BMI (kg/m2): 32.1 (2017 11:42)  BSA (m2): 1.99 (2017 11:42)      CAPILLARY BLOOD GLUCOSE  166 (09 Aug 2017 07:17)  202 (08 Aug 2017 21:47)  148 (08 Aug 2017 16:14)                PHYSICAL EXAM    Subjective: "Hi.   Neurology: alert and oriented x 3, nonfocal, no gross deficits  CV : tele:   rsr 1st 80-90  Sternal Wound :  CDI TIMOTHY   Lungs: clear. RR easy, unlabored   Abdomen: soft, nontender, nondistended, positive bowel sounds, + bowel movement   Neg N/V/D; obese abdomen; +ct staples intact   :  pt voiding without difficulty   Extremities:   RICHMOND; trace LE edema, neg calf tenderness.   PPP bilaterally;. left svg site cdi timothy

## 2017-08-09 NOTE — PROGRESS NOTE ADULT - SUBJECTIVE AND OBJECTIVE BOX
Date of Admission: 7/28/17     24H hour events:  No acute events overnight.     EP study done 8/8 positive for inducible arrhythmia.     This am, he denied any CP, palpitations, nausea/vomiting, SOB, or abdominal discomfort.       MEDICATIONS:  aspirin enteric coated 81 milliGRAM(s) Oral daily  digoxin     Tablet 0.125 milliGRAM(s) Oral daily  enalapril 2.5 milliGRAM(s) Oral two times a day  metoprolol 75 milliGRAM(s) Oral every 12 hours  furosemide    Tablet 40 milliGRAM(s) Oral daily  spironolactone 25 milliGRAM(s) Oral two times a day    atorvastatin 80 milliGRAM(s) Oral at bedtime  dextrose Gel 1 Dose(s) Oral once PRN  dextrose 50% Injectable 12.5 Gram(s) IV Push once  dextrose 50% Injectable 25 Gram(s) IV Push once  dextrose 50% Injectable 25 Gram(s) IV Push once  glucagon  Injectable 1 milliGRAM(s) IntraMuscular once PRN  insulin lispro (HumaLOG) corrective regimen sliding scale   SubCutaneous three times a day before meals  insulin lispro (HumaLOG) corrective regimen sliding scale   SubCutaneous at bedtime  insulin glargine Injectable (LANTUS) 15 Unit(s) SubCutaneous at bedtime  insulin lispro Injectable (HumaLOG) 6 Unit(s) SubCutaneous three times a day before meals    sodium chloride 0.9%. 1000 milliLiter(s) IV Continuous <Continuous>  dextrose 5%. 1000 milliLiter(s) IV Continuous <Continuous>      REVIEW OF SYSTEMS:  Complete 10point ROS negative.    PHYSICAL EXAM:  T(C): 36.8 (08-09-17 @ 05:31), Max: 37 (08-08-17 @ 20:09)  HR: 84 (08-09-17 @ 05:31) (76 - 86)  BP: 116/69 (08-09-17 @ 05:31) (116/69 - 126/72)  RR: 18 (08-09-17 @ 05:31) (16 - 18)  SpO2: 97% (08-09-17 @ 05:31) (94% - 97%)  Wt(kg): --  I&O's Summary    08 Aug 2017 07:01  -  09 Aug 2017 07:00  --------------------------------------------------------  IN: 360 mL / OUT: 350 mL / NET: 10 mL        Appearance: Well appearing, sitting up in a chair at bedside  HEENT:   MMM OP clear  Lymphatic: No lymphadenopathy  Cardiovascular: Normal S1 S2, No JVD, No murmurs, No edema, sternal wound healing well.   Respiratory: Lungs clear to auscultation	  Psychiatry: Mood & affect appropriate  Gastrointestinal:  Soft, Non-tender, + BS	  Skin: No rashes, No ecchymoses, No cyanosis	  Neurologic: Non-focal  Extremities: No clubbing, cyanosis or edema  Vascular: Peripheral pulses palpable bilaterally      LABS:	 	    CBC Full  -  ( 09 Aug 2017 06:11 )  WBC Count : 10.6 K/uL  Hemoglobin : 11.4 g/dL  Hematocrit : 34.9 %  Platelet Count - Automated : 291 K/uL  Mean Cell Volume : 89.5 fl  Mean Cell Hemoglobin : 29.2 pg  Mean Cell Hemoglobin Concentration : 32.6 gm/dL      08-09    136  |  98  |  20  ----------------------------<  171<H>  4.2   |  26  |  0.90  08-08    138  |  99  |  18  ----------------------------<  86  3.8   |  27  |  0.93    Ca    8.7      09 Aug 2017 06:11  Ca    8.3<L>      08 Aug 2017 04:57    TELEMETRY:  Reviewed Sinus 70-90s  	  	  ASSESSMENT/PLAN: 	    67M w/DM, HTN, CAD, Anemia, MI (?2007) and sCHF transferred from [facility] for CABG eval where he was admitted for CHF exacerbation course c/b VF arrest now s/p 2vCABG  w/EP study positive with inducible arrhythmia with plan for AICD implantation.     #VF arrest with depressed EF 20%; VF arrest occurred pre re-vascularization  - Discussed risks/benefits of ICD implantation with the patient via [employer] ; Patient understood the risks/benefits and consented to the ICD implantation.   - Optimal CHF meds and on Lopressor 75mg BID and dig 0.125mg qd   - Will d/w Dr. Wang     [id number]

## 2017-08-09 NOTE — PROGRESS NOTE ADULT - SUBJECTIVE AND OBJECTIVE BOX
Chief complaint  Patient is a 67y old  Male who presents with a chief complaint of ohs (08 Aug 2017 13:01)   Review of systems  Patient in bed, comfortable, no fever, no hypoglycemia.    Labs and Fingersticks    CAPILLARY BLOOD GLUCOSE  133 (09 Aug 2017 16:39)  154 (09 Aug 2017 11:22)  166 (09 Aug 2017 07:17)  202 (08 Aug 2017 21:47)  148 (08 Aug 2017 16:14)  90 (08 Aug 2017 11:08)  93 (08 Aug 2017 07:19)  150 (07 Aug 2017 21:26)  201 (07 Aug 2017 16:31)  104 (07 Aug 2017 11:00)  107 (07 Aug 2017 07:37)  140 (06 Aug 2017 21:24)    Anion Gap, Serum: 12 (08-09 @ 06:11)  Anion Gap, Serum: 12 (08-08 @ 04:57)      Calcium, Total Serum: 8.7 (08-09 @ 06:11)  Calcium, Total Serum: 8.3 <L> (08-08 @ 04:57)          08-09    136  |  98  |  20  ----------------------------<  171<H>  4.2   |  26  |  0.90    Ca    8.7      09 Aug 2017 06:11                          11.4   10.6  )-----------( 291      ( 09 Aug 2017 06:11 )             34.9     Medications  MEDICATIONS  (STANDING):  sodium chloride 0.9%. 1000 milliLiter(s) (10 mL/Hr) IV Continuous <Continuous>  aspirin enteric coated 81 milliGRAM(s) Oral daily  atorvastatin 80 milliGRAM(s) Oral at bedtime  dextrose 5%. 1000 milliLiter(s) (50 mL/Hr) IV Continuous <Continuous>  dextrose 50% Injectable 12.5 Gram(s) IV Push once  dextrose 50% Injectable 25 Gram(s) IV Push once  dextrose 50% Injectable 25 Gram(s) IV Push once  insulin lispro (HumaLOG) corrective regimen sliding scale   SubCutaneous three times a day before meals  insulin lispro (HumaLOG) corrective regimen sliding scale   SubCutaneous at bedtime  digoxin     Tablet 0.125 milliGRAM(s) Oral daily  enalapril 2.5 milliGRAM(s) Oral two times a day  metoprolol 75 milliGRAM(s) Oral every 12 hours  sodium chloride 0.9% lock flush 3 milliLiter(s) IV Push every 8 hours  insulin glargine Injectable (LANTUS) 15 Unit(s) SubCutaneous at bedtime  insulin lispro Injectable (HumaLOG) 6 Unit(s) SubCutaneous three times a day before meals  furosemide    Tablet 40 milliGRAM(s) Oral daily  spironolactone 25 milliGRAM(s) Oral two times a day      Physical Exam  General: Patient comfortable in bed  Vital Signs Last 12 Hrs  T(F): 98.1 (08-09-17 @ 12:48), Max: 98.1 (08-09-17 @ 12:48)  HR: 72 (08-09-17 @ 13:48) (70 - 72)  BP: 103/66 (08-09-17 @ 13:48) (102/63 - 103/66)  BP(mean): --  RR: 18 (08-09-17 @ 12:48) (18 - 18)  SpO2: 98% (08-09-17 @ 13:48) (97% - 98%)  Neck: No palpable thyroid nodules.  CVS: S1S2, No murmurs  Respiratory: No wheezing, no crepitations  GI: Abdomen soft, bowel sounds positive  Musculoskeletal: Positive edema lower extremities bilaterally  Skin: No skin rashes, no ecchimosis    Diagnostics    Thyroid Stimulating Hormone, Serum: AM Sched. Collection: 29-Jul-2017 06:00 (07-28 @ 11:45)  Free Thyroxine, Serum: AM Sched. Collection: 29-Jul-2017 06:00 (07-28 @ 11:45)

## 2017-08-10 DIAGNOSIS — I46.9 CARDIAC ARREST, CAUSE UNSPECIFIED: ICD-10-CM

## 2017-08-10 LAB
ANION GAP SERPL CALC-SCNC: 13 MMOL/L — SIGNIFICANT CHANGE UP (ref 5–17)
BUN SERPL-MCNC: 19 MG/DL — SIGNIFICANT CHANGE UP (ref 7–23)
CALCIUM SERPL-MCNC: 8.4 MG/DL — SIGNIFICANT CHANGE UP (ref 8.4–10.5)
CHLORIDE SERPL-SCNC: 96 MMOL/L — SIGNIFICANT CHANGE UP (ref 96–108)
CO2 SERPL-SCNC: 24 MMOL/L — SIGNIFICANT CHANGE UP (ref 22–31)
CREAT SERPL-MCNC: 0.79 MG/DL — SIGNIFICANT CHANGE UP (ref 0.5–1.3)
GLUCOSE SERPL-MCNC: 193 MG/DL — HIGH (ref 70–99)
HCT VFR BLD CALC: 34 % — LOW (ref 39–50)
HGB BLD-MCNC: 11.2 G/DL — LOW (ref 13–17)
MCHC RBC-ENTMCNC: 29.7 PG — SIGNIFICANT CHANGE UP (ref 27–34)
MCHC RBC-ENTMCNC: 33.1 GM/DL — SIGNIFICANT CHANGE UP (ref 32–36)
MCV RBC AUTO: 89.7 FL — SIGNIFICANT CHANGE UP (ref 80–100)
PLATELET # BLD AUTO: 307 K/UL — SIGNIFICANT CHANGE UP (ref 150–400)
POTASSIUM SERPL-MCNC: 4 MMOL/L — SIGNIFICANT CHANGE UP (ref 3.5–5.3)
POTASSIUM SERPL-SCNC: 4 MMOL/L — SIGNIFICANT CHANGE UP (ref 3.5–5.3)
RBC # BLD: 3.79 M/UL — LOW (ref 4.2–5.8)
RBC # FLD: 16.6 % — HIGH (ref 10.3–14.5)
SODIUM SERPL-SCNC: 133 MMOL/L — LOW (ref 135–145)
WBC # BLD: 9.2 K/UL — SIGNIFICANT CHANGE UP (ref 3.8–10.5)
WBC # FLD AUTO: 9.2 K/UL — SIGNIFICANT CHANGE UP (ref 3.8–10.5)

## 2017-08-10 RX ORDER — OXYCODONE AND ACETAMINOPHEN 5; 325 MG/1; MG/1
1 TABLET ORAL EVERY 4 HOURS
Qty: 0 | Refills: 0 | Status: DISCONTINUED | OUTPATIENT
Start: 2017-08-10 | End: 2017-08-11

## 2017-08-10 RX ORDER — VANCOMYCIN HCL 1 G
1000 VIAL (EA) INTRAVENOUS EVERY 8 HOURS
Qty: 0 | Refills: 0 | Status: DISCONTINUED | OUTPATIENT
Start: 2017-08-10 | End: 2017-08-10

## 2017-08-10 RX ORDER — VANCOMYCIN HCL 1 G
1000 VIAL (EA) INTRAVENOUS EVERY 12 HOURS
Qty: 0 | Refills: 0 | Status: DISCONTINUED | OUTPATIENT
Start: 2017-08-10 | End: 2017-08-11

## 2017-08-10 RX ADMIN — Medication 2.5 MILLIGRAM(S): at 06:43

## 2017-08-10 RX ADMIN — Medication 75 MILLIGRAM(S): at 22:03

## 2017-08-10 RX ADMIN — SODIUM CHLORIDE 3 MILLILITER(S): 9 INJECTION INTRAMUSCULAR; INTRAVENOUS; SUBCUTANEOUS at 14:42

## 2017-08-10 RX ADMIN — Medication 40 MILLIGRAM(S): at 06:43

## 2017-08-10 RX ADMIN — SODIUM CHLORIDE 3 MILLILITER(S): 9 INJECTION INTRAMUSCULAR; INTRAVENOUS; SUBCUTANEOUS at 22:03

## 2017-08-10 RX ADMIN — Medication 2.5 MILLIGRAM(S): at 17:12

## 2017-08-10 RX ADMIN — SPIRONOLACTONE 25 MILLIGRAM(S): 25 TABLET, FILM COATED ORAL at 06:43

## 2017-08-10 RX ADMIN — Medication 0.12 MILLIGRAM(S): at 06:43

## 2017-08-10 RX ADMIN — SODIUM CHLORIDE 3 MILLILITER(S): 9 INJECTION INTRAMUSCULAR; INTRAVENOUS; SUBCUTANEOUS at 06:45

## 2017-08-10 RX ADMIN — Medication 81 MILLIGRAM(S): at 12:06

## 2017-08-10 RX ADMIN — Medication 1: at 07:37

## 2017-08-10 RX ADMIN — Medication 1: at 17:13

## 2017-08-10 RX ADMIN — Medication 6 UNIT(S): at 17:13

## 2017-08-10 RX ADMIN — Medication 2: at 12:05

## 2017-08-10 RX ADMIN — Medication 6 UNIT(S): at 12:05

## 2017-08-10 RX ADMIN — Medication 6 UNIT(S): at 07:38

## 2017-08-10 RX ADMIN — ATORVASTATIN CALCIUM 80 MILLIGRAM(S): 80 TABLET, FILM COATED ORAL at 22:03

## 2017-08-10 RX ADMIN — INSULIN GLARGINE 15 UNIT(S): 100 INJECTION, SOLUTION SUBCUTANEOUS at 22:03

## 2017-08-10 RX ADMIN — SPIRONOLACTONE 25 MILLIGRAM(S): 25 TABLET, FILM COATED ORAL at 17:12

## 2017-08-10 RX ADMIN — Medication 250 MILLIGRAM(S): at 06:43

## 2017-08-10 RX ADMIN — Medication 250 MILLIGRAM(S): at 17:12

## 2017-08-10 RX ADMIN — Medication 75 MILLIGRAM(S): at 06:43

## 2017-08-10 NOTE — PROGRESS NOTE ADULT - ASSESSMENT
67M T2DM, HTN, CAD, Anemia, MI (?2007) and CHF transferred from [facility] for CABG eval where he was admitted for CHF exacerbation course c/b VF arrest now s/p 2 CABG on 7/31, and ICD implant 8/9 for secondary prevention

## 2017-08-10 NOTE — PROGRESS NOTE ADULT - PROBLEM SELECTOR PLAN 4
C/w GI/DVT prophylaxis  C/w pain control  C/w glycemic control  C/w OOB, incentive spirometry, ambulation as tolerated.
C/w iron, folate.
C/w iron, folate.
Will continue statin, primary team FU
C/w iron, folate.
Will continue statin, primary team FU

## 2017-08-10 NOTE — PROGRESS NOTE ADULT - ASSESSMENT
68 y/o M w/ PMH of DM, HTN, CAD, Anemia, MI (?2007) and CHF transferred from [facility] with triple vessel disease. Pt was admitted to [place] for an acute CHF exacerbation and was diuresed with IV Lasix BID. During hospitalization in [facility], he had a stress test on 7/24/17 which showed a severe fixed defect involving the mid to apical gayatri-septal wall and EF of 34%. His admission was complicated by Vfib cardiac arrest and ROSC was achieved after 12 minutes after 3 doses of epinephrine and defibrillation. Patient was then transferred to CCU at that time after ROSC and intubation. Pt was extubated in CCU and echo showed EF 35-40%, severe hypokinesis of anterior/septal/apical walls with severe diastolic dysfunction. Patient was transfused 1 unit PRBC for anemia and was started on iron supplementation. Pt transferred to [facility] cath lab for angiogram 7/27/17. Patient now transferred to [facility] CCU for close monitoring. s/p LHC showing severe multi vessel CAD; pLAd 95% stenosis, mLAD 99% stenosis, OM1 99% stenosis, % stenosis. EF 35%. Patient transferred to [facility] for CTS service for CABG evaluation 7/27 with Dr. Roa   s/p  7/31 C2L with pre-op IABP   endo consulted for uncontrolled dm- aic 10- pt on lantus and humalog as per endo   postop cardiogenic shock requiring inotropic and pressor support;  7/31 RTOR 8/1 for bleeding. +copious amounts of clot removed from pericardium  eps consulted for preop vfib arrest- eps study in am 8/8 with Dr. Wang   8/4 tx 2cohen  8/8 positive EP study inducable VF scheduled for  aicd 8/9/17 8/9 AICD planned with Dr. Wang   discharge planning- home pt Fri 8/10/17 if stable

## 2017-08-10 NOTE — PROGRESS NOTE ADULT - SUBJECTIVE AND OBJECTIVE BOX
Chief complaint  Patient is a 67y old  Male who presents with a chief complaint of ohs (08 Aug 2017 13:01)   Review of systems  Patient in bed, comfortable, no fever,  eating meals, no hypoglycemia.    Labs and Fingersticks    CAPILLARY BLOOD GLUCOSE  165 (10 Aug 2017 16:07)  221 (10 Aug 2017 11:01)  164 (10 Aug 2017 07:28)  106 (09 Aug 2017 21:45)  133 (09 Aug 2017 16:39)  154 (09 Aug 2017 11:22)  166 (09 Aug 2017 07:17)  202 (08 Aug 2017 21:47)  148 (08 Aug 2017 16:14)  90 (08 Aug 2017 11:08)  93 (08 Aug 2017 07:19)  150 (07 Aug 2017 21:26)  201 (07 Aug 2017 16:31)    Anion Gap, Serum: 13 (08-10 @ 05:25)  Anion Gap, Serum: 12 (08-09 @ 06:11)      Calcium, Total Serum: 8.4 (08-10 @ 05:25)  Calcium, Total Serum: 8.7 (08-09 @ 06:11)          08-10    133<L>  |  96  |  19  ----------------------------<  193<H>  4.0   |  24  |  0.79    Ca    8.4      10 Aug 2017 05:25                          11.2   9.2   )-----------( 307      ( 10 Aug 2017 05:25 )             34.0     Medications  MEDICATIONS  (STANDING):  sodium chloride 0.9%. 1000 milliLiter(s) (10 mL/Hr) IV Continuous <Continuous>  aspirin enteric coated 81 milliGRAM(s) Oral daily  atorvastatin 80 milliGRAM(s) Oral at bedtime  dextrose 5%. 1000 milliLiter(s) (50 mL/Hr) IV Continuous <Continuous>  dextrose 50% Injectable 12.5 Gram(s) IV Push once  dextrose 50% Injectable 25 Gram(s) IV Push once  dextrose 50% Injectable 25 Gram(s) IV Push once  insulin lispro (HumaLOG) corrective regimen sliding scale   SubCutaneous three times a day before meals  insulin lispro (HumaLOG) corrective regimen sliding scale   SubCutaneous at bedtime  digoxin     Tablet 0.125 milliGRAM(s) Oral daily  enalapril 2.5 milliGRAM(s) Oral two times a day  metoprolol 75 milliGRAM(s) Oral every 12 hours  sodium chloride 0.9% lock flush 3 milliLiter(s) IV Push every 8 hours  insulin glargine Injectable (LANTUS) 15 Unit(s) SubCutaneous at bedtime  insulin lispro Injectable (HumaLOG) 6 Unit(s) SubCutaneous three times a day before meals  furosemide    Tablet 40 milliGRAM(s) Oral daily  spironolactone 25 milliGRAM(s) Oral two times a day  vancomycin  IVPB 1000 milliGRAM(s) IV Intermittent every 12 hours      Physical Exam  General: Patient comfortable in bed  Vital Signs Last 12 Hrs  T(F): 98.5 (08-10-17 @ 13:31), Max: 98.6 (08-10-17 @ 04:21)  HR: 75 (08-10-17 @ 13:31) (72 - 75)  BP: 108/56 (08-10-17 @ 13:31) (108/56 - 129/71)  BP(mean): --  RR: 18 (08-10-17 @ 13:31) (18 - 18)  SpO2: 97% (08-10-17 @ 13:31) (95% - 97%)  Neck: No palpable thyroid nodules.  CVS: S1S2, No murmurs  Respiratory: No wheezing, no crepitations  GI: Abdomen soft, bowel sounds positive  Musculoskeletal: Positive edema lower extremities bilaterally  Skin: No skin rashes, no ecchimosis    Diagnostics    Thyroid Stimulating Hormone, Serum: AM Sched. Collection: 29-Jul-2017 06:00 (07-28 @ 11:45)  Free Thyroxine, Serum: AM Sched. Collection: 29-Jul-2017 06:00 (07-28 @ 11:45)

## 2017-08-10 NOTE — PROGRESS NOTE ADULT - SUBJECTIVE AND OBJECTIVE BOX
HPI: no events over night , visit with patient conducted with  susan , Luis  [id number]  MEDICATIONS  (STANDING):  aspirin enteric coated 81 milliGRAM(s) Oral daily  atorvastatin 80 milliGRAM(s) Oral at bedtime  insulin lispro (HumaLOG) corrective regimen sliding scale   SubCutaneous three times a day before meals  insulin lispro (HumaLOG) corrective regimen sliding scale   SubCutaneous at bedtime  digoxin     Tablet 0.125 milliGRAM(s) Oral daily  enalapril 2.5 milliGRAM(s) Oral two times a day  metoprolol 75 milliGRAM(s) Oral every 12 hours  sodium chloride 0.9% lock flush 3 milliLiter(s) IV Push every 8 hours  insulin glargine Injectable (LANTUS) 15 Unit(s) SubCutaneous at bedtime  insulin lispro Injectable (HumaLOG) 6 Unit(s) SubCutaneous three times a day before meals  furosemide    Tablet 40 milliGRAM(s) Oral daily  spironolactone 25 milliGRAM(s) Oral two times a day  vancomycin  IVPB 1000 milliGRAM(s) IV Intermittent every 12 hours    MEDICATIONS  (PRN):  dextrose Gel 1 Dose(s) Oral once PRN Blood Glucose LESS THAN 70 milliGRAM(s)/deciLiter  glucagon  Injectable 1 milliGRAM(s) IntraMuscular once PRN Glucose <70 milliGRAM(s)/deciLiter  oxyCODONE    5 mG/acetaminophen 325 mG 1 Tablet(s) Oral every 4 hours PRN Moderate Pain (4 - 6)    Allergies NKDA    ROS:  Constitutional: Pt denies fever/chills  Neuro: denies dizziness, HA   CV: denies CP, palpitation  Pulm: denies SOB, cough, wheezing   GI: denies abd pain/N/V/D  : denies dysuria  Musculoskeletal: denies joint pain or swelling, denies restricted motion  Skin: denies ulcers, bleeding, rash    Vital Signs Last 24 Hrs  T(C): 36.9 (10 Aug 2017 13:31), Max: 38.3 (09 Aug 2017 23:30)  T(F): 98.5 (10 Aug 2017 13:31), Max: 100.9 (09 Aug 2017 23:30)  HR: 75 (10 Aug 2017 13:31) (72 - 80)  BP: 108/56 (10 Aug 2017 13:31) (103/66 - 130/79)  RR: 18 (10 Aug 2017 13:31) (18 - 18)  SpO2: 97% (10 Aug 2017 13:31) (95% - 98%)    Physical Exam:  Constitutional: well developed, well nourished,  and no acute distress  Neurological: Alert & Oriented x 3,  no focal deficits  HEENT:   Neck supple.  Respiratory: CTA B/L, No wheezing/crackles/rhonchi  Cardiovascular: (+) S1 & S2, RRR  Gastrointestinal: soft, NT, nondistended, (+) BS  Genitourinary: non distended bladder, voiding freely  Extremities: No pedal edema, No clubbing, No cyanosis  Skin: Left infraclavicular implant site no hematoma ecchymosis or bleeding     LABS:                        11.2   9.2   )-----------( 307      ( 10 Aug 2017 05:25 )             34.0     08-10    133<L>  |  96  |  19  ----------------------------<  193<H>  4.0   |  24  |  0.79    Ca    8.4      10 Aug 2017 05:25    TELE:  SR 1st AVB  EKG: ST 1st degree AVB 106bpm   chest xray

## 2017-08-10 NOTE — PROGRESS NOTE ADULT - PROBLEM SELECTOR PROBLEM 4
Anemia, unspecified type
Anemia, unspecified type
HLD (hyperlipidemia)
Prophylactic measure
Anemia, unspecified type
HLD (hyperlipidemia)

## 2017-08-10 NOTE — PROGRESS NOTE ADULT - SUBJECTIVE AND OBJECTIVE BOX
VITAL SIGNS    Telemetry:  ST 1AVB 60-90  Vital Signs Last 24 Hrs  T(C): 37 (08-10-17 @ 04:21), Max: 38.3 (17 @ 23:30)  T(F): 98.6 (08-10-17 @ 04:21), Max: 100.9 (17 @ 23:30)  HR: 72 (08-10-17 @ 04:21) (72 - 80)  BP: 129/71 (08-10-17 @ 04:21) (103/66 - 130/79)  RR: 18 (08-10-17 @ 04:21) (18 - 18)  SpO2: 95% (08-10-17 @ 04:21) (95% - 98%)             @ 07:01  -  08-10 @ 07:00  --------------------------------------------------------  IN: 360 mL / OUT: 500 mL / NET: -140 mL    08-10 @ 07:01  -  08-10 @ 12:59  --------------------------------------------------------  IN: 600 mL / OUT: 350 mL / NET: 250 mL         Daily Weight in k.4 (10 Aug 2017 08:13)  Admit Wt: Drug Dosing Weight  Height (cm): 167.64 (2017 11:42)  Weight (kg): 90.1 (2017 11:42)  BMI (kg/m2): 32.1 (2017 11:42)  BSA (m2): 1.99 (2017 11:42)      CAPILLARY BLOOD GLUCOSE  221 (10 Aug 2017 11:01)  164 (10 Aug 2017 07:28)  106 (09 Aug 2017 21:45)  133 (09 Aug 2017 16:39)              MEDICATIONS  sodium chloride 0.9%. 1000 milliLiter(s) IV Continuous <Continuous>  aspirin enteric coated 81 milliGRAM(s) Oral daily  atorvastatin 80 milliGRAM(s) Oral at bedtime  glucagon  Injectable 1 milliGRAM(s) IntraMuscular once PRN  insulin lispro (HumaLOG) corrective regimen sliding scale   SubCutaneous three times a day before meals  insulin lispro (HumaLOG) corrective regimen sliding scale   SubCutaneous at bedtime  digoxin     Tablet 0.125 milliGRAM(s) Oral daily  enalapril 2.5 milliGRAM(s) Oral two times a day  metoprolol 75 milliGRAM(s) Oral every 12 hours  sodium chloride 0.9% lock flush 3 milliLiter(s) IV Push every 8 hours  insulin glargine Injectable (LANTUS) 15 Unit(s) SubCutaneous at bedtime  insulin lispro Injectable (HumaLOG) 6 Unit(s) SubCutaneous three times a day before meals  furosemide    Tablet 40 milliGRAM(s) Oral daily  spironolactone 25 milliGRAM(s) Oral two times a day  vancomycin  IVPB 1000 milliGRAM(s) IV Intermittent every 12 hours  oxyCODONE    5 mG/acetaminophen 325 mG 1 Tablet(s) Oral every 4 hours PRN      PHYSICAL EXAM    Subjective:"HELLO"  Neurology: alert and oriented x 3, nonfocal, no gross deficits  CV : s1s1 reg no MRGS  Sternal Wound :  CDI , Stable, chest tube sites CDI, left anterior chest wall aicd site dsg cdi no hematoma   Lungs: CTA, equal chest expansion  Abdomen: soft, nontender, nondistended, positive bowel sounds, last bowel movement 8/10/17  :    voids  Extremities:   +1 BLLe, left EVG CDI edema,  +dp b/l , no calt tenderness b/l , warm and perfused b/l    LABS  08-10    133<L>  |  96  |  19  ----------------------------<  193<H>  4.0   |  24  |  0.79    Ca    8.4      10 Aug 2017 05:25                                   11.2   9.2   )-----------( 307      ( 10 Aug 2017 05:25 )             34.0                   PAST MEDICAL & SURGICAL HISTORY:  Anemia  DM (diabetes mellitus)  MI (myocardial infarction)  CAD (coronary artery disease)  CHF (congestive heart failure)  HLD (hyperlipidemia)  HTN (hypertension)  No significant past surgical history       Physical Therapy Rec:   Home  [  ]   Home w/ PT  [  ]  Rehab  [  ]

## 2017-08-10 NOTE — PROGRESS NOTE ADULT - PROBLEM SELECTOR PLAN 1
Will continue current insulin regimen for now. Will continue monitoring FS, log, will Follow up.  Patient does not have insurance can not afford insulin. May DC home on Glipizide 5mg daily am and Metformin 1000mg po BID, FU  Patient counseled for compliance with consistent low carb diet.

## 2017-08-10 NOTE — PROGRESS NOTE ADULT - PROBLEM SELECTOR PROBLEM 1
Coronary artery disease involving native coronary artery of native heart with unstable angina pectoris
Coronary artery disease involving native coronary artery of native heart with unstable angina pectoris
Type 2 diabetes mellitus with hyperglycemia, with long-term current use of insulin
Cardiac arrest with ventricular fibrillation
Coronary artery disease involving native coronary artery of native heart with unstable angina pectoris
Type 2 diabetes mellitus with hyperglycemia, with long-term current use of insulin
Type 2 diabetes mellitus with hyperglycemia, with long-term current use of insulin

## 2017-08-10 NOTE — PROGRESS NOTE ADULT - PROBLEM SELECTOR PLAN 1
C/w insulin coverage as per endocrine.  diabetic diet  continue lantus/ humalog  self administration of insulin

## 2017-08-10 NOTE — PROGRESS NOTE ADULT - PROBLEM SELECTOR PROBLEM 2
Coronary artery disease involving native coronary artery of native heart with unstable angina pectoris
Chronic systolic congestive heart failure
Coronary artery disease involving native coronary artery of native heart with unstable angina pectoris
Type 2 diabetes mellitus with hyperglycemia, with long-term current use of insulin
Type 2 diabetes mellitus with hyperglycemia, with long-term current use of insulin
Coronary artery disease involving native coronary artery of native heart with unstable angina pectoris
Coronary artery disease involving native coronary artery of native heart with unstable angina pectoris
Type 2 diabetes mellitus with hyperglycemia, with long-term current use of insulin

## 2017-08-10 NOTE — PROGRESS NOTE ADULT - PROBLEM SELECTOR PLAN 1
s/p ICD im[plant 8/9 for secondary prevention   visit with patient via  erika #[id number]  post device teaching done with patient in his language Tamel   ICD book card and discharge instructions given to the patient  follow up official read on chest xray R/O pneumothorax  dispo f/u in EP clinic on 8/30 @ 9am s/p ICD im[plant 8/9 for secondary prevention   visit with patient via  erika #[id number]  post device teaching done with patient in his language Tamel   ICD book card and discharge instructions given to the patient  follow up official read on chest xray R/O pneumothorax  abx complete after 6pm dose today   dispo f/u in EP clinic on 8/30 @ 9am

## 2017-08-10 NOTE — PROGRESS NOTE ADULT - PROBLEM SELECTOR PROBLEM 3
Acute systolic congestive heart failure
Acute systolic congestive heart failure
Anemia, unspecified type
Chronic systolic congestive heart failure
Chronic systolic congestive heart failure
HTN (hypertension)
Acute systolic congestive heart failure
Chronic systolic congestive heart failure
HTN (hypertension)

## 2017-08-10 NOTE — PROGRESS NOTE ADULT - SUBJECTIVE AND OBJECTIVE BOX
Cardiovascular Disease Progress Note    Overnight events: No acute events overnight. Mr. Lundberg feels well. He denies chest pain or SOB.   Otherwise review of systems negative    Objective Findings:  T(C): 37 (08-10-17 @ 04:21), Max: 38.3 (17 @ 23:30)  HR: 72 (08-10-17 @ 04:21) (70 - 80)  BP: 129/71 (08-10-17 @ 04:21) (102/63 - 130/79)  RR: 18 (08-10-17 @ 04:21) (18 - 18)  SpO2: 95% (08-10-17 @ 04:21) (95% - 98%)  Wt(kg): --  Daily     Daily Weight in k.4 (10 Aug 2017 08:13)      Physical Exam:  Gen: NAD  HEENT: EOMI  CV: RRR, normal S1 + S2, no m/r/g  Lungs: CTAB  Abd: soft, non-tender  Ext: No edema    Telemetry: Sinus; no ectopy    Laboratory Data:                        11.2   9.2   )-----------( 307      ( 10 Aug 2017 05:25 )             34.0     08-10    133<L>  |  96  |  19  ----------------------------<  193<H>  4.0   |  24  |  0.79    Ca    8.4      10 Aug 2017 05:25                Inpatient Medications:  MEDICATIONS  (STANDING):  sodium chloride 0.9%. 1000 milliLiter(s) (10 mL/Hr) IV Continuous <Continuous>  aspirin enteric coated 81 milliGRAM(s) Oral daily  atorvastatin 80 milliGRAM(s) Oral at bedtime  dextrose 5%. 1000 milliLiter(s) (50 mL/Hr) IV Continuous <Continuous>  dextrose 50% Injectable 12.5 Gram(s) IV Push once  dextrose 50% Injectable 25 Gram(s) IV Push once  dextrose 50% Injectable 25 Gram(s) IV Push once  insulin lispro (HumaLOG) corrective regimen sliding scale   SubCutaneous three times a day before meals  insulin lispro (HumaLOG) corrective regimen sliding scale   SubCutaneous at bedtime  digoxin     Tablet 0.125 milliGRAM(s) Oral daily  enalapril 2.5 milliGRAM(s) Oral two times a day  metoprolol 75 milliGRAM(s) Oral every 12 hours  sodium chloride 0.9% lock flush 3 milliLiter(s) IV Push every 8 hours  insulin glargine Injectable (LANTUS) 15 Unit(s) SubCutaneous at bedtime  insulin lispro Injectable (HumaLOG) 6 Unit(s) SubCutaneous three times a day before meals  furosemide    Tablet 40 milliGRAM(s) Oral daily  spironolactone 25 milliGRAM(s) Oral two times a day  vancomycin  IVPB 1000 milliGRAM(s) IV Intermittent every 12 hours      Assessment: 67 year old man with uncontrolled DM and multivessel CAD now s/p CABG    #Multivessel CAD- s/p 2V CABG with LIMA to LAD and SVG to OM on 2017.    Tolerating ASA, beta-blocker, enalapril and high intensity statin. Ambulating.      #Acute on chronic systolic CHF- due to ischemic cardiomyopathy. Approaching euvolemia.  Tolerating low dose enalapril, metoprolol, spironolactone, and furosemide.   S/P AICD  for positive EP study.     #Uncontrolled DM II- basal/bolus insulin. Endo input appreciated    D/C planning as per CT surgery team.    Mr. Lundberg has an outpatient appointment with me on 2017 at 3pm.  [address] [place] [address]  [phone]    Case discussed with son via telephone.      Over 35 minutes spent on total encounter; more than 50% of the visit was spent counseling and/or coordinating care by the attending physician.      Brayan eDvlin M.D.   Cardiovascular Disease  [phone]

## 2017-08-10 NOTE — PROGRESS NOTE ADULT - PROBLEM SELECTOR PLAN 5
C/w GI/DVT prophylaxis  C/w pain control  C/w glycemic control  C/w OOB, incentive spirometry, ambulation as tolerated.
C/w GI/DVT prophylaxis  C/w pain control  C/w glycemic control  C/w OOB, incentive spirometry, ambulation as tolerated.
EP following. EP study planned with possible plan for EPS study possible AICD  monday 8/7   continue b-blockers and dig at this time   isolate pw
EP following. Inducible VF during EPS aicd schedule for 8/9/17 with Dr AMELIA Wang  continue b-blockers and dig at this time   isolate pw
EP following. Inducible VF during EPS aicd schedule for 8/9/17 with Dr AMELIA Wang  continue b-blockers and dig at this time   isolate pw
now has aicd  continue b-blockers and dig at this time
C/w GI/DVT prophylaxis  C/w pain control  C/w glycemic control  C/w OOB, incentive spirometry, ambulation as tolerated.

## 2017-08-11 VITALS
TEMPERATURE: 98 F | DIASTOLIC BLOOD PRESSURE: 63 MMHG | HEART RATE: 71 BPM | OXYGEN SATURATION: 99 % | SYSTOLIC BLOOD PRESSURE: 103 MMHG | RESPIRATION RATE: 18 BRPM

## 2017-08-11 LAB
ANION GAP SERPL CALC-SCNC: 12 MMOL/L — SIGNIFICANT CHANGE UP (ref 5–17)
BUN SERPL-MCNC: 15 MG/DL — SIGNIFICANT CHANGE UP (ref 7–23)
CALCIUM SERPL-MCNC: 8.3 MG/DL — LOW (ref 8.4–10.5)
CHLORIDE SERPL-SCNC: 96 MMOL/L — SIGNIFICANT CHANGE UP (ref 96–108)
CO2 SERPL-SCNC: 26 MMOL/L — SIGNIFICANT CHANGE UP (ref 22–31)
CREAT SERPL-MCNC: 0.9 MG/DL — SIGNIFICANT CHANGE UP (ref 0.5–1.3)
GLUCOSE SERPL-MCNC: 143 MG/DL — HIGH (ref 70–99)
HCT VFR BLD CALC: 33.3 % — LOW (ref 39–50)
HGB BLD-MCNC: 11 G/DL — LOW (ref 13–17)
MCHC RBC-ENTMCNC: 29.6 PG — SIGNIFICANT CHANGE UP (ref 27–34)
MCHC RBC-ENTMCNC: 33.1 GM/DL — SIGNIFICANT CHANGE UP (ref 32–36)
MCV RBC AUTO: 89.3 FL — SIGNIFICANT CHANGE UP (ref 80–100)
PLATELET # BLD AUTO: 291 K/UL — SIGNIFICANT CHANGE UP (ref 150–400)
POTASSIUM SERPL-MCNC: 3.6 MMOL/L — SIGNIFICANT CHANGE UP (ref 3.5–5.3)
POTASSIUM SERPL-SCNC: 3.6 MMOL/L — SIGNIFICANT CHANGE UP (ref 3.5–5.3)
RBC # BLD: 3.73 M/UL — LOW (ref 4.2–5.8)
RBC # FLD: 16.5 % — HIGH (ref 10.3–14.5)
SODIUM SERPL-SCNC: 134 MMOL/L — LOW (ref 135–145)
WBC # BLD: 11.1 K/UL — HIGH (ref 3.8–10.5)
WBC # FLD AUTO: 11.1 K/UL — HIGH (ref 3.8–10.5)

## 2017-08-11 PROCEDURE — C8929: CPT

## 2017-08-11 PROCEDURE — P9037: CPT

## 2017-08-11 PROCEDURE — 85014 HEMATOCRIT: CPT

## 2017-08-11 PROCEDURE — 33249 INSJ/RPLCMT DEFIB W/LEAD(S): CPT | Mod: 58,Q0

## 2017-08-11 PROCEDURE — 84439 ASSAY OF FREE THYROXINE: CPT

## 2017-08-11 PROCEDURE — 85610 PROTHROMBIN TIME: CPT

## 2017-08-11 PROCEDURE — 87640 STAPH A DNA AMP PROBE: CPT

## 2017-08-11 PROCEDURE — 97116 GAIT TRAINING THERAPY: CPT

## 2017-08-11 PROCEDURE — 85027 COMPLETE CBC AUTOMATED: CPT

## 2017-08-11 PROCEDURE — 71045 X-RAY EXAM CHEST 1 VIEW: CPT

## 2017-08-11 PROCEDURE — C1751: CPT

## 2017-08-11 PROCEDURE — C1777: CPT

## 2017-08-11 PROCEDURE — 84295 ASSAY OF SERUM SODIUM: CPT

## 2017-08-11 PROCEDURE — 82553 CREATINE MB FRACTION: CPT

## 2017-08-11 PROCEDURE — 86850 RBC ANTIBODY SCREEN: CPT

## 2017-08-11 PROCEDURE — 81003 URINALYSIS AUTO W/O SCOPE: CPT

## 2017-08-11 PROCEDURE — 83605 ASSAY OF LACTIC ACID: CPT

## 2017-08-11 PROCEDURE — 94002 VENT MGMT INPAT INIT DAY: CPT

## 2017-08-11 PROCEDURE — 83880 ASSAY OF NATRIURETIC PEPTIDE: CPT

## 2017-08-11 PROCEDURE — P9011: CPT

## 2017-08-11 PROCEDURE — C1730: CPT

## 2017-08-11 PROCEDURE — 99238 HOSP IP/OBS DSCHRG MGMT 30/<: CPT

## 2017-08-11 PROCEDURE — C1898: CPT

## 2017-08-11 PROCEDURE — P9016: CPT

## 2017-08-11 PROCEDURE — 93880 EXTRACRANIAL BILAT STUDY: CPT

## 2017-08-11 PROCEDURE — 80162 ASSAY OF DIGOXIN TOTAL: CPT

## 2017-08-11 PROCEDURE — P9047: CPT

## 2017-08-11 PROCEDURE — 97162 PT EVAL MOD COMPLEX 30 MIN: CPT

## 2017-08-11 PROCEDURE — 85730 THROMBOPLASTIN TIME PARTIAL: CPT

## 2017-08-11 PROCEDURE — 80048 BASIC METABOLIC PNL TOTAL CA: CPT

## 2017-08-11 PROCEDURE — 82330 ASSAY OF CALCIUM: CPT

## 2017-08-11 PROCEDURE — 85576 BLOOD PLATELET AGGREGATION: CPT

## 2017-08-11 PROCEDURE — 82550 ASSAY OF CK (CPK): CPT

## 2017-08-11 PROCEDURE — P9045: CPT

## 2017-08-11 PROCEDURE — 82435 ASSAY OF BLOOD CHLORIDE: CPT

## 2017-08-11 PROCEDURE — 94003 VENT MGMT INPAT SUBQ DAY: CPT

## 2017-08-11 PROCEDURE — 86901 BLOOD TYPING SEROLOGIC RH(D): CPT

## 2017-08-11 PROCEDURE — 86891 AUTOLOGOUS BLOOD OP SALVAGE: CPT

## 2017-08-11 PROCEDURE — 86923 COMPATIBILITY TEST ELECTRIC: CPT

## 2017-08-11 PROCEDURE — 93620 COMP EP EVL R AT VEN PAC&REC: CPT

## 2017-08-11 PROCEDURE — C1889: CPT

## 2017-08-11 PROCEDURE — 82947 ASSAY GLUCOSE BLOOD QUANT: CPT

## 2017-08-11 PROCEDURE — 85384 FIBRINOGEN ACTIVITY: CPT

## 2017-08-11 PROCEDURE — 71046 X-RAY EXAM CHEST 2 VIEWS: CPT

## 2017-08-11 PROCEDURE — 82803 BLOOD GASES ANY COMBINATION: CPT

## 2017-08-11 PROCEDURE — 84443 ASSAY THYROID STIM HORMONE: CPT

## 2017-08-11 PROCEDURE — 84132 ASSAY OF SERUM POTASSIUM: CPT

## 2017-08-11 PROCEDURE — 82565 ASSAY OF CREATININE: CPT

## 2017-08-11 PROCEDURE — C1892: CPT

## 2017-08-11 PROCEDURE — 87641 MR-STAPH DNA AMP PROBE: CPT

## 2017-08-11 PROCEDURE — 86900 BLOOD TYPING SEROLOGIC ABO: CPT

## 2017-08-11 PROCEDURE — 84484 ASSAY OF TROPONIN QUANT: CPT

## 2017-08-11 PROCEDURE — C1769: CPT

## 2017-08-11 PROCEDURE — P9059: CPT

## 2017-08-11 PROCEDURE — 31720 CLEARANCE OF AIRWAYS: CPT

## 2017-08-11 PROCEDURE — 80053 COMPREHEN METABOLIC PANEL: CPT

## 2017-08-11 PROCEDURE — C1721: CPT

## 2017-08-11 PROCEDURE — 83735 ASSAY OF MAGNESIUM: CPT

## 2017-08-11 PROCEDURE — 93005 ELECTROCARDIOGRAM TRACING: CPT

## 2017-08-11 PROCEDURE — C1894: CPT

## 2017-08-11 PROCEDURE — 94010 BREATHING CAPACITY TEST: CPT

## 2017-08-11 PROCEDURE — 84100 ASSAY OF PHOSPHORUS: CPT

## 2017-08-11 PROCEDURE — 36430 TRANSFUSION BLD/BLD COMPNT: CPT

## 2017-08-11 RX ORDER — POTASSIUM CHLORIDE 20 MEQ
1 PACKET (EA) ORAL
Qty: 14 | Refills: 0 | OUTPATIENT
Start: 2017-08-11 | End: 2017-08-25

## 2017-08-11 RX ORDER — OXYCODONE HYDROCHLORIDE 5 MG/1
1 TABLET ORAL
Qty: 56 | Refills: 0 | OUTPATIENT
Start: 2017-08-11 | End: 2017-08-25

## 2017-08-11 RX ORDER — FERROUS SULFATE 325(65) MG
1 TABLET ORAL
Qty: 0 | Refills: 0 | COMMUNITY

## 2017-08-11 RX ORDER — DIGOXIN 250 MCG
1 TABLET ORAL
Qty: 30 | Refills: 0 | OUTPATIENT
Start: 2017-08-11 | End: 2017-09-10

## 2017-08-11 RX ORDER — METFORMIN HYDROCHLORIDE 850 MG/1
1 TABLET ORAL
Qty: 60 | Refills: 0 | OUTPATIENT
Start: 2017-08-11 | End: 2017-09-10

## 2017-08-11 RX ORDER — VALSARTAN 80 MG/1
1 TABLET ORAL
Qty: 0 | Refills: 0 | COMMUNITY

## 2017-08-11 RX ORDER — INSULIN GLARGINE 100 [IU]/ML
17 INJECTION, SOLUTION SUBCUTANEOUS
Qty: 0 | Refills: 0 | COMMUNITY

## 2017-08-11 RX ORDER — CLOPIDOGREL BISULFATE 75 MG/1
1 TABLET, FILM COATED ORAL
Qty: 0 | Refills: 0 | COMMUNITY

## 2017-08-11 RX ORDER — ATORVASTATIN CALCIUM 80 MG/1
1 TABLET, FILM COATED ORAL
Qty: 0 | Refills: 0 | COMMUNITY

## 2017-08-11 RX ORDER — FERROUS SULFATE 325(65) MG
1 TABLET ORAL
Qty: 60 | Refills: 0 | OUTPATIENT
Start: 2017-08-11

## 2017-08-11 RX ORDER — SPIRONOLACTONE 25 MG/1
1 TABLET, FILM COATED ORAL
Qty: 14 | Refills: 0 | OUTPATIENT
Start: 2017-08-11 | End: 2017-08-25

## 2017-08-11 RX ORDER — FUROSEMIDE 40 MG
1 TABLET ORAL
Qty: 0 | Refills: 0 | COMMUNITY

## 2017-08-11 RX ORDER — METOPROLOL TARTRATE 50 MG
1 TABLET ORAL
Qty: 0 | Refills: 0 | COMMUNITY

## 2017-08-11 RX ORDER — FUROSEMIDE 40 MG
1 TABLET ORAL
Qty: 14 | Refills: 0 | OUTPATIENT
Start: 2017-08-11 | End: 2017-08-25

## 2017-08-11 RX ORDER — POTASSIUM CHLORIDE 20 MEQ
20 PACKET (EA) ORAL
Qty: 0 | Refills: 0 | Status: COMPLETED | OUTPATIENT
Start: 2017-08-11 | End: 2017-08-11

## 2017-08-11 RX ORDER — VALSARTAN 80 MG/1
1 TABLET ORAL
Qty: 30 | Refills: 0 | OUTPATIENT
Start: 2017-08-11 | End: 2017-09-10

## 2017-08-11 RX ORDER — ATORVASTATIN CALCIUM 80 MG/1
1 TABLET, FILM COATED ORAL
Qty: 30 | Refills: 0 | OUTPATIENT
Start: 2017-08-11 | End: 2017-09-10

## 2017-08-11 RX ADMIN — Medication 81 MILLIGRAM(S): at 11:37

## 2017-08-11 RX ADMIN — Medication 6 UNIT(S): at 11:37

## 2017-08-11 RX ADMIN — Medication 0.12 MILLIGRAM(S): at 05:46

## 2017-08-11 RX ADMIN — Medication 2: at 11:37

## 2017-08-11 RX ADMIN — Medication 75 MILLIGRAM(S): at 05:46

## 2017-08-11 RX ADMIN — Medication 20 MILLIEQUIVALENT(S): at 11:46

## 2017-08-11 RX ADMIN — Medication 20 MILLIEQUIVALENT(S): at 10:50

## 2017-08-11 RX ADMIN — Medication 2.5 MILLIGRAM(S): at 05:45

## 2017-08-11 RX ADMIN — SODIUM CHLORIDE 3 MILLILITER(S): 9 INJECTION INTRAMUSCULAR; INTRAVENOUS; SUBCUTANEOUS at 13:05

## 2017-08-11 RX ADMIN — SODIUM CHLORIDE 3 MILLILITER(S): 9 INJECTION INTRAMUSCULAR; INTRAVENOUS; SUBCUTANEOUS at 05:47

## 2017-08-11 RX ADMIN — SPIRONOLACTONE 25 MILLIGRAM(S): 25 TABLET, FILM COATED ORAL at 05:46

## 2017-08-11 RX ADMIN — Medication 1: at 07:35

## 2017-08-11 RX ADMIN — Medication 40 MILLIGRAM(S): at 05:46

## 2017-08-11 RX ADMIN — Medication 6 UNIT(S): at 07:35

## 2017-08-11 NOTE — PROGRESS NOTE ADULT - SUBJECTIVE AND OBJECTIVE BOX
Cardiovascular Disease Progress Note    Overnight events: No acute events overnight. Mr. Lundberg denies chest pain or SOB.    Otherwise review of systems negative    Objective Findings:  T(C): 36.6 (17 @ 05:08), Max: 36.9 (08-10-17 @ 13:31)  HR: 81 (17 @ 05:08) (75 - 86)  BP: 129/68 (17 @ 05:08) (108/56 - 134/74)  RR: 18 (17 @ 05:08) (18 - 18)  SpO2: 96% (17 @ 05:08) (96% - 97%)  Wt(kg): --  Daily     Daily Weight in k.9 (11 Aug 2017 08:09)      Physical Exam:  Gen: NAD  HEENT: EOMI  CV: RRR, normal S1 + S2, no m/r/g  Lungs: CTAB  Abd: soft, non-tender  Ext: No edema    Telemetry: Sinus; 7 beats WCT over night.     Laboratory Data:                        11.0   11.1  )-----------( 291      ( 11 Aug 2017 05:07 )             33.3     08-    134<L>  |  96  |  15  ----------------------------<  143<H>  3.6   |  26  |  0.90    Ca    8.3<L>      11 Aug 2017 05:07                Inpatient Medications:  MEDICATIONS  (STANDING):  sodium chloride 0.9%. 1000 milliLiter(s) (10 mL/Hr) IV Continuous <Continuous>  aspirin enteric coated 81 milliGRAM(s) Oral daily  atorvastatin 80 milliGRAM(s) Oral at bedtime  dextrose 5%. 1000 milliLiter(s) (50 mL/Hr) IV Continuous <Continuous>  dextrose 50% Injectable 12.5 Gram(s) IV Push once  dextrose 50% Injectable 25 Gram(s) IV Push once  dextrose 50% Injectable 25 Gram(s) IV Push once  insulin lispro (HumaLOG) corrective regimen sliding scale   SubCutaneous three times a day before meals  insulin lispro (HumaLOG) corrective regimen sliding scale   SubCutaneous at bedtime  digoxin     Tablet 0.125 milliGRAM(s) Oral daily  enalapril 2.5 milliGRAM(s) Oral two times a day  metoprolol 75 milliGRAM(s) Oral every 12 hours  sodium chloride 0.9% lock flush 3 milliLiter(s) IV Push every 8 hours  insulin glargine Injectable (LANTUS) 15 Unit(s) SubCutaneous at bedtime  insulin lispro Injectable (HumaLOG) 6 Unit(s) SubCutaneous three times a day before meals  furosemide    Tablet 40 milliGRAM(s) Oral daily  spironolactone 25 milliGRAM(s) Oral two times a day      Assessment:67 year old man with uncontrolled DM and multivessel CAD now s/p CABG    #Multivessel CAD- s/p 2V CABG with LIMA to LAD and SVG to OM on 2017.    Tolerating ASA, beta-blocker, enalapril and high intensity statin. Ambulating.      #Acute on chronic systolic CHF- due to ischemic cardiomyopathy. Approaching euvolemia.  Tolerating low dose enalapril, metoprolol, spironolactone, and furosemide.   S/P AICD  for positive EP study.     #Uncontrolled DM II- basal/bolus insulin. Endo input appreciated    D/C planning today, as per CT surgery team.    Mr. Lundberg has an outpatient appointment with me on 2017 at 3pm.  [id number] [place] [address]  [phone]        Plan of Care:    Over 35 minutes spent on total encounter; more than 50% of the visit was spent counseling and/or coordinating care by the attending physician.      Brayan Devlin M.D.   Cardiovascular Disease  [phone]

## 2017-08-12 RX ORDER — METOPROLOL TARTRATE 50 MG
1 TABLET ORAL
Qty: 30 | Refills: 0 | OUTPATIENT
Start: 2017-08-12 | End: 2017-09-11

## 2017-08-15 ENCOUNTER — OTHER (OUTPATIENT)
Age: 67
End: 2017-08-15

## 2017-08-15 DIAGNOSIS — Z86.79 PERSONAL HISTORY OF OTHER DISEASES OF THE CIRCULATORY SYSTEM: ICD-10-CM

## 2017-08-15 DIAGNOSIS — D64.9 ANEMIA, UNSPECIFIED: ICD-10-CM

## 2017-08-15 DIAGNOSIS — I25.10 ATHEROSCLEROTIC HEART DISEASE OF NATIVE CORONARY ARTERY W/OUT ANGINA PECTORIS: ICD-10-CM

## 2017-08-15 DIAGNOSIS — Z95.1 PRESENCE OF AORTOCORONARY BYPASS GRAFT: ICD-10-CM

## 2017-08-15 DIAGNOSIS — I25.2 OLD MYOCARDIAL INFARCTION: ICD-10-CM

## 2017-08-15 DIAGNOSIS — E11.9 TYPE 2 DIABETES MELLITUS W/OUT COMPLICATIONS: ICD-10-CM

## 2017-08-15 DIAGNOSIS — I10 ESSENTIAL (PRIMARY) HYPERTENSION: ICD-10-CM

## 2017-08-15 RX ORDER — VALSARTAN 40 MG/1
40 TABLET, COATED ORAL DAILY
Refills: 0 | Status: ACTIVE | COMMUNITY

## 2017-08-15 RX ORDER — DIGOXIN 125 UG/1
125 TABLET ORAL DAILY
Qty: 14 | Refills: 5 | Status: ACTIVE | COMMUNITY

## 2017-08-15 RX ORDER — FERROUS SULFATE TAB EC 325 MG (65 MG FE EQUIVALENT) 325 (65 FE) MG
325 (65 FE) TABLET DELAYED RESPONSE ORAL TWICE DAILY
Refills: 0 | Status: ACTIVE | COMMUNITY

## 2017-08-15 RX ORDER — ASPIRIN ENTERIC COATED TABLETS 81 MG 81 MG/1
81 TABLET, DELAYED RELEASE ORAL DAILY
Refills: 0 | Status: ACTIVE | COMMUNITY

## 2017-08-15 RX ORDER — POTASSIUM CHLORIDE 750 MG/1
10 TABLET, EXTENDED RELEASE ORAL DAILY
Refills: 0 | Status: ACTIVE | COMMUNITY

## 2017-08-15 RX ORDER — GLIPIZIDE 5 MG/1
5 TABLET ORAL DAILY
Refills: 0 | Status: ACTIVE | COMMUNITY

## 2017-08-15 RX ORDER — ATORVASTATIN CALCIUM 80 MG/1
80 TABLET, FILM COATED ORAL
Qty: 90 | Refills: 3 | Status: ACTIVE | COMMUNITY

## 2017-08-15 RX ORDER — METFORMIN HYDROCHLORIDE 1000 MG/1
1000 TABLET, COATED ORAL TWICE DAILY
Refills: 0 | Status: ACTIVE | COMMUNITY

## 2017-08-15 RX ORDER — OXYCODONE HYDROCHLORIDE AND ACETAMINOPHEN 5; 325 MG/1; MG/1
5-325 TABLET ORAL
Qty: 30 | Refills: 0 | Status: ACTIVE | COMMUNITY

## 2017-08-15 RX ORDER — SPIRONOLACTONE 25 MG/1
25 TABLET ORAL DAILY
Qty: 30 | Refills: 0 | Status: ACTIVE | COMMUNITY

## 2017-08-16 ENCOUNTER — APPOINTMENT (OUTPATIENT)
Dept: INTERNAL MEDICINE | Facility: CLINIC | Age: 67
End: 2017-08-16

## 2017-08-23 ENCOUNTER — APPOINTMENT (OUTPATIENT)
Dept: CARDIOTHORACIC SURGERY | Facility: CLINIC | Age: 67
End: 2017-08-23

## 2017-08-23 VITALS
RESPIRATION RATE: 14 BRPM | SYSTOLIC BLOOD PRESSURE: 109 MMHG | OXYGEN SATURATION: 98 % | HEIGHT: 69 IN | DIASTOLIC BLOOD PRESSURE: 68 MMHG | BODY MASS INDEX: 25.18 KG/M2 | HEART RATE: 94 BPM | WEIGHT: 170 LBS

## 2017-08-23 DIAGNOSIS — Z09 ENCOUNTER FOR FOLLOW-UP EXAMINATION AFTER COMPLETED TREATMENT FOR CONDITIONS OTHER THAN MALIGNANT NEOPLASM: ICD-10-CM

## 2017-08-23 RX ORDER — FUROSEMIDE 40 MG/1
40 TABLET ORAL DAILY
Qty: 7 | Refills: 0 | Status: ACTIVE | COMMUNITY

## 2017-08-23 RX ORDER — METOPROLOL SUCCINATE 50 MG/1
50 TABLET, EXTENDED RELEASE ORAL DAILY
Refills: 0 | Status: ACTIVE | COMMUNITY

## 2017-08-30 ENCOUNTER — NON-APPOINTMENT (OUTPATIENT)
Age: 67
End: 2017-08-30

## 2017-08-30 ENCOUNTER — APPOINTMENT (OUTPATIENT)
Dept: ELECTROPHYSIOLOGY | Facility: CLINIC | Age: 67
End: 2017-08-30
Payer: COMMERCIAL

## 2017-08-30 VITALS — DIASTOLIC BLOOD PRESSURE: 64 MMHG | HEART RATE: 91 BPM | OXYGEN SATURATION: 99 % | SYSTOLIC BLOOD PRESSURE: 98 MMHG

## 2017-08-30 DIAGNOSIS — I25.5 ISCHEMIC CARDIOMYOPATHY: ICD-10-CM

## 2017-08-30 PROCEDURE — 93283 PRGRMG EVAL IMPLANTABLE DFB: CPT

## 2017-11-30 ENCOUNTER — NON-APPOINTMENT (OUTPATIENT)
Age: 67
End: 2017-11-30

## 2017-11-30 ENCOUNTER — APPOINTMENT (OUTPATIENT)
Dept: ELECTROPHYSIOLOGY | Facility: CLINIC | Age: 67
End: 2017-11-30
Payer: COMMERCIAL

## 2017-11-30 VITALS
HEIGHT: 69 IN | OXYGEN SATURATION: 99 % | DIASTOLIC BLOOD PRESSURE: 60 MMHG | SYSTOLIC BLOOD PRESSURE: 103 MMHG | WEIGHT: 170 LBS | BODY MASS INDEX: 25.18 KG/M2 | HEART RATE: 75 BPM

## 2017-11-30 PROCEDURE — 93283 PRGRMG EVAL IMPLANTABLE DFB: CPT

## 2018-03-01 ENCOUNTER — APPOINTMENT (OUTPATIENT)
Dept: ELECTROPHYSIOLOGY | Facility: CLINIC | Age: 68
End: 2018-03-01

## 2018-05-03 NOTE — DISCHARGE NOTE ADULT - NS MD DC FALL RISK RISK
For information on Fall & Injury Prevention, visit [email or web address]

## 2018-06-07 ENCOUNTER — APPOINTMENT (OUTPATIENT)
Dept: ELECTROPHYSIOLOGY | Facility: CLINIC | Age: 68
End: 2018-06-07

## 2018-07-30 PROBLEM — E11.9 TYPE 2 DIABETES MELLITUS WITHOUT COMPLICATIONS: Chronic | Status: ACTIVE | Noted: 2017-07-27

## 2018-07-30 PROBLEM — I10 ESSENTIAL (PRIMARY) HYPERTENSION: Chronic | Status: ACTIVE | Noted: 2017-07-27

## 2018-07-30 PROBLEM — I50.9 HEART FAILURE, UNSPECIFIED: Chronic | Status: ACTIVE | Noted: 2017-07-27

## 2018-07-30 PROBLEM — D64.9 ANEMIA, UNSPECIFIED: Chronic | Status: ACTIVE | Noted: 2017-07-27

## 2018-07-30 PROBLEM — I21.3 ST ELEVATION (STEMI) MYOCARDIAL INFARCTION OF UNSPECIFIED SITE: Chronic | Status: ACTIVE | Noted: 2017-07-27

## 2018-07-30 PROBLEM — I25.10 ATHEROSCLEROTIC HEART DISEASE OF NATIVE CORONARY ARTERY WITHOUT ANGINA PECTORIS: Chronic | Status: ACTIVE | Noted: 2017-07-27

## 2018-07-30 PROBLEM — E78.5 HYPERLIPIDEMIA, UNSPECIFIED: Chronic | Status: ACTIVE | Noted: 2017-07-27

## 2018-10-29 NOTE — PROGRESS NOTE ADULT - PROBLEM/PLAN-4
DISPLAY PLAN FREE TEXT
round/brisk

## 2018-12-13 ENCOUNTER — APPOINTMENT (OUTPATIENT)
Dept: ELECTROPHYSIOLOGY | Facility: CLINIC | Age: 68
End: 2018-12-13

## 2019-12-05 NOTE — PATIENT PROFILE ADULT. - ASSIST WITH
Pt discharge to home.  Pt provided with discharge instructions.  Pt verbalized understanding, all questions answered.  VSS upon DC. Pt steady on feet upon DC.       standing/walking/toileting

## 2020-12-29 NOTE — CHART NOTE - NSCHARTNOTEFT_GEN_A_CORE
Pt requesting Jamie sent in as well. Reason for CCU Consult:  s/p [facility]    HISTORY OF PRESENT ILLNESS:  Patient is a 67y old  Male who presents with a chief complaint of      Allergies    No Known Allergies    Intolerances        PAST MEDICAL & SURGICAL HISTORY:  Anemia  DM (diabetes mellitus)  MI (myocardial infarction)  CAD (coronary artery disease)  CHF (congestive heart failure)  HLD (hyperlipidemia)  HTN (hypertension)  No significant past surgical history      MEDICATIONS  (STANDING):  sodium chloride 0.9% lock flush 3 milliLiter(s) IV Push every 8 hours    MEDICATIONS  (PRN):      Drug Dosing Weight  Height (cm): 175.3 (27 Jul 2017 15:47)  Weight (kg): 91.626 (27 Jul 2017 15:47)  BMI (kg/m2): 29.8 (27 Jul 2017 15:47)  BSA (m2): 2.07 (27 Jul 2017 15:47)    FAMILY HISTORY:  No pertinent family history in first degree relatives      SOCIAL HISTORY:  Smoker[ ]  Non smoker[ ]  Alcohol [ ]      ADVANCE DIRECTIVES:    REVIEW OF SYSTEMS:    CONSTITUTIONAL: No fevers, No chills, No fatigue, No weight gain  EYES: No vision changes   ENT: No congestion, No ear pain, No sore throat.  NECK: No pain, No stiffness  RESPIRATORY: No shortness of breath, No cough, No wheezing, No hemoptysis  CARDIOVASCULAR: No chest pain. No palpitations, No GOLDSMITH, No orthopnea, No paroxysmal nocturnal dyspnea, No pleuritic pain  GASTROINTESTINAL: No abdominal pain, No nausea, No vomiting, No hematemesis, No diarrhea No constipation. No melena  GENITOURINARY: No dysuria, No frequency, No incontinence, No hematuria  NEUROLOGICAL: No dizziness, No lightheadedness, No syncope, No LOC, No headache, No numbness or weakness  MUSCULOSKELETAL: No joint pain, No joint swelling.  PSYCHIATRIC: No anxiety, No depression  SKIN: No diaphoresis. No itching, No rashes, No pressure ulcers    All other review of systems is negative unless indicated above.    PHYSICAL EXAM:    Appearance: NAD, no distress  HEENT:   Normal oral mucosa, PERRL, EOMI  Cardiovascular: Regular rate and rhythm, Normal S1 S2, No JVD, No murmurs, No edema  Respiratory: Lungs clear to auscultation. No rales, No rhonchi, No wheezing. No tenderness to palpation  Gastrointestinal:  Soft, Non-tender, + BS  Neurologic: Non-focal, A&Ox3  Skin: Warm and dry, No rashes, No ecchymoses, No cyanosis  Extremities: No clubbing, cyanosis or edema  Vascular: Peripheral pulses palpable 2+ bilaterally  Psychiatry: Mood & affect appropriate      	    		            ICU Vital Signs Last 24 Hrs  T(C): --  T(F): --  HR: --  BP: --  BP(mean): --  ABP: --  ABP(mean): --  RR: --  SpO2: --          LABS:  CBC Full  -  ( 27 Jul 2017 19:00 )  WBC Count : 7.38 K/uL  Hemoglobin : 9.2 g/dL  Hematocrit : 31.8 %  Platelet Count - Automated : 246 K/uL  Mean Cell Volume : 79.5 fL  Mean Cell Hemoglobin : 23.0 pg  Mean Cell Hemoglobin Concentration : 28.9 %  Auto Neutrophil # : x  Auto Lymphocyte # : x  Auto Monocyte # : x  Auto Eosinophil # : x  Auto Basophil # : x  Auto Neutrophil % : x  Auto Lymphocyte % : x  Auto Monocyte % : x  Auto Eosinophil % : x  Auto Basophil % : x              CAPILLARY BLOOD GLUCOSE              I&O's Detail      EKG:    ECHO      RADIOLOGY STUDIES    CXR:     CT SCAN:     ULTRASOUND:     ASSESSMENT      PLAN          Case discussed with Cardiology fellow Reason for CCU Consult:  s/p [facility], needs CCU for close monitoring    HISTORY OF PRESENT ILLNESS:  66 y/o M w/ PMH of DM, HTN, CAD, Anemia, MI (?2007) and CHF being transferred from [facility] for cardiac catheretization. Pt was admitted to [place] for an acute CHF exacerbation and was diuresed with IV Lasix BID. During hospitalization in [facility], he had a stress test on 7/24/17 which showed a severe fixed defect involving the mid to apical gayatri-septal wall and EF of 34%. His admission was complicated by Vfib cardiac arrest and ROSC was achieved after 12 minutes after 3 doses of epinephrine and defibrillation. Patient was then transferred to CCU at that time after ROSC and intubation. Pt was extubated in CCU and echo showed EF 35-40%, severe hypokinesis of anterior/septal/apical walls with severe diastolic dysfunction. Patient was transfused 1 unit PRBC for anemia and was started on iron supplementation. Pt transferred to [facility] cath lab for angiogram 7/27/17. Patient now transferred to [facility] CCU for close monitoring.      Allergies    No Known Allergies    Intolerances        PAST MEDICAL & SURGICAL HISTORY:  Anemia  DM (diabetes mellitus)  MI (myocardial infarction)  CAD (coronary artery disease)  CHF (congestive heart failure)  HLD (hyperlipidemia)  HTN (hypertension)  No significant past surgical history      MEDICATIONS :  · 	aspirin 81 mg oral tablet: Last Dose Taken:  , 1 tab(s) orally once a day  · 	Lipitor 80 mg oral tablet: Last Dose Taken:  , 1 tab(s) orally once a day  · 	Plavix 75 mg oral tablet: Last Dose Taken:  , 1 tab(s) orally once a day  · 	Lasix 40 mg oral tablet: Last Dose Taken:  , 1 tab(s) orally once a day  · 	Lantus 100 units/mL subcutaneous solution: Last Dose Taken:  , 17 unit(s) subcutaneous once a day (at bedtime)  · 	ferrous sulfate 325 mg (65 mg elemental iron) oral delayed release tablet: Last Dose Taken:  , 1 tab(s) orally 2 times a day  · 	metoprolol tartrate 25 mg oral tablet: Last Dose Taken:  , 1 tab(s) orally 2 times a day  · 	valsartan 40 mg oral tablet: Last Dose Taken:  , 1 tab(s) orally once a day      Drug Dosing Weight  Height (cm): 175.3 (27 Jul 2017 15:47)  Weight (kg): 91.626 (27 Jul 2017 15:47)  BMI (kg/m2): 29.8 (27 Jul 2017 15:47)  BSA (m2): 2.07 (27 Jul 2017 15:47)    FAMILY HISTORY:  No pertinent family history in first degree relatives      SOCIAL HISTORY:  Smoker[ ]  Non smoker[x ]  Alcohol- denies      ADVANCE DIRECTIVES: full code    REVIEW OF SYSTEMS:    CONSTITUTIONAL: No fevers, No chills, No fatigue, No weight gain  EYES: No vision changes   ENT: No congestion, No ear pain, No sore throat.  NECK: No pain, No stiffness  RESPIRATORY: No shortness of breath, No cough, No wheezing, No hemoptysis  CARDIOVASCULAR: No chest pain. No palpitations, No GOLDSMITH, No orthopnea, No paroxysmal nocturnal dyspnea, No pleuritic pain  GASTROINTESTINAL: No abdominal pain, No nausea, No vomiting, No hematemesis, No diarrhea No constipation. No melena  GENITOURINARY: No dysuria, No frequency, No incontinence, No hematuria  NEUROLOGICAL: No dizziness, No lightheadedness, No syncope, No LOC, No headache, No numbness or weakness  MUSCULOSKELETAL: No joint pain, No joint swelling.  PSYCHIATRIC: No anxiety, No depression  SKIN: No diaphoresis. No itching, No rashes, No pressure ulcers    All other review of systems is negative unless indicated above.    PHYSICAL EXAM:    Appearance: NAD, no distress  HEENT:   Normal oral mucosa, PERRL, EOMI  Cardiovascular: Regular rate and rhythm, Normal S1 S2, No JVD, No murmurs, No edema  Respiratory: Lungs clear to auscultation. No rales, No rhonchi, No wheezing. No tenderness to palpation  Gastrointestinal:  Soft, Non-tender, + BS  Neurologic: Non-focal, A&Ox3  Skin: Warm and dry, No rashes, No ecchymoses, No cyanosis  Extremities: No clubbing, cyanosis or edema  Vascular: Peripheral pulses palpable 2+ bilaterally  Psychiatry: Mood & affect appropriate      ICU Vital Signs Last 24 Hrs  T(C): --  T(F): --  HR: --  BP: --  BP(mean): --  ABP: --  ABP(mean): --  RR: --  SpO2: --        LABS:  CBC Full  -  ( 27 Jul 2017 19:00 )  WBC Count : 7.38 K/uL  Hemoglobin : 9.2 g/dL  Hematocrit : 31.8 %  Platelet Count - Automated : 246 K/uL  Mean Cell Volume : 79.5 fL  Mean Cell Hemoglobin : 23.0 pg  Mean Cell Hemoglobin Concentration : 28.9 %  Auto Neutrophil # : x  Auto Lymphocyte # : x  Auto Monocyte # : x  Auto Eosinophil # : x  Auto Basophil # : x  Auto Neutrophil % : x  Auto Lymphocyte % : x  Auto Monocyte % : x  Auto Eosinophil % : x  Auto Basophil % : x        EKG:    ECHO:pending    CARDIAC CATH:  < from: Cardiac Cath Lab - Adult (07.27.17 @ 18:19) >    VENTRICLES: No LV gram was performed; however, a recent echocardiogram  demonstrated an EF of 35 %.  CORONARY VESSELS: The coronary circulation is left dominant.  LM:   --  LM: Angiography showed mild atherosclerosis with no flow limiting  lesions.  LAD:   --  Proximal LAD: There was a 95 % stenosis.  --  Mid LAD: There was a 99 % stenosis.  --  D1: Angiography showed severe atherosclerosis.  CX:   --  Circumflex: Angiography showed mild atherosclerosis with no flow  limiting lesions.  --  OM1: The vessel was large sized. There was a 99 % stenosis.  RCA:   --  Proximal RCA: There was a 100 % stenosis.  COMPLICATIONS: There were no complications.  DIAGNOSTIC RECOMMENDATIONS: Severe multi-vessel CAD in DM patient.  s/p V. Fib Arrest (Defib x 1)  Systolic CHF EF 35%  CTS for CABG.  INTERVENTIONAL RECOMMENDATIONS: Severe multi-vessel CAD in DM patient.  s/p V. Fib Arrest (Defib x 1)  Systolic CHF EF 35%  CTS for CABG.    < end of copied text >      RADIOLOGY STUDIES    CXR: pending      ASSESSMENT  66 y/o M w/ PMH of DM, HTN, severe multi vessel CAD, HFrEF (EF: 35%) a/w CAD s/p LHC    PLAN    Has h/o CAD- s/p LHC showing severe multi vessel CAD; pLAd 95% stenosis, mLAD 99% stenosis, OM1 99% stenosis, % stenosis. EF 35%  -C/W aspirin, plavix and lipitor  -Check lipid panel  -low fat, DASH diet  -Risk factor modification  - CTS consult for CABG, transfer to [facility]    Patient with h/o hypertension and is on   Continue home medications  DASH diet  Monitor blood pressure    Has h/o diabetes and is on oral hypoglycemic agents and insulin  Check HbA1c  Hold oral hypoglycemic agents  Monitor blood sugars ac and hs  Lispro insulin sliding scale  Lantus   Low carbohydrate diet  Diabetes education  Endocrine consult if needed  Nutrition consult if needed        Case discussed with Cardiology fellow Dr. Schofield Reason for CCU Consult:  s/p [facility], needs CCU for close monitoring    HISTORY OF PRESENT ILLNESS:  68 y/o M w/ PMH of DM on insulin, HTN, CAD, Anemia, MI (?2007) and CHF being transferred from [facility] for cardiac catheretization. Pt was admitted to [place] for an acute CHF exacerbation and was diuresed with IV Lasix BID. During hospitalization in [facility], he had a stress test on 7/24/17 which showed a severe fixed defect involving the mid to apical gayatri-septal wall and EF of 34%. His admission was complicated by Vfib cardiac arrest and ROSC was achieved after 12 minutes after 3 doses of epinephrine and defibrillation. Patient was then transferred to CCU at that time after ROSC and intubation. Pt was extubated in CCU and echo showed EF 35-40%, severe hypokinesis of anterior/septal/apical walls with severe diastolic dysfunction. Patient was transfused 1 unit PRBC for anemia and was started on iron supplementation. Pt transferred to [facility] cath lab for angiogram 7/27/17. Patient now transferred to [facility] CCU for close monitoring.      Allergies    No Known Allergies    Intolerances        PAST MEDICAL & SURGICAL HISTORY:  Anemia  DM (diabetes mellitus)  MI (myocardial infarction)  CAD (coronary artery disease)  CHF (congestive heart failure)  HLD (hyperlipidemia)  HTN (hypertension)  No significant past surgical history      MEDICATIONS :  · 	aspirin 81 mg oral tablet: Last Dose Taken:  , 1 tab(s) orally once a day  · 	Lipitor 80 mg oral tablet: Last Dose Taken:  , 1 tab(s) orally once a day  · 	Plavix 75 mg oral tablet: Last Dose Taken:  , 1 tab(s) orally once a day  · 	Lasix 40 mg oral tablet: Last Dose Taken:  , 1 tab(s) orally once a day  · 	Lantus 100 units/mL subcutaneous solution: Last Dose Taken:  , 17 unit(s) subcutaneous once a day (at bedtime)  · 	ferrous sulfate 325 mg (65 mg elemental iron) oral delayed release tablet: Last Dose Taken:  , 1 tab(s) orally 2 times a day  · 	metoprolol tartrate 25 mg oral tablet: Last Dose Taken:  , 1 tab(s) orally 2 times a day  · 	valsartan 40 mg oral tablet: Last Dose Taken:  , 1 tab(s) orally once a day      Drug Dosing Weight  Height (cm): 175.3 (27 Jul 2017 15:47)  Weight (kg): 91.626 (27 Jul 2017 15:47)  BMI (kg/m2): 29.8 (27 Jul 2017 15:47)  BSA (m2): 2.07 (27 Jul 2017 15:47)    FAMILY HISTORY:  No pertinent family history in first degree relatives      SOCIAL HISTORY:  Smoker[ ]  Non smoker[x ]  Alcohol- denies      ADVANCE DIRECTIVES: full code    REVIEW OF SYSTEMS:    CONSTITUTIONAL: No fevers, No chills, No fatigue, No weight gain  EYES: No vision changes   ENT: No congestion, No ear pain, No sore throat.  NECK: No pain, No stiffness  RESPIRATORY: No shortness of breath, No cough, No wheezing, No hemoptysis  CARDIOVASCULAR: No chest pain. No palpitations, No GOLDSMITH, No orthopnea, No paroxysmal nocturnal dyspnea, No pleuritic pain  GASTROINTESTINAL: No abdominal pain, No nausea, No vomiting, No hematemesis, No diarrhea No constipation. No melena  GENITOURINARY: No dysuria, No frequency, No incontinence, No hematuria  NEUROLOGICAL: No dizziness, No lightheadedness, No syncope, No LOC, No headache, No numbness or weakness  MUSCULOSKELETAL: No joint pain, No joint swelling.  PSYCHIATRIC: No anxiety, No depression  SKIN: No diaphoresis. No itching, No rashes, No pressure ulcers    All other review of systems is negative unless indicated above.    PHYSICAL EXAM:    Appearance: NAD, no distress  HEENT:   Normal oral mucosa, PERRL, EOMI  Cardiovascular: Regular rate and rhythm, Normal S1 S2, No JVD, No murmurs, No edema  Respiratory: Lungs clear to auscultation. No rales, No rhonchi, No wheezing. No tenderness to palpation  Gastrointestinal:  Soft, Non-tender, + BS  Neurologic: Non-focal, A&Ox3  Skin: Warm and dry, No rashes, No ecchymoses, No cyanosis  Extremities: No clubbing, cyanosis or edema  Vascular: Peripheral pulses palpable 2+ bilaterally  Psychiatry: Mood & affect appropriate      ICU Vital Signs Last 24 Hrs  T(C): --  T(F): --  HR: --  BP: --  BP(mean): --  ABP: --  ABP(mean): --  RR: --  SpO2: --        LABS:  CBC Full  -  ( 27 Jul 2017 19:00 )  WBC Count : 7.38 K/uL  Hemoglobin : 9.2 g/dL  Hematocrit : 31.8 %  Platelet Count - Automated : 246 K/uL  Mean Cell Volume : 79.5 fL  Mean Cell Hemoglobin : 23.0 pg  Mean Cell Hemoglobin Concentration : 28.9 %  Auto Neutrophil # : x  Auto Lymphocyte # : x  Auto Monocyte # : x  Auto Eosinophil # : x  Auto Basophil # : x  Auto Neutrophil % : x  Auto Lymphocyte % : x  Auto Monocyte % : x  Auto Eosinophil % : x  Auto Basophil % : x        EKG:    ECHO:pending    CARDIAC CATH:  < from: Cardiac Cath Lab - Adult (07.27.17 @ 18:19) >    VENTRICLES: No LV gram was performed; however, a recent echocardiogram  demonstrated an EF of 35 %.  CORONARY VESSELS: The coronary circulation is left dominant.  LM:   --  LM: Angiography showed mild atherosclerosis with no flow limiting  lesions.  LAD:   --  Proximal LAD: There was a 95 % stenosis.  --  Mid LAD: There was a 99 % stenosis.  --  D1: Angiography showed severe atherosclerosis.  CX:   --  Circumflex: Angiography showed mild atherosclerosis with no flow  limiting lesions.  --  OM1: The vessel was large sized. There was a 99 % stenosis.  RCA:   --  Proximal RCA: There was a 100 % stenosis.  COMPLICATIONS: There were no complications.  DIAGNOSTIC RECOMMENDATIONS: Severe multi-vessel CAD in DM patient.  s/p V. Fib Arrest (Defib x 1)  Systolic CHF EF 35%  CTS for CABG.  INTERVENTIONAL RECOMMENDATIONS: Severe multi-vessel CAD in DM patient.  s/p V. Fib Arrest (Defib x 1)  Systolic CHF EF 35%  CTS for CABG.    < end of copied text >      RADIOLOGY STUDIES    CXR: pending      ASSESSMENT  68 y/o M w/ PMH of DM, HTN, severe multi vessel CAD, HFrEF (EF: 35%) a/w CAD s/p LHC.    PLAN    Has h/o CAD- s/p LHC showing severe multi vessel CAD; pLAd 95% stenosis, mLAD 99% stenosis, OM1 99% stenosis, % stenosis. EF 35%  -transferred to CCU post cath for close monitoring  -C/W aspirin, plavix and lipitor  -Check lipid panel  -trend CE, serial EKG  -low fat, DASH diet  -Risk factor modification; hgb1ac, TSH, proBNP  - CTS consult for CABG, transfer to [facility] when possible    Patient with h/o hypertension and is on metoprolol  Continue home medications  DASH diet  Monitor blood pressure    Has h/o diabetes and is on oral hypoglycemic agents and insulin  Check HbA1c  Hold oral hypoglycemic agents  Monitor blood sugars ac and hs  Lispro insulin sliding scale  Lantus   Diabetes education  Endocrine consult if needed  Nutrition consult if needed        Case discussed with Cardiology fellow Dr. Schofield Reason for CCU Consult:  s/p [facility], needs CCU for close monitoring    HISTORY OF PRESENT ILLNESS:  66 y/o M w/ PMH of DM on insulin, HTN, CAD, Anemia, MI (?2007) and CHF being transferred from [facility] for cardiac catheretization. Pt was admitted to [place] for an acute CHF exacerbation and was diuresed with IV Lasix BID. During hospitalization in [facility], he had a stress test on 7/24/17 which showed a severe fixed defect involving the mid to apical gayatri-septal wall and EF of 34%. His admission was complicated by Vfib cardiac arrest and ROSC was achieved after 12 minutes after 3 doses of epinephrine and defibrillation. Patient was then transferred to CCU at that time after ROSC and intubation. Pt was extubated in CCU and echo showed EF 35-40%, severe hypokinesis of anterior/septal/apical walls with severe diastolic dysfunction. Patient was transfused 1 unit PRBC for anemia and was started on iron supplementation. Pt transferred to [facility] cath lab for angiogram 7/27/17. Patient now transferred to [facility] CCU for close monitoring.      Allergies    No Known Allergies    Intolerances        PAST MEDICAL & SURGICAL HISTORY:  Anemia  DM (diabetes mellitus)  MI (myocardial infarction)  CAD (coronary artery disease)  CHF (congestive heart failure)  HLD (hyperlipidemia)  HTN (hypertension)  No significant past surgical history      MEDICATIONS :  · 	aspirin 81 mg oral tablet: Last Dose Taken:  , 1 tab(s) orally once a day  · 	Lipitor 80 mg oral tablet: Last Dose Taken:  , 1 tab(s) orally once a day  · 	Plavix 75 mg oral tablet: Last Dose Taken:  , 1 tab(s) orally once a day  · 	Lasix 40 mg oral tablet: Last Dose Taken:  , 1 tab(s) orally once a day  · 	Lantus 100 units/mL subcutaneous solution: Last Dose Taken:  , 17 unit(s) subcutaneous once a day (at bedtime)  · 	ferrous sulfate 325 mg (65 mg elemental iron) oral delayed release tablet: Last Dose Taken:  , 1 tab(s) orally 2 times a day  · 	metoprolol tartrate 25 mg oral tablet: Last Dose Taken:  , 1 tab(s) orally 2 times a day  · 	valsartan 40 mg oral tablet: Last Dose Taken:  , 1 tab(s) orally once a day      Drug Dosing Weight  Height (cm): 175.3 (27 Jul 2017 15:47)  Weight (kg): 91.626 (27 Jul 2017 15:47)  BMI (kg/m2): 29.8 (27 Jul 2017 15:47)  BSA (m2): 2.07 (27 Jul 2017 15:47)    FAMILY HISTORY:  No pertinent family history in first degree relatives      SOCIAL HISTORY:  Smoker[ ]  Non smoker[x ]  Alcohol- denies      ADVANCE DIRECTIVES: full code    REVIEW OF SYSTEMS:    CONSTITUTIONAL: No fevers, No chills, No fatigue, No weight gain  EYES: No vision changes   ENT: No congestion, No ear pain, No sore throat.  NECK: No pain, No stiffness  RESPIRATORY: No shortness of breath, No cough, No wheezing, No hemoptysis  CARDIOVASCULAR: No chest pain. No palpitations, No GOLDSMITH, No orthopnea, No paroxysmal nocturnal dyspnea, No pleuritic pain  GASTROINTESTINAL: No abdominal pain, No nausea, No vomiting, No hematemesis, No diarrhea No constipation. No melena  GENITOURINARY: No dysuria, No frequency, No incontinence, No hematuria  NEUROLOGICAL: No dizziness, No lightheadedness, No syncope, No LOC, No headache, No numbness or weakness  MUSCULOSKELETAL: No joint pain, No joint swelling.  PSYCHIATRIC: No anxiety, No depression  SKIN: No diaphoresis. No itching, No rashes, No pressure ulcers    All other review of systems is negative unless indicated above.    PHYSICAL EXAM:    Appearance: NAD, no distress  HEENT:   Normal oral mucosa, PERRL, EOMI  Cardiovascular: Regular rate and rhythm, Normal S1 S2, No JVD, No murmurs, No edema  Respiratory: Lungs clear to auscultation. No rales, No rhonchi, No wheezing. No tenderness to palpation  Gastrointestinal:  Soft, Non-tender, + BS  Neurologic: Non-focal, A&Ox3  Skin: Warm and dry, No rashes, No ecchymoses, No cyanosis  Extremities: No clubbing, cyanosis or edema  Vascular: Peripheral pulses palpable 2+ bilaterally  Psychiatry: Mood & affect appropriate      ICU Vital Signs Last 24 Hrs  T(C): --  T(F): --97.9  HR: --91  BP: --125-81  BP(mean): --90  ABP: --  ABP(mean): --  RR: --20  SpO2: --99        LABS:  CBC Full  -  ( 27 Jul 2017 19:00 )  WBC Count : 7.38 K/uL  Hemoglobin : 9.2 g/dL  Hematocrit : 31.8 %  Platelet Count - Automated : 246 K/uL  Mean Cell Volume : 79.5 fL  Mean Cell Hemoglobin : 23.0 pg  Mean Cell Hemoglobin Concentration : 28.9 %  Auto Neutrophil # : x  Auto Lymphocyte # : x  Auto Monocyte # : x  Auto Eosinophil # : x  Auto Basophil # : x  Auto Neutrophil % : x  Auto Lymphocyte % : x  Auto Monocyte % : x  Auto Eosinophil % : x  Auto Basophil % : x        EKG:    ECHO:pending    CARDIAC CATH:  < from: Cardiac Cath Lab - Adult (07.27.17 @ 18:19) >    VENTRICLES: No LV gram was performed; however, a recent echocardiogram  demonstrated an EF of 35 %.  CORONARY VESSELS: The coronary circulation is left dominant.  LM:   --  LM: Angiography showed mild atherosclerosis with no flow limiting  lesions.  LAD:   --  Proximal LAD: There was a 95 % stenosis.  --  Mid LAD: There was a 99 % stenosis.  --  D1: Angiography showed severe atherosclerosis.  CX:   --  Circumflex: Angiography showed mild atherosclerosis with no flow  limiting lesions.  --  OM1: The vessel was large sized. There was a 99 % stenosis.  RCA:   --  Proximal RCA: There was a 100 % stenosis.  COMPLICATIONS: There were no complications.  DIAGNOSTIC RECOMMENDATIONS: Severe multi-vessel CAD in DM patient.  s/p V. Fib Arrest (Defib x 1)  Systolic CHF EF 35%  CTS for CABG.  INTERVENTIONAL RECOMMENDATIONS: Severe multi-vessel CAD in DM patient.  s/p V. Fib Arrest (Defib x 1)  Systolic CHF EF 35%  CTS for CABG.    < end of copied text >      RADIOLOGY STUDIES    CXR: pending      ASSESSMENT  66 y/o M w/ PMH of DM, HTN, severe multi vessel CAD, HFrEF (EF: 35%) a/w CAD s/p LHC.    PLAN    1.Has h/o CAD- s/p LHC showing severe multi vessel CAD; pLAd 95% stenosis, mLAD 99% stenosis, OM1 99% stenosis, % stenosis. EF 35%  -transferred to CCU post cath for close monitoring  -C/W aspirin, plavix and lipitor  -Check lipid panel  -trend CE, serial EKG  -low fat, DASH diet  -Risk factor modification; hgb1ac, TSH, proBNP  - CTS consult for CABG, transfer to [facility] when possible    2. Patient with h/o hypertension and is on metoprolol  Continue home medications  DASH diet  Monitor blood pressure    3. DM on insulin  Check HbA1c  Hold oral hypoglycemic agents  Monitor blood sugars ac and hs  Lispro insulin sliding scale  Lantus   Diabetes education  Endocrine consult if needed  Nutrition consult if needed        Case discussed with Cardiology fellow Dr. Schofield

## 2021-05-05 NOTE — H&P CARDIOLOGY - LIVES WITH, PROFILE
children You can access the FollowMyHealth Patient Portal offered by [facility] by registering at the following website: [email or web address]. By joining [facility]’s FollowMyHealth portal, you will also be able to view your health information using other applications (apps) compatible with our system.

## 2021-08-05 NOTE — PROGRESS NOTE ADULT - SUBJECTIVE AND OBJECTIVE BOX
What Is The Reason For Today's Visit?: Full Body Skin Examination Chief complaint  Patient is a 67y old  Male who presents with a chief complaint of I need heart surgery (28 Jul 2017 00:49)   Review of systems  Patient in bed, comfortable, no fever, sx today, no hypoglycemia.    Labs and Fingersticks    CAPILLARY BLOOD GLUCOSE  229 (31 Jul 2017 11:41)  187 (31 Jul 2017 07:33)  229 (31 Jul 2017 01:19)  135 (30 Jul 2017 21:43)  190 (30 Jul 2017 16:25)  132 (30 Jul 2017 13:00)  209 (30 Jul 2017 11:06)  172 (30 Jul 2017 09:34)  144 (30 Jul 2017 07:38)  144 (30 Jul 2017 07:15)  134 (30 Jul 2017 05:52)  119 (30 Jul 2017 04:22)  105 (30 Jul 2017 02:32)  95 (30 Jul 2017 01:31)  86 (30 Jul 2017 00:26)  83 (29 Jul 2017 23:46)  82 (29 Jul 2017 23:33)  71 (29 Jul 2017 23:15)  75 (29 Jul 2017 22:52)  71 (29 Jul 2017 22:35)  56 (29 Jul 2017 22:20)  67 (29 Jul 2017 22:00)  134 (29 Jul 2017 21:04)  117 (29 Jul 2017 20:00)  272 (29 Jul 2017 18:59)  342 (29 Jul 2017 16:21)  243 (29 Jul 2017 11:07)  148 (29 Jul 2017 06:02)  128 (29 Jul 2017 04:14)  128 (29 Jul 2017 02:10)  126 (29 Jul 2017 00:24)  155 (28 Jul 2017 23:32)  105 (28 Jul 2017 22:22)  80 (28 Jul 2017 21:48)  77 (28 Jul 2017 21:15)  88 (28 Jul 2017 20:47)  148 (28 Jul 2017 19:47)  188 (28 Jul 2017 18:47)  186 (28 Jul 2017 17:45)  242 (28 Jul 2017 16:49)  306 (28 Jul 2017 15:37)    Anion Gap, Serum: 12 (07-30 @ 06:37)      Calcium, Total Serum: 7.9 <L> (07-30 @ 06:37)          07-30    135  |  99  |  15  ----------------------------<  137<H>  3.5   |  24  |  0.98    Ca    7.9<L>      30 Jul 2017 06:37  Mg     1.6     07-30                          10.3   8.0   )-----------( 298      ( 30 Jul 2017 06:37 )             34.9     Medications  MEDICATIONS  (STANDING):  aspirin enteric coated 81 milliGRAM(s) Oral daily  atorvastatin 80 milliGRAM(s) Oral at bedtime  ferrous    sulfate 325 milliGRAM(s) Oral daily  furosemide    Tablet 40 milliGRAM(s) Oral daily  potassium chloride    Tablet ER 20 milliEquivalent(s) Oral daily  pantoprazole    Tablet 40 milliGRAM(s) Oral before breakfast  insulin lispro (HumaLOG) corrective regimen sliding scale   SubCutaneous three times a day before meals  insulin lispro (HumaLOG) corrective regimen sliding scale   SubCutaneous at bedtime  heparin  Infusion 1000 Unit(s)/Hr (12 mL/Hr) IV Continuous <Continuous>  metoprolol 25 milliGRAM(s) Oral three times a day  dextrose 50% Injectable 25 Gram(s) IV Push once  insulin Infusion 6 Unit(s)/Hr (6 mL/Hr) IV Continuous <Continuous>  mupirocin 2% Ointment 1 Application(s) Topical two times a day  insulin lispro Injectable (HumaLOG) 9 Unit(s) SubCutaneous three times a day before meals  cefuroxime  IVPB 1500 milliGRAM(s) IV Intermittent once      Physical Exam  General: Patient comfortable in bed  Vital Signs Last 12 Hrs  T(F): 98.2 (07-31-17 @ 11:41), Max: 98.5 (07-31-17 @ 05:25)  HR: 78 (07-31-17 @ 11:41) (77 - 107)  BP: 111/62 (07-31-17 @ 11:41) (106/63 - 118/64)  BP(mean): --  RR: 18 (07-31-17 @ 11:41) (18 - 18)  SpO2: 100% (07-31-17 @ 11:41) (98% - 100%)  Neck: No palpable thyroid nodules.  CVS: S1S2, No murmurs  Respiratory: No wheezing, no crepitations  GI: Abdomen soft, bowel sounds positive  Musculoskeletal: Positive edema lower extremities bilaterally  Skin: No skin rashes, no ecchimosis    Diagnostics    Thyroid Stimulating Hormone, Serum: AM Sched. Collection: 29-Jul-2017 06:00 (07-28 @ 11:45)  Free Thyroxine, Serum: AM Sched. Collection: 29-Jul-2017 06:00 (07-28 @ 11:45) What Is The Reason For Today's Visit? (Being Monitored For X): concerning skin lesions on an annual basis How Severe Are Your Spot(S)?: mild

## 2022-05-24 NOTE — PROGRESS NOTE ADULT - PROBLEM/PLAN-1
Motrin 800 mg PO given to pt per request.  Educated pt to call out if pain medication does not help. DISPLAY PLAN FREE TEXT

## 2023-03-09 NOTE — PROGRESS NOTE ADULT - PROVIDER SPECIALTY LIST ADULT
CT Surgery REQUIRED DOCUMENTATION:     1  This service was provided via Telemedicine  2  Provider located at [address] [place] [address]  3  TeleMed provider: Leonor Braswell MD   4  Identify all parties in room with patient during tele consult:  None  5  After connecting through [employer] patient was identified by name and date of birth and assistant checked wristband  Patient was then informed that this was a Telemedicine visit and that the exam was being conducted confidentially over secure lines  My office door was closed  No one else was in the room  Patient acknowledged consent and understanding of privacy and security of the Telemedicine visit, and gave us permission to have the assistant stay in the room in order to assist with the history and to conduct the exam   I informed the patient that I have reviewed their record in Epic and presented the opportunity for them to ask any questions regarding the visit today  The patient agreed to participate  Progress Note - Infectious Disease   Rubin Patterson  64 y o  male MRN: [id number]  Unit/Bed#: -01 Encounter: [id number]      Impression/Plan:  1   Acute monoarticular arthritis of the right knee  Appears to be secondary to gout  With an elevated left shifted cell count but not consistent with an acute septic joint  Synovial crystals positive for uric acid crystals  Synovial cultures negative thus far   Less likely but possible would be an acute septic arthritis that is partially treated    Lyme serology negative  -No directed antibiotics  -Follow-up synovial cultures  -Orthopedics follow-up  -Serial exams  -Treatment of gout as per the primary service now starting steroids     2   Left diabetic heel ulcer   With pressure component   Patient has had this for quite some time and by his report the heel ulcer is improving   No clear clinical or radiographic evidence of a deeper infection   No definitive cellulitis on exam   Regardless, the patient is already received more than 5 days of antibiotics   Certainly at risk of breaking down and leading to limb loss and becoming secondarily infected  -No additional antibiotics for now  -Local wound care  -Close podiatry follow-up     3   Chronic kidney disease   Stage IV   Renal function relatively stable but a bit worse today  -Volume management  -Recheck BMP     4   Acute encephalopathy   Possibly metabolic issues with the patient having worsening renal function   Possibly medication effect   CT of the brain with no acute abnormality   No clear infectious etiology   Patient's cognition has apparently improved  -Monitor cognition  -Treatment of metabolic issues  -No additional ID work-up for now    I spent 50 minutes in evaluation of the patient of which 30 minutes was in counseling/coordination of care    Discussed the above antibiotic management plan with the primary service and they agree with the plan    Antibiotics:  Off antibiotics 3    Subjective:  Patient has no fever, chills, sweats; no nausea, vomiting, diarrhea; no cough, shortness of breath; increased knee pain  No new symptoms  Objective:  Vitals:  Temp:  [98 1 °F (36 7 °C)-98 2 °F (36 8 °C)] 98 1 °F (36 7 °C)  HR:  [62-73] 67  Resp:  [13-18] 18  BP: (124-136)/(67-68) 136/68  SpO2:  [95 %-98 %] 96 %  Temp (24hrs), Av 1 °F (36 7 °C), Min:98 1 °F (36 7 °C), Max:98 2 °F (36 8 °C)  Current: Temperature: 98 1 °F (36 7 °C)    Documented physical exam has been primarily done by the patient's nurse and/or the primary service due to limited examination abilities on telemedicine    Physical Exam:   General Appearance:  Alert, interactive, nontoxic, no acute distress  Throat: Oropharynx moist without lesions  Lungs:   Clear to auscultation bilaterally; no wheezes, rhonchi or rales; respirations unlabored   Heart:  RRR; no murmur, rub or gallop   Abdomen:   Soft, non-tender, non-distended, positive bowel sounds  Extremities: No clubbing, cyanosis    Left knee swelling   Skin: No new rashes or lesions  No draining wounds noted  Labs, Imaging, & Other studies:   All pertinent labs and imaging studies were personally reviewed  Results from last 7 days   Lab Units 03/09/23  0441 03/08/23  0539 03/07/23  0522   WBC Thousand/uL 8 19 7 26 6 67   HEMOGLOBIN g/dL 8 0* 8 5* 8 5*   PLATELETS Thousands/uL 204 174 155     Results from last 7 days   Lab Units 03/09/23  0441 03/08/23  0539 03/07/23  0522 03/05/23  0550 03/04/23  0701   SODIUM mmol/L 139 138 134*   < > 136   POTASSIUM mmol/L 4 3 4 2 4 0   < > 4 0   CHLORIDE mmol/L 104 101 100   < > 103   CO2 mmol/L 27 29 32   < > 26   BUN mg/dL 73* 65* 53*   < > 47*   CREATININE mg/dL 2 86* 3 03* 2 86*   < > 3 16*   EGFR ml/min/1 73sq m 23 21 23   < > 20   CALCIUM mg/dL 10 8* 10 8* 10 8*   < > 10 4*   AST U/L  --   --   --   --  7*   ALT U/L  --   --   --   --  3*   ALK PHOS U/L  --   --   --   --  73    < > = values in this interval not displayed       Results from last 7 days   Lab Units 03/07/23  1226 03/02/23  2227   GRAM STAIN RESULT  No Polys or Bacteria seen  --    BODY FLUID CULTURE, STERILE  No growth  --    MRSA CULTURE ONLY   --  No Methicillin Resistant Staphlyococcus aureus (MRSA) isolated     Results from last 7 days   Lab Units 03/06/23  0432 03/04/23  0701   PROCALCITONIN ng/ml 0 79* 1 15*     Results from last 7 days   Lab Units 03/06/23  0432   CRP mg/L 141 8*

## 2024-01-10 NOTE — H&P ADULT - PMH
general appearance normal
Anemia    CAD (coronary artery disease)    CHF (congestive heart failure)    DM (diabetes mellitus)    HLD (hyperlipidemia)    HTN (hypertension)    MI (myocardial infarction)

## 2024-08-27 NOTE — PATIENT PROFILE ADULT. - PATIENT REPRESENTATIVE NAME
Medication list obtained from patient and confirmed. Pt  is accompanied by son/patient/chart(s)
monica
